# Patient Record
Sex: FEMALE | Race: WHITE | NOT HISPANIC OR LATINO | Employment: FULL TIME | URBAN - METROPOLITAN AREA
[De-identification: names, ages, dates, MRNs, and addresses within clinical notes are randomized per-mention and may not be internally consistent; named-entity substitution may affect disease eponyms.]

---

## 2017-11-01 ENCOUNTER — TRANSFERRED RECORDS (OUTPATIENT)
Dept: MULTI SPECIALTY CLINIC | Facility: CLINIC | Age: 32
End: 2017-11-01

## 2017-11-01 LAB — PAP SMEAR - HIM PATIENT REPORTED: NEGATIVE

## 2018-07-11 ENCOUNTER — TELEPHONE (OUTPATIENT)
Dept: OTHER | Facility: CLINIC | Age: 33
End: 2018-07-11

## 2018-07-11 NOTE — TELEPHONE ENCOUNTER
07/11/18      Call Regarding Onboarding [facility]    Attempt 1    Message no voice mail    Comments:       Outreach   Anne Marie James

## 2018-10-18 ENCOUNTER — ALLIED HEALTH/NURSE VISIT (OUTPATIENT)
Dept: NURSING | Facility: CLINIC | Age: 33
End: 2018-10-18
Payer: COMMERCIAL

## 2018-10-18 DIAGNOSIS — Z23 NEED FOR PROPHYLACTIC VACCINATION AND INOCULATION AGAINST INFLUENZA: Primary | ICD-10-CM

## 2018-10-18 PROCEDURE — 99207 ZZC NO CHARGE NURSE ONLY: CPT

## 2018-10-18 PROCEDURE — 90471 IMMUNIZATION ADMIN: CPT

## 2018-10-18 PROCEDURE — 90686 IIV4 VACC NO PRSV 0.5 ML IM: CPT

## 2018-10-18 NOTE — MR AVS SNAPSHOT
"              After Visit Summary   10/18/2018    Arely Carranza    MRN: [id number]           Patient Information     Date Of Birth          1985        Visit Information        Provider Department      10/18/2018 8:20 AM CARE COORDINATOR [facility]        Today's Diagnoses     Need for prophylactic vaccination and inoculation against influenza    -  1       Follow-ups after your visit        Who to contact     If you have questions or need follow up information about today's clinic visit or your schedule please contact [facility] directly at [phone].  Normal or non-critical lab and imaging results will be communicated to you by [email or web address], letter or phone within 4 business days after the clinic has received the results. If you do not hear from us within 7 days, please contact the clinic through [email or web address] or phone. If you have a critical or abnormal lab result, we will notify you by phone as soon as possible.  Submit refill requests through [employer] or call your pharmacy and they will forward the refill request to us. Please allow 3 business days for your refill to be completed.          Additional Information About Your Visit        MyChart Information     [employer] lets you send messages to your doctor, view your test results, renew your prescriptions, schedule appointments and more. To sign up, go to [email or web address] . Click on \"Log in\" on the left side of the screen, which will take you to the Welcome page. Then click on \"Sign up Now\" on the right side of the page.     You will be asked to enter the access code listed below, as well as some personal information. Please follow the directions to create your username and password.     Your access code is: [email or web address]  Expires: 2019  8:49 AM     Your access code will  in 90 days. If you need help or a new code, please call your [facility] or [phone].        Care " EveryWhere ID     This is your Care EveryWhere ID. This could be used by other organizations to access your [facility] medical records  [id number]         Blood Pressure from Last 3 Encounters:   No data found for BP    Weight from Last 3 Encounters:   No data found for Wt              We Performed the Following     FLU VACCINE, SPLIT VIRUS, IM (QUADRIVALENT) [99664]- >3 YRS     Vaccine Administration, Initial [58074]        Primary Care Provider    None Specified       No primary provider on file.        Equal Access to Services     [facility] : Hadii aad ku hadasho Soomaali, waaxda luqadaha, qaybta kaalmada adeegyada, ly leon . So [facility] [phone].    ATENCIÓN: Si habla español, tiene a hauser disposición servicios gratuitos de asistencia lingüística. Llame al [phone].    We comply with applicable federal civil rights laws and [place] laws. We do not discriminate on the basis of race, color, national origin, age, disability, sex, sexual orientation, or gender identity.            Thank you!     Thank you for choosing [facility]  for your care. Our goal is always to provide you with excellent care. Hearing back from our patients is one way we can continue to improve our services. Please take a few minutes to complete the written survey that you may receive in the mail after your visit with us. Thank you!             Your Updated Medication List - Protect others around you: Learn how to safely use, store and throw away your medicines at www.disposemymeds.org.      Notice  As of 10/18/2018  8:49 AM    You have not been prescribed any medications.

## 2018-10-18 NOTE — PROGRESS NOTES

## 2018-10-18 NOTE — LETTER
October 18, 2018        RE: Arely Carranza        Immunization History   Administered Date(s) Administered     Influenza Vaccine IM 3yrs+ 4 Valent IIV4 10/18/2018

## 2019-09-23 NOTE — PROGRESS NOTES
Albuterol     SUBJECTIVE:   CC: Arely Carranza is an 34 year old woman who presents for preventive health visit.     Healthy Habits:    Do you get at least three servings of calcium containing foods daily (dairy, green leafy vegetables, etc.)? yes    Amount of exercise or daily activities, outside of work: 2 day(s) per week    Problems taking medications regularly No    Medication side effects: No    Have you had an eye exam in the past two years? no    Do you see a dentist twice per year? no    Do you have sleep apnea, excessive snoring or daytime drowsiness?no    Please abstract the following data from this visit with this patient into the appropriate field in Epic:    Tests that can be patient reported without a hard copy:    Pap smear done on this date: 11/2017 (approximately), by this group: Had it done in [facility], results were normal. .     Asthma Follow-Up    Was ACT completed today?  Yes  No flowsheet data found. stable and well-controlled    How many days per week do you miss taking your asthma controller medication?  I do not have an asthma controller medication    Please describe any recent triggers for your asthma: upper respiratory infections and cold air    Have you had any Emergency Room Visits, Urgent Care Visits, or Hospital Admissions since your last office visit?  No          Other Tests found in the patient's chart through Chart Review/Care Everywhere:    Note to Abstraction: If this section is blank, no results were found via Chart Review/Care Everywhere.     -------------------------------------    Today's PHQ-2 Score:   PHQ-2 ( 1999 Pfizer) 9/24/2019   Q1: Little interest or pleasure in doing things 0   Q2: Feeling down, depressed or hopeless 0   PHQ-2 Score 0       Abuse: Current or Past(Physical, Sexual or Emotional)- No  Do you feel safe in your environment? Yes    Social History     Tobacco Use     Smoking status: Never Smoker     Smokeless tobacco: Never Used  "  Substance Use Topics     Alcohol use: Yes     If you drink alcohol do you typically have >3 drinks per day or >7 drinks per week? No                     Reviewed orders with patient.  Reviewed health maintenance and updated orders accordingly - Yes  Lab work is in process    Mammogram not appropriate for this patient based on age.    Pertinent mammograms are reviewed under the imaging tab.  History of abnormal Pap smear: NO - age 30-65 PAP every 5 years with negative HPV co-testing recommended     Reviewed and updated as needed this visit by clinical staff  Tobacco  Allergies  Meds  Med Hx  Surg Hx  Fam Hx  Soc Hx        Reviewed and updated as needed this visit by Provider            ROS:  CONSTITUTIONAL:POSITIVE  for weight gain  INTEGUMENTARY/SKIN: adult acne  EYES: NEGATIVE for vision changes or irritation  ENT: NEGATIVE for ear, mouth and throat problems  RESP: NEGATIVE for significant cough or SOB  BREAST: NEGATIVE for masses, tenderness or discharge  CV: NEGATIVE for chest pain, palpitations or peripheral edema  GI: NEGATIVE for nausea, abdominal pain, heartburn, or change in bowel habits   female: slightly irregular periods, breakthrough spotting- no birth control currently, not sexually active  MUSCULOSKELETAL: NEGATIVE for significant arthralgias or myalgia  NEURO: NEGATIVE for weakness, dizziness or paresthesias  PSYCHIATRIC: NEGATIVE for changes in mood or affect    OBJECTIVE:   /68   Pulse 66   Temp 98.1  F (36.7  C) (Oral)   Ht 1.685 m (5' 6.34\")   Wt 56.7 kg (125 lb 1.6 oz)   SpO2 99%   BMI 19.99 kg/m    EXAM:  GENERAL: healthy, alert and no distress  EYES: Eyes grossly normal to inspection, PERRL and conjunctivae and sclerae normal  HENT: ear canals and TM's normal, nose and mouth without ulcers or lesions  NECK: no adenopathy, no asymmetry, masses, or scars and thyroid normal to palpation  RESP: lungs clear to auscultation - no rales, rhonchi or wheezes  BREAST: normal without " "masses, tenderness or nipple discharge and no palpable axillary masses or adenopathy  CV: regular rate and rhythm, normal S1 S2, no S3 or S4, no murmur, click or rub, no peripheral edema and peripheral pulses strong  ABDOMEN: soft, nontender, no hepatosplenomegaly, no masses and bowel sounds normal   (female): normal female external genitalia, normal urethral meatus, vaginal mucosa, normal cervix/adnexa/uterus without masses or discharge  MS: no gross musculoskeletal defects noted, no edema  SKIN: no suspicious lesions or rashes; mild acne  NEURO: Normal strength and tone, mentation intact and speech normal  PSYCH: mentation appears normal, affect normal/bright    Diagnostic Test Results:  Labs reviewed in Epic  No results found for this or any previous visit (from the past 24 hour(s)).    ASSESSMENT/PLAN:       ICD-10-CM    1. Weight gain R63.5 TSH with free T4 reflex   2. Routine general medical examination at a health care facility Z00.00    3. Screening for cervical cancer Z12.4 Pap imaged thin layer screen with HPV - recommended age 30 - 65 years (select HPV order below)   4. Irregular periods N92.6 TSH with free T4 reflex     CBC with platelets   5. Mild intermittent asthma without complication J45.20 albuterol (PROAIR HFA/PROVENTIL HFA/VENTOLIN HFA) 108 (90 Base) MCG/ACT inhaler     DISCONTINUED: albuterol (PROAIR HFA/PROVENTIL HFA/VENTOLIN HFA) 108 (90 Base) MCG/ACT inhaler       COUNSELING:   Reviewed preventive health counseling, as reflected in patient instructions       Regular exercise       Healthy diet/nutrition    Estimated body mass index is 19.99 kg/m  as calculated from the following:    Height as of this encounter: 1.685 m (5' 6.34\").    Weight as of this encounter: 56.7 kg (125 lb 1.6 oz).         reports that she has never smoked. She has never used smokeless tobacco.      Counseling Resources:  ATP IV Guidelines  Pooled Cohorts Equation Calculator  Breast Cancer Risk Calculator  FRAX Risk " Assessment  ICSI Preventive Guidelines  Dietary Guidelines for Americans, 2010  USDA's MyPlate  ASA Prophylaxis  Lung CA Screening    HENRY Becerril CNP  [facility]

## 2019-09-24 ENCOUNTER — OFFICE VISIT (OUTPATIENT)
Dept: FAMILY MEDICINE | Facility: CLINIC | Age: 34
End: 2019-09-24
Payer: COMMERCIAL

## 2019-09-24 VITALS
SYSTOLIC BLOOD PRESSURE: 116 MMHG | DIASTOLIC BLOOD PRESSURE: 68 MMHG | BODY MASS INDEX: 20.1 KG/M2 | WEIGHT: 125.1 LBS | HEART RATE: 66 BPM | HEIGHT: 66 IN | TEMPERATURE: 98.1 F | OXYGEN SATURATION: 99 %

## 2019-09-24 DIAGNOSIS — Z12.4 SCREENING FOR CERVICAL CANCER: ICD-10-CM

## 2019-09-24 DIAGNOSIS — Z00.00 ROUTINE GENERAL MEDICAL EXAMINATION AT A HEALTH CARE FACILITY: ICD-10-CM

## 2019-09-24 DIAGNOSIS — N92.6 IRREGULAR PERIODS: ICD-10-CM

## 2019-09-24 DIAGNOSIS — J45.20 MILD INTERMITTENT ASTHMA WITHOUT COMPLICATION: ICD-10-CM

## 2019-09-24 DIAGNOSIS — J45.20 MILD INTERMITTENT ASTHMA, UNSPECIFIED WHETHER COMPLICATED: ICD-10-CM

## 2019-09-24 DIAGNOSIS — R63.5 WEIGHT GAIN: Primary | ICD-10-CM

## 2019-09-24 LAB
ERYTHROCYTE [DISTWIDTH] IN BLOOD BY AUTOMATED COUNT: 13.1 % (ref 10–15)
HCT VFR BLD AUTO: 40.7 % (ref 35–47)
HGB BLD-MCNC: 13.6 G/DL (ref 11.7–15.7)
MCH RBC QN AUTO: 29.8 PG (ref 26.5–33)
MCHC RBC AUTO-ENTMCNC: 33.4 G/DL (ref 31.5–36.5)
MCV RBC AUTO: 89 FL (ref 78–100)
PLATELET # BLD AUTO: 163 10E9/L (ref 150–450)
RBC # BLD AUTO: 4.56 10E12/L (ref 3.8–5.2)
TSH SERPL DL<=0.005 MIU/L-ACNC: 2.21 MU/L (ref 0.4–4)
WBC # BLD AUTO: 5.8 10E9/L (ref 4–11)

## 2019-09-24 PROCEDURE — G0145 SCR C/V CYTO,THINLAYER,RESCR: HCPCS | Performed by: NURSE PRACTITIONER

## 2019-09-24 PROCEDURE — 87624 HPV HI-RISK TYP POOLED RSLT: CPT | Performed by: NURSE PRACTITIONER

## 2019-09-24 PROCEDURE — 90686 IIV4 VACC NO PRSV 0.5 ML IM: CPT | Performed by: NURSE PRACTITIONER

## 2019-09-24 PROCEDURE — 84443 ASSAY THYROID STIM HORMONE: CPT | Performed by: NURSE PRACTITIONER

## 2019-09-24 PROCEDURE — 36415 COLL VENOUS BLD VENIPUNCTURE: CPT | Performed by: NURSE PRACTITIONER

## 2019-09-24 PROCEDURE — 90471 IMMUNIZATION ADMIN: CPT | Performed by: NURSE PRACTITIONER

## 2019-09-24 PROCEDURE — 99385 PREV VISIT NEW AGE 18-39: CPT | Mod: 25 | Performed by: NURSE PRACTITIONER

## 2019-09-24 PROCEDURE — 85027 COMPLETE CBC AUTOMATED: CPT | Performed by: NURSE PRACTITIONER

## 2019-09-24 RX ORDER — ALBUTEROL SULFATE 90 UG/1
2 AEROSOL, METERED RESPIRATORY (INHALATION) EVERY 6 HOURS
Qty: 1 INHALER | Refills: 3 | Status: SHIPPED | OUTPATIENT
Start: 2019-09-24 | End: 2019-09-24

## 2019-09-24 RX ORDER — ALBUTEROL SULFATE 90 UG/1
AEROSOL, METERED RESPIRATORY (INHALATION)
Qty: 1 INHALER | Refills: 3 | Status: SHIPPED | OUTPATIENT
Start: 2019-09-24 | End: 2019-10-31

## 2019-09-24 ASSESSMENT — MIFFLIN-ST. JEOR: SCORE: 1294.58

## 2019-09-24 NOTE — LETTER
September 26, 2019      Arely Carranza  [address]  [place] [address]        Dear  Dillon,    We are writing to inform you of your test results.    Your test results fall within the expected range(s) or remain unchanged from previous results.  Please continue with current treatment plan.    Resulted Orders   TSH with free T4 reflex   Result Value Ref Range    TSH 2.21 0.40 - 4.00 mU/L   CBC with platelets   Result Value Ref Range    WBC 5.8 4.0 - 11.0 10e9/L    RBC Count 4.56 3.8 - 5.2 10e12/L    Hemoglobin 13.6 11.7 - 15.7 g/dL    Hematocrit 40.7 35.0 - 47.0 %    MCV 89 78 - 100 fl    MCH 29.8 26.5 - 33.0 pg    MCHC 33.4 31.5 - 36.5 g/dL    RDW 13.1 10.0 - 15.0 %    Platelet Count 163 150 - 450 10e9/L       If you have any questions or concerns, please call the clinic at the number listed above.       Sincerely,        HENRY Becerril CNP

## 2019-09-24 NOTE — LETTER
October 3, 2019    Arely Carranza  [address]  [place] [address]    Dear  Dillon,  This letter is regarding your recent Pap smear (cervical cancer screening) and Human Papillomavirus (HPV) test.  We are happy to inform you that your Pap smear result is normal. Cervical cancer is closely linked with certain types of HPV. Your results showed no evidence of high-risk HPV.  We recommend you have your next PAP smear and HPV test in 5 years.  You will still need to return to the clinic every year for an annual exam and other preventive tests.  If you have additional questions regarding this result, please call our registered nurse, Angella at [phone].  Sincerely,    HENRY Becerril CNP //alen

## 2019-09-26 LAB
COPATH REPORT: NORMAL
PAP: NORMAL

## 2019-09-28 ENCOUNTER — OFFICE VISIT (OUTPATIENT)
Dept: URGENT CARE | Facility: URGENT CARE | Age: 34
End: 2019-09-28
Payer: COMMERCIAL

## 2019-09-28 VITALS
HEART RATE: 95 BPM | WEIGHT: 123.4 LBS | TEMPERATURE: 100.5 F | BODY MASS INDEX: 19.71 KG/M2 | DIASTOLIC BLOOD PRESSURE: 74 MMHG | SYSTOLIC BLOOD PRESSURE: 119 MMHG | OXYGEN SATURATION: 100 %

## 2019-09-28 DIAGNOSIS — J02.0 ACUTE STREPTOCOCCAL PHARYNGITIS: ICD-10-CM

## 2019-09-28 DIAGNOSIS — R07.0 THROAT PAIN: Primary | ICD-10-CM

## 2019-09-28 LAB
DEPRECATED S PYO AG THROAT QL EIA: ABNORMAL
SPECIMEN SOURCE: ABNORMAL

## 2019-09-28 PROCEDURE — 99213 OFFICE O/P EST LOW 20 MIN: CPT | Performed by: NURSE PRACTITIONER

## 2019-09-28 PROCEDURE — 87880 STREP A ASSAY W/OPTIC: CPT | Performed by: NURSE PRACTITIONER

## 2019-09-28 RX ORDER — LORATADINE 10 MG/1
10 TABLET ORAL DAILY
COMMUNITY
End: 2019-10-31

## 2019-09-28 RX ORDER — AZITHROMYCIN 250 MG/1
TABLET, FILM COATED ORAL
Qty: 6 TABLET | Refills: 0 | Status: SHIPPED | OUTPATIENT
Start: 2019-09-28 | End: 2019-10-31

## 2019-09-28 RX ORDER — OMEGA-3 FATTY ACIDS/FISH OIL 300-1000MG
200 CAPSULE ORAL EVERY 4 HOURS PRN
COMMUNITY
End: 2019-10-31

## 2019-09-28 ASSESSMENT — ENCOUNTER SYMPTOMS
CHILLS: 1
HEADACHES: 1
SORE THROAT: 1
COUGH: 1
SHORTNESS OF BREATH: 0
RHINORRHEA: 0
VOMITING: 1
NAUSEA: 0
FEVER: 1
DIARRHEA: 0

## 2019-09-28 NOTE — PATIENT INSTRUCTIONS
Patient Education     Pharyngitis: Strep (Confirmed)    You have had a positive test for strep throat. Strep throat is a contagious illness. It is spread by coughing, kissing or by touching others after touching your mouth or nose. Symptoms include throat pain that is worse with swallowing, aching all over, headache, and fever. It is treated with antibiotic medicine. This should help you start to feel better in 1 to 2 days.  Home care    Rest at home. Drink plenty of fluids to you won't get dehydrated.    No work or school for the first 2 days of taking the antibiotics. After this time, you will not be contagious. You can then return to school or work if you are feeling better.     Take antibiotic medicine for the full 10 days, even if you feel better. This is very important to ensure the infection is treated. It is also important to prevent medicine-resistant germs from developing. If you were given an antibiotic shot, you don't need any more antibiotics.    You may use acetaminophen or ibuprofen to control pain or fever, unless another medicine was prescribed for this. Talk with your healthcare provider before taking these medicines if you have chronic liver or kidney disease. Also talk with your healthcare provider if you have had a stomach ulcer or GI bleeding.    Throat lozenges or sprays help reduce pain. Gargling with warm saltwater will also reduce throat pain. Dissolve 1/2 teaspoon of salt in 1 glass of warm water. This may be useful just before meals.     Soft foods are OK. Don't eat salty or spicy foods.  Follow-up care  Follow up with your healthcare provider or our staff if you don't get better over the next week.  When to seek medical advice  Call your healthcare provider right away if any of these occur:    Fever of 100.4 F (38 C) or higher, or as directed by your healthcare provider    New or worsening ear pain, sinus pain, or headache    Painful lumps in the back of neck    Stiff neck    Lymph  nodes getting larger or becoming soft in the middle    You can't swallow liquids or you can't open your mouth wide because of throat pain    Signs of dehydration. These include very dark urine or no urine, sunken eyes, and dizziness.    Trouble breathing or noisy breathing    Muffled voice    Rash  Prevention  Here are steps you can take to help prevent an infection:    Keep good hand washing habits.    Don t have close contact with people who have sore throats, colds, or other upper respiratory infections.    Don t smoke, and stay away from secondhand smoke.  Date Last Reviewed: 11/1/2017 2000-2018 [employer]. [address] [place] [address], [place] [address]. All rights reserved. This information is not intended as a substitute for professional medical care. Always follow your healthcare professional's instructions.

## 2019-09-28 NOTE — PROGRESS NOTES
SUBJECTIVE:   Arely Carranza is a 34 year old female presenting with a chief complaint of   Chief Complaint   Patient presents with     Pharyngitis     Started w/ the vomiting- worse today     Fever     Started yesterday     Vomiting     Threw up Thursday night      Chills     Started yesterday     Headache     Imm/Inj     Had flu shot on Tuesday       She is an established patient of [facility].    URI Adult    Onset of symptoms was 2 day(s) ago.  Course of illness is worsening.    Severity moderate  Current and Associated symptoms: fever, chills, sore throat, headache and vomiting  Treatment measures tried include Tylenol/Ibuprofen.  Predisposing factors include None.      Review of Systems   Constitutional: Positive for chills and fever.   HENT: Positive for sore throat. Negative for congestion, ear pain and rhinorrhea.    Respiratory: Positive for cough. Negative for shortness of breath.    Gastrointestinal: Positive for vomiting. Negative for diarrhea and nausea.   Neurological: Positive for headaches.   All other systems reviewed and are negative.      History reviewed. No pertinent past medical history.  History reviewed. No pertinent family history.  Current Outpatient Medications   Medication Sig Dispense Refill     albuterol (PROAIR HFA/PROVENTIL HFA/VENTOLIN HFA) 108 (90 Base) MCG/ACT inhaler 1-2 puff every 6 hours as needed 1 Inhaler 3     azithromycin (ZITHROMAX) 250 MG tablet Two tablets first day, then one tablet daily for four days. 6 tablet 0     ibuprofen (ADVIL/MOTRIN) 200 MG capsule Take 200 mg by mouth every 4 hours as needed for fever       loratadine (CLARITIN) 10 MG tablet Take 10 mg by mouth daily       Social History     Tobacco Use     Smoking status: Never Smoker     Smokeless tobacco: Never Used   Substance Use Topics     Alcohol use: Yes       OBJECTIVE  /74   Pulse 95   Temp 100.5  F (38.1  C) (Oral)   Wt 56 kg (123 lb 6.4 oz)   SpO2 100%   BMI 19.71 kg/m      Physical  Exam  Vitals signs and nursing note reviewed.   Constitutional:       General: She is not in acute distress.     Appearance: She is not diaphoretic.   HENT:      Head: Normocephalic and atraumatic.      Right Ear: Tympanic membrane and external ear normal.      Left Ear: Tympanic membrane and external ear normal.      Mouth/Throat:      Pharynx: Posterior oropharyngeal erythema present.      Tonsils: Swellin+ on the right. 1+ on the left.   Eyes:      Pupils: Pupils are equal, round, and reactive to light.   Neck:      Musculoskeletal: Normal range of motion and neck supple.   Pulmonary:      Effort: Pulmonary effort is normal. No respiratory distress.      Breath sounds: Normal breath sounds.   Lymphadenopathy:      Cervical: Cervical adenopathy (anterior) present.   Skin:     General: Skin is warm and dry.   Neurological:      Mental Status: She is alert.      Cranial Nerves: No cranial nerve deficit.         Labs:  Results for orders placed or performed in visit on 19 (from the past 24 hour(s))   Strep, Rapid Screen   Result Value Ref Range    Specimen Description Throat     Rapid Strep A Screen (A)      POSITIVE: Group A Streptococcal antigen detected by immunoassay.       ASSESSMENT:      ICD-10-CM    1. Throat pain R07.0 Strep, Rapid Screen   2. Acute streptococcal pharyngitis J02.0 azithromycin (ZITHROMAX) 250 MG tablet        PLAN:  I have discussed lab results during the visit. Salty water gargles advised twice a day.  Antibiotics as ordered. Side effects of antibiotics discussed.  Change toothbrush in 24 hours of being on antibiotics to avoid reinfection.   Patient educational/instructional material provided including reasons for follow-up    The patient indicates understanding of these issues and agrees with the plan.          Patient Instructions     Patient Education     Pharyngitis: Strep (Confirmed)    You have had a positive test for strep throat. Strep throat is a contagious illness. It is  spread by coughing, kissing or by touching others after touching your mouth or nose. Symptoms include throat pain that is worse with swallowing, aching all over, headache, and fever. It is treated with antibiotic medicine. This should help you start to feel better in 1 to 2 days.  Home care    Rest at home. Drink plenty of fluids to you won't get dehydrated.    No work or school for the first 2 days of taking the antibiotics. After this time, you will not be contagious. You can then return to school or work if you are feeling better.     Take antibiotic medicine for the full 10 days, even if you feel better. This is very important to ensure the infection is treated. It is also important to prevent medicine-resistant germs from developing. If you were given an antibiotic shot, you don't need any more antibiotics.    You may use acetaminophen or ibuprofen to control pain or fever, unless another medicine was prescribed for this. Talk with your healthcare provider before taking these medicines if you have chronic liver or kidney disease. Also talk with your healthcare provider if you have had a stomach ulcer or GI bleeding.    Throat lozenges or sprays help reduce pain. Gargling with warm saltwater will also reduce throat pain. Dissolve 1/2 teaspoon of salt in 1 glass of warm water. This may be useful just before meals.     Soft foods are OK. Don't eat salty or spicy foods.  Follow-up care  Follow up with your healthcare provider or our staff if you don't get better over the next week.  When to seek medical advice  Call your healthcare provider right away if any of these occur:    Fever of 100.4 F (38 C) or higher, or as directed by your healthcare provider    New or worsening ear pain, sinus pain, or headache    Painful lumps in the back of neck    Stiff neck    Lymph nodes getting larger or becoming soft in the middle    You can't swallow liquids or you can't open your mouth wide because of throat pain    Signs of  dehydration. These include very dark urine or no urine, sunken eyes, and dizziness.    Trouble breathing or noisy breathing    Muffled voice    Rash  Prevention  Here are steps you can take to help prevent an infection:    Keep good hand washing habits.    Don t have close contact with people who have sore throats, colds, or other upper respiratory infections.    Don t smoke, and stay away from secondhand smoke.  Date Last Reviewed: 11/1/2017 2000-2018 [employer]. [address] [place] [address]. All rights reserved. This information is not intended as a substitute for professional medical care. Always follow your healthcare professional's instructions.

## 2019-09-30 LAB
FINAL DIAGNOSIS: NORMAL
HPV HR 12 DNA CVX QL NAA+PROBE: NEGATIVE
HPV16 DNA SPEC QL NAA+PROBE: NEGATIVE
HPV18 DNA SPEC QL NAA+PROBE: NEGATIVE
SPECIMEN DESCRIPTION: NORMAL
SPECIMEN SOURCE CVX/VAG CYTO: NORMAL

## 2019-10-31 ENCOUNTER — TRANSCRIBE ORDERS (OUTPATIENT)
Dept: MATERNAL FETAL MEDICINE | Facility: CLINIC | Age: 34
End: 2019-10-31

## 2019-10-31 ENCOUNTER — PRENATAL OFFICE VISIT (OUTPATIENT)
Dept: NURSING | Facility: CLINIC | Age: 34
End: 2019-10-31
Payer: COMMERCIAL

## 2019-10-31 VITALS
WEIGHT: 120.7 LBS | SYSTOLIC BLOOD PRESSURE: 123 MMHG | HEIGHT: 67 IN | HEART RATE: 105 BPM | BODY MASS INDEX: 18.94 KG/M2 | DIASTOLIC BLOOD PRESSURE: 77 MMHG | TEMPERATURE: 98 F

## 2019-10-31 DIAGNOSIS — Z34.81 ENCOUNTER FOR SUPERVISION OF OTHER NORMAL PREGNANCY IN FIRST TRIMESTER: Primary | ICD-10-CM

## 2019-10-31 DIAGNOSIS — O26.90 PREGNANCY RELATED CONDITION, ANTEPARTUM: Primary | ICD-10-CM

## 2019-10-31 DIAGNOSIS — Z23 NEED FOR TDAP VACCINATION: ICD-10-CM

## 2019-10-31 LAB
ABO + RH BLD: NORMAL
ABO + RH BLD: NORMAL
ALBUMIN UR-MCNC: NEGATIVE MG/DL
APPEARANCE UR: CLEAR
BILIRUB UR QL STRIP: NEGATIVE
BLD GP AB SCN SERPL QL: NORMAL
BLOOD BANK CMNT PATIENT-IMP: NORMAL
COLOR UR AUTO: YELLOW
GLUCOSE UR STRIP-MCNC: NEGATIVE MG/DL
HCG UR QL: POSITIVE
HGB UR QL STRIP: NEGATIVE
KETONES UR STRIP-MCNC: NEGATIVE MG/DL
LEUKOCYTE ESTERASE UR QL STRIP: NEGATIVE
NITRATE UR QL: NEGATIVE
PH UR STRIP: 6.5 PH (ref 5–7)
SOURCE: NORMAL
SP GR UR STRIP: <=1.005 (ref 1–1.03)
SPECIMEN EXP DATE BLD: NORMAL
UROBILINOGEN UR STRIP-ACNC: 0.2 EU/DL (ref 0.2–1)

## 2019-10-31 PROCEDURE — 86780 TREPONEMA PALLIDUM: CPT | Performed by: OBSTETRICS & GYNECOLOGY

## 2019-10-31 PROCEDURE — 87389 HIV-1 AG W/HIV-1&-2 AB AG IA: CPT | Performed by: OBSTETRICS & GYNECOLOGY

## 2019-10-31 PROCEDURE — 87340 HEPATITIS B SURFACE AG IA: CPT | Performed by: OBSTETRICS & GYNECOLOGY

## 2019-10-31 PROCEDURE — 36415 COLL VENOUS BLD VENIPUNCTURE: CPT | Performed by: OBSTETRICS & GYNECOLOGY

## 2019-10-31 PROCEDURE — 99207 ZZC NO CHARGE NURSE ONLY: CPT

## 2019-10-31 PROCEDURE — 87086 URINE CULTURE/COLONY COUNT: CPT | Performed by: OBSTETRICS & GYNECOLOGY

## 2019-10-31 PROCEDURE — 86850 RBC ANTIBODY SCREEN: CPT | Performed by: OBSTETRICS & GYNECOLOGY

## 2019-10-31 PROCEDURE — 86762 RUBELLA ANTIBODY: CPT | Performed by: OBSTETRICS & GYNECOLOGY

## 2019-10-31 PROCEDURE — 86900 BLOOD TYPING SEROLOGIC ABO: CPT | Performed by: OBSTETRICS & GYNECOLOGY

## 2019-10-31 PROCEDURE — 81003 URINALYSIS AUTO W/O SCOPE: CPT | Performed by: OBSTETRICS & GYNECOLOGY

## 2019-10-31 PROCEDURE — 86901 BLOOD TYPING SEROLOGIC RH(D): CPT | Performed by: OBSTETRICS & GYNECOLOGY

## 2019-10-31 PROCEDURE — 81025 URINE PREGNANCY TEST: CPT | Performed by: ADVANCED PRACTICE MIDWIFE

## 2019-10-31 SDOH — SOCIAL STABILITY: SOCIAL INSECURITY
WITHIN THE LAST YEAR, HAVE YOU BEEN KICKED, HIT, SLAPPED, OR OTHERWISE PHYSICALLY HURT BY YOUR PARTNER OR EX-PARTNER?: NO

## 2019-10-31 SDOH — HEALTH STABILITY: PHYSICAL HEALTH: ON AVERAGE, HOW MANY DAYS PER WEEK DO YOU ENGAGE IN MODERATE TO STRENUOUS EXERCISE (LIKE A BRISK WALK)?: 4 DAYS

## 2019-10-31 SDOH — HEALTH STABILITY: MENTAL HEALTH
DO YOU FEEL STRESS - TENSE, RESTLESS, NERVOUS, OR ANXIOUS, OR UNABLE TO SLEEP AT NIGHT BECAUSE YOUR MIND IS TROUBLED ALL THE TIME - THESE DAYS?: NOT AT ALL

## 2019-10-31 SDOH — SOCIAL STABILITY: SOCIAL INSECURITY: WITHIN THE LAST YEAR, HAVE YOU BEEN AFRAID OF YOUR PARTNER OR EX-PARTNER?: NO

## 2019-10-31 SDOH — HEALTH STABILITY: PHYSICAL HEALTH: ON AVERAGE, HOW MANY MINUTES DO YOU ENGAGE IN EXERCISE AT THIS LEVEL?: 60 MIN

## 2019-10-31 SDOH — SOCIAL STABILITY: SOCIAL INSECURITY: WITHIN THE LAST YEAR, HAVE YOU BEEN HUMILIATED OR EMOTIONALLY ABUSED IN OTHER WAYS BY YOUR PARTNER OR EX-PARTNER?: NO

## 2019-10-31 SDOH — SOCIAL STABILITY: SOCIAL INSECURITY
WITHIN THE LAST YEAR, HAVE YOU BEEN RAPED OR FORCED TO HAVE ANY KIND OF SEXUAL ACTIVITY BY YOUR PARTNER OR EX-PARTNER?: NO

## 2019-10-31 SDOH — SOCIAL STABILITY: SOCIAL NETWORK: ARE YOU MARRIED, WIDOWED, DIVORCED, SEPARATED, NEVER MARRIED, OR LIVING WITH A PARTNER?: MARRIED

## 2019-10-31 SDOH — SOCIAL STABILITY: SOCIAL INSECURITY: ARE YOU MARRIED, WIDOWED, DIVORCED, SEPARATED, NEVER MARRIED, OR LIVING WITH A PARTNER?: MARRIED

## 2019-10-31 SDOH — SOCIAL STABILITY: SOCIAL INSECURITY
WITHIN THE LAST YEAR, HAVE TO BEEN RAPED OR FORCED TO HAVE ANY KIND OF SEXUAL ACTIVITY BY YOUR PARTNER OR EX-PARTNER?: NO

## 2019-10-31 SDOH — HEALTH STABILITY: MENTAL HEALTH
STRESS IS WHEN SOMEONE FEELS TENSE, NERVOUS, ANXIOUS, OR CAN'T SLEEP AT NIGHT BECAUSE THEIR MIND IS TROUBLED. HOW STRESSED ARE YOU?: NOT AT ALL

## 2019-10-31 ASSESSMENT — MIFFLIN-ST. JEOR: SCORE: 1277.18

## 2019-10-31 NOTE — PROGRESS NOTES
Important Information for Provider:     Patient presents for new ob teaching and labs, second pregnancy. Ordered first trimester screening/nipt. Handouts reviewed and given. Ultrasound scheduled for 11/14/19 with Dr Shaffer to review after. Patient has short menstrual cycles. Has NOB with Dr Keys 11/25/19. Patient is a Anaesthesiologist.           Prenatal OB Questionnaire  Patient supplied answers from flow sheet for:  Prenatal OB Questionnaire.  Past Medical History  Diabetes?: No  Hypertension : No  Heart disease, mitral valve prolapse or rheumatic fever?: No  An autoimmune disease such as lupus or rheumatoid arthritis?: No  Kidney disease or urinary tract infection?: UTI 2006  Epilepsy, seizures or spells?: No  Migraine headaches?: No  A stroke or loss of function or sensation?: No  Any other neurological problems?: No  Have you ever been treated for depression?: No  Are you having problems with crying spells or loss of self-esteem?: No  Have you ever required psychiatric care?: No  Have you ever had hepatitis, liver disease or jaundice?: No  Have you been treated for blood clots in your veins, deep vein thrombosis, inflammation in the veins, thrombosis, phlebitis, pulmonary embolism or varicosities?: varicose veins, wears compression stocking  Have you had excessive bleeding after surgery or dental work?: No  Do you bleed more than other women after a cut or scratch?: No  Do you have a history of anemia?: No  Have you ever had thyroid problems or taken thyroid medication?: No   Do you have any endocrine problems?: No  Have you ever been in a major accident or suffered serious trauma?: No  Within the last year, has anyone hit, slapped, kicked or otherwise hurt you?: No  In the last year, has anyone forced you to have sex when you didn't want to?: No    Past Medical History 2   Have you ever received a blood transfusion?: No  Would you refuse a blood transfusion if a doctor judged it to be medically  necessary?: No   If you answered Yes, would you rather die than receive a blood transfusion?: No  If you answered Yes, is this for Methodist reasons?: No  Does anyone in your home smoke?: No  Do you use tobacco products?: No  Do you drink beer, wine or hard liquor?: No  Do you use any of the following: marijuana, speed, cocaine, heroin, hallucinogens or other drugs?: No   Is your blood type Rh negative?: Yes  Have you ever had abnormal antibodies in your blood?: No  Have you ever had asthma?: Yes  Have you ever had tuberculosis?: No  Do you have any allergies to drugs or over-the-counter medications?: (!) Yes(PCN, Sulfa)  Allergies: Dust Mites, Aspartame, Ethanol, Venlafaxine, Hydrochloride, Sertraline: Dust  Have you had any breast problems?: No  Have you ever ?: No  Have you had any gynecological surgical procedures such as cervical conization, a LEEP procedure, laser treatment, cryosurgery of the cervix or a dilation and curettage, etc?: No  Have you ever had any other surgical procedures?: 2004 foot surgery  Have you been hospitalized for a nonsurgical reason excluding normal delivery?: No  Have you ever had any anesthetic complications?: No  Have you ever had an abnormal pap smear?: No    Past Medical History (Continued)  Do you have a history of abnormalities of the uterus?: No  Did your mother take KAYLEY or any other hormones when she was pregnant with you?: No  Did it take you more than a year to become pregnant?: No  Have you ever been evaluated or treated for infertility?: No  Is there a history of medical problems in your family, which you feel may be important to this pregnancy?: No  Do you have any other problems we have not asked about which you feel may be important to this pregnancy?: No    Symptoms since last menstrual period  Do you have any of the following symptoms: abdominal pain, blood in stools or urine, chest pain, shortness of breath, coughing or vomiting up blood, your heart racing  or skipping beats, nausea and vomiting, pain on urination or vaginal discharge or bleed: (!) Yes  Will the patient be 35 years old or older at the time of delivery?: No    Has the patient, baby's father or anyone in either family had:  Thalassemia (Italian, Greek, Mediterranean or  background only) and an MCV result less than 80?: No  Neural tube defect such as meningomyelocele, spina bifida or anencephaly?: No  Congenital heart defect?: FOB's paternal aunt VSD  Down's Syndrome?: No  Mariano-Sachs disease (Jain, Cajun, Kyrgyz-Egyptian)?: No  Sickle cell disease or trait ()?: No  Hemophilia or other inherited problems of blood?: No  Muscular dystrophy?: No  Cystic fibrosis?: No  [place]'s chorea?: No  Mental retardation/autism?: No  If yes, was the person tested for fragile X?: No  Any other inherited genetic or chromosomal disorder?: No  Maternal metabolic disorder (e.g Insulin-dependent diabetes, PKU)?: No  A child with birth defects not listed above?: No  Recurrent pregnancy loss or stillbirth?: No   Has the patient had any medications/street drugs/alcohol since her last menstrual period?: No  Does the patient or baby's father have any other genetic risks?: No    Infection History   Do you object to being tested for Hepatitis B?: No  Do you object to being tested for HIV?: No   Do you feel that you are at high risk for coming in contact with the AIDS virus?: No  Have you ever been treated for tuberculosis?: No  Have you ever had a positive skin test for tuberculosis?: No  Do you live with someone who has tuberculosis?: No  Have you ever been exposed to tuberculosis?: No  Do you have genital herpes?: No  Does your partner have genital herpes?: No  Have you had a viral illness since your last period?: No  Have you ever had gonorrhea, chlamydia, syphilis, venereal warts, trichomoniasis, pelvic inflammatory disease or any other sexually transmitted disease?: No  Do you know if you are a genital group B  streptococcus carrier?: No  Have you had chicken pox/varicella?: (!) Yes   Have you been vaccinated against chicken Pox?: No  Have you had any other infectious diseases?: No      Allergies as of 10/31/2019:    Allergies as of 10/31/2019 - Reviewed 10/31/2019   Allergen Reaction Noted     Penicillins  09/24/2019     Sulfa drugs  09/24/2019       Current medications are:  No current outpatient medications on file.         Early ultrasound screening tool:    Does patient have irregular periods?  No  Did patient use hormonal birth control in the three months prior to positive urine pregnancy test? No  Is the patient breastfeeding?  No  Is the patient 10 weeks or greater at time of education visit?  No

## 2019-11-01 LAB
BACTERIA SPEC CULT: NORMAL
HBV SURFACE AG SERPL QL IA: NONREACTIVE
HIV 1+2 AB+HIV1 P24 AG SERPL QL IA: NONREACTIVE
RUBV IGG SERPL IA-ACNC: 25 IU/ML
SPECIMEN SOURCE: NORMAL
T PALLIDUM AB SER QL: NONREACTIVE

## 2019-11-14 ENCOUNTER — ANCILLARY PROCEDURE (OUTPATIENT)
Dept: ULTRASOUND IMAGING | Facility: CLINIC | Age: 34
End: 2019-11-14
Attending: OBSTETRICS & GYNECOLOGY
Payer: COMMERCIAL

## 2019-11-14 ENCOUNTER — OFFICE VISIT (OUTPATIENT)
Dept: OBGYN | Facility: CLINIC | Age: 34
End: 2019-11-14
Payer: COMMERCIAL

## 2019-11-14 VITALS
WEIGHT: 123 LBS | DIASTOLIC BLOOD PRESSURE: 66 MMHG | SYSTOLIC BLOOD PRESSURE: 105 MMHG | BODY MASS INDEX: 19.56 KG/M2 | TEMPERATURE: 98.3 F | HEART RATE: 57 BPM

## 2019-11-14 DIAGNOSIS — Z34.90 SUPERVISION OF NORMAL PREGNANCY: ICD-10-CM

## 2019-11-14 DIAGNOSIS — Z34.90 EARLY STAGE OF PREGNANCY: Primary | ICD-10-CM

## 2019-11-14 PROCEDURE — 76817 TRANSVAGINAL US OBSTETRIC: CPT | Performed by: OBSTETRICS & GYNECOLOGY

## 2019-11-14 PROCEDURE — 99202 OFFICE O/P NEW SF 15 MIN: CPT | Performed by: OBSTETRICS & GYNECOLOGY

## 2019-11-14 NOTE — PROGRESS NOTES
GYN clinic visit  2019    CC: ultrasound follow up    HPI:   Arely Carranza is a 34 year old  who presents to clinic today to discuss ultrasound results.     Ultrasound today for viability.  Has regular, but short cycles.  Some nausea, made worse with walking and drinking water.  Coping ok at this point and declines prescription meds    Reviewed GYN hx, OB hx, past medical hx, surgical hx and family hx.   Reviewed medications and allergies. Changes made in EPIC.     OBJECTIVE:  Vitals:    19 0920   BP: 105/66   BP Location: Left arm   Patient Position: Sitting   Cuff Size: Adult Regular   Pulse: 57   Temp: 98.3  F (36.8  C)   TempSrc: Oral   Weight: 55.8 kg (123 lb)     Body mass index is 19.56 kg/m .    Gen: alert, oriented, no distress, cooperative and pleasant  Psych:  Appropriate mood and affect  Neuro:  Gait WNL.  Sensation and strength grossly intact      ASSESSMENT:   Arely Carranza is a 34 year old  here to discuss ultrasound results.     PLAN:  Single live IUP.  Dating not consistent with LMP dating, so will change GHASSAN to 2020.  Note made in sticky note for first OB visit, will need to update episode at that time.    Has FTS and NOB appointment scheduled.    Discussed B6 and unisom as needed for nausea.    Venita Shaffer MD

## 2019-11-14 NOTE — NURSING NOTE
"Chief Complaint   Patient presents with     Follow Up     Ultrasound       Initial /66 (BP Location: Left arm, Patient Position: Sitting, Cuff Size: Adult Regular)   Pulse 57   Temp 98.3  F (36.8  C) (Oral)   Wt 55.8 kg (123 lb)   LMP 2019   BMI 19.56 kg/m   Estimated body mass index is 19.56 kg/m  as calculated from the following:    Height as of 10/31/19: 1.689 m (5' 6.5\").    Weight as of this encounter: 55.8 kg (123 lb).  BP completed using cuff size: regular    Questioned patient about current smoking habits.  Pt. has never smoked.          The following HM Due: NONE      The following patient reported/Care Every where data was sent to:  P ABSTRACT QUALITY INITIATIVES [[id number]]  DONALD Castaneda MA           "

## 2019-11-25 ENCOUNTER — OFFICE VISIT (OUTPATIENT)
Dept: OPTOMETRY | Facility: CLINIC | Age: 34
End: 2019-11-25
Payer: COMMERCIAL

## 2019-11-25 ENCOUNTER — PRENATAL OFFICE VISIT (OUTPATIENT)
Dept: OBGYN | Facility: CLINIC | Age: 34
End: 2019-11-25
Payer: COMMERCIAL

## 2019-11-25 VITALS
BODY MASS INDEX: 19.4 KG/M2 | SYSTOLIC BLOOD PRESSURE: 105 MMHG | HEART RATE: 70 BPM | WEIGHT: 122 LBS | DIASTOLIC BLOOD PRESSURE: 66 MMHG

## 2019-11-25 DIAGNOSIS — H52.223 REGULAR ASTIGMATISM OF BOTH EYES: ICD-10-CM

## 2019-11-25 DIAGNOSIS — J45.909 UNCOMPLICATED ASTHMA, UNSPECIFIED ASTHMA SEVERITY, UNSPECIFIED WHETHER PERSISTENT: ICD-10-CM

## 2019-11-25 DIAGNOSIS — Z01.00 ENCOUNTER FOR EXAMINATION OF EYES AND VISION WITHOUT ABNORMAL FINDINGS: Primary | ICD-10-CM

## 2019-11-25 DIAGNOSIS — Z87.59 HISTORY OF SHOULDER DYSTOCIA IN PRIOR PREGNANCY: ICD-10-CM

## 2019-11-25 DIAGNOSIS — Z11.3 SCREEN FOR STD (SEXUALLY TRANSMITTED DISEASE): ICD-10-CM

## 2019-11-25 DIAGNOSIS — Z87.59 HISTORY OF VACUUM EXTRACTION ASSISTED DELIVERY: ICD-10-CM

## 2019-11-25 DIAGNOSIS — Z34.91 PRENATAL CARE IN FIRST TRIMESTER: Primary | ICD-10-CM

## 2019-11-25 DIAGNOSIS — O26.899 RH NEGATIVE STATE IN ANTEPARTUM PERIOD: ICD-10-CM

## 2019-11-25 DIAGNOSIS — H52.13 MYOPIA OF BOTH EYES: ICD-10-CM

## 2019-11-25 DIAGNOSIS — Z67.91 RH NEGATIVE STATE IN ANTEPARTUM PERIOD: ICD-10-CM

## 2019-11-25 PROCEDURE — 99207 ZZC FIRST OB VISIT: CPT | Performed by: OBSTETRICS & GYNECOLOGY

## 2019-11-25 PROCEDURE — 87491 CHLMYD TRACH DNA AMP PROBE: CPT | Performed by: OBSTETRICS & GYNECOLOGY

## 2019-11-25 PROCEDURE — 87591 N.GONORRHOEAE DNA AMP PROB: CPT | Performed by: OBSTETRICS & GYNECOLOGY

## 2019-11-25 PROCEDURE — 92004 COMPRE OPH EXAM NEW PT 1/>: CPT | Performed by: OPTOMETRIST

## 2019-11-25 PROCEDURE — 92015 DETERMINE REFRACTIVE STATE: CPT | Performed by: OPTOMETRIST

## 2019-11-25 RX ORDER — LORATADINE 10 MG/1
10 TABLET ORAL DAILY
COMMUNITY
End: 2022-10-28

## 2019-11-25 RX ORDER — ALBUTEROL SULFATE 90 UG/1
2 AEROSOL, METERED RESPIRATORY (INHALATION) EVERY 6 HOURS
COMMUNITY
End: 2020-03-14

## 2019-11-25 ASSESSMENT — REFRACTION_WEARINGRX
OD_AXIS: 185
OS_CYLINDER: +0.75
OS_SPHERE: -1.25
OS_AXIS: 185
OD_CYLINDER: +1.75
OD_SPHERE: -1.75

## 2019-11-25 ASSESSMENT — CUP TO DISC RATIO
OS_RATIO: 0.25
OD_RATIO: 0.4

## 2019-11-25 ASSESSMENT — REFRACTION_MANIFEST
OD_CYLINDER: +1.50
OS_CYLINDER: +0.50
OD_AXIS: 007
OS_SPHERE: -1.25
OS_AXIS: 171
OD_SPHERE: -2.50

## 2019-11-25 ASSESSMENT — VISUAL ACUITY
OS_SC+: -1
CORRECTION_TYPE: GLASSES
OD_SC: 20/80
METHOD: SNELLEN - LINEAR
OD_CC: 20/30-2
OS_CC: 20/20
OS_SC: 20/25
OD_CC: 20/20
OS_CC: 20/20

## 2019-11-25 ASSESSMENT — SLIT LAMP EXAM - LIDS
COMMENTS: NORMAL
COMMENTS: NORMAL

## 2019-11-25 ASSESSMENT — EXTERNAL EXAM - LEFT EYE: OS_EXAM: NORMAL

## 2019-11-25 ASSESSMENT — CONF VISUAL FIELD
OS_NORMAL: 1
OD_NORMAL: 1
METHOD: COUNTING FINGERS

## 2019-11-25 ASSESSMENT — EXTERNAL EXAM - RIGHT EYE: OD_EXAM: NORMAL

## 2019-11-25 ASSESSMENT — TONOMETRY
OS_IOP_MMHG: 18
IOP_METHOD: APPLANATION
OD_IOP_MMHG: 18

## 2019-11-25 NOTE — LETTER
11/25/2019         RE: Arely Carranza  [address]  [place] [address]        Dear Colleague,    Thank you for referring your patient, Arely Carranza, to the [facility]. Please see a copy of my visit note below.    Chief Complaint   Patient presents with     Annual Eye Exam         Pt is 8 weeks pregnant. Pt would like an updated prescription.      Last Eye Exam: 2-3 years  Dilated Previously: Declined dilation today    What are you currently using to see?  Glasses - Pt presents today with sunglasses       Distance Vision Acuity: Satisfied with vision    Near Vision Acuity: Satisfied with vision while reading  with glasses or without    Eye Comfort: good  Do you use eye drops? 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       OBJECTIVE: See Ophthalmology exam    ASSESSMENT:    ICD-10-CM    1. Encounter for examination of eyes and vision without abnormal findings Z01.00    2. Myopia of both eyes H52.13    3. Regular astigmatism of both eyes H52.223       PLAN:     Patient Instructions   Arely was advised of today's exam findings.  Fill glasses prescription  Allow 2 weeks to adapt to change in glasses  Copy of glasses Rx provided today.    Dilation deferred today. Undilated ocular health examination within normal limits.     Return in 1 year for eye exam, or sooner if needed.    Louie Sparks O.D.  [address] [place]  [address]    [phone]           Again, thank you for allowing me to participate in the care of your patient.        Sincerely,        Louie Sparks, OD

## 2019-11-25 NOTE — PROGRESS NOTES
"GHASSAN: 2020, 7w1d by Ultrasound   SUBJECTIVE:     HPI:  Arely Carranza is a 34 year old  at 8w5d by 7w1d ultrasound who presents today for new ob visit.  Patient reports that overall she has been feeling well this pregnancy.  She states that she has not had much low abdominal cramping.  She states that she has had a little bit of brownish discolored discharge twice on her underwear this pregnancy with the last time being two weeks ago.  She states that she changed her prenatal vitamin to taking it at night, which signficantly helped her daytime nausea.  She has not had any vomiting.  She denies any vaginal discharge, fever, chills, headache, changes in vision, chest pain, chest pressure, shortness of breath, diarrhea, or edema.  She does report right leg varicose veins which are non-painful and she wear support stocking for.  She also notes some constipation.      OBGYN Hx:    Last delivery at [place], records obtained. Elective IOL with favorable pickens score.  Start with pitocin.  Patient reports 4 hours of pushing and VAVD with \"mild\" shoulder dystocia that required repositioning for resolution.  Upon review of [place] records from 2016 \"VAVD performed with the mighty vac = 2 contractions, no pop-offs  Head delivered in OA position. Left anterior shoulder did not spontaneously deliver. Lily position followed by counterclockwise fetal rotation of the shoulders and delivery of the right posterior shoulder then left anterior shoulder.   Baby to maternal abdomen. Peds present for evaluation. Delayed cord clamping performed per peds recommendation. Baby vigorous at delivery and freely moving both arms\"  Denies any history of gestational diabetes.  Delivery at 40w6d      LMP- 19  Menses- regular q21-28 days  STD history- none  Pap history- 19- NIL, HPV negative.  Next pap due     PMH- asthma, currently well controlled.  Triggers include URI.  Patient last year had " pneumonia, which required treatment with a steroid taper.  Last used albuterol a couple of months ago    PSH- foot surgery for a bone cyst, wisdom tooth removal    Meds- PNV, claritin    Allergies- PCN, sulfa    SH- patient denies any tobacco, alcohol, or drug use    FH- patient's mother with ovarian cancer at age 57, mucinous type  Maternal grandfather with prostate cancer and melanoma  Paternal grandfather with multiple myeloma      OBJECTIVE:     EXAM:  /66   Pulse 70   Wt 55.3 kg (122 lb)   LMP 2019   BMI 19.40 kg/m   Body mass index is 19.4 kg/m .    GENERAL: healthy, alert and no distress  EYES: Eyes grossly normal to inspection, conjunctivae and sclerae normal  NECK: no adenopathy, no asymmetry, masses, or scars and thyroid normal to palpation  RESP: lungs clear to auscultation - no rales, rhonchi or wheezes  BREAST: normal without masses, tenderness or nipple discharge and no palpable axillary masses or adenopathy  CV: regular rate and rhythm  ABDOMEN: soft, nontender, no masses    (female): normal female external genitalia, normal urethral meatus, vaginal mucosa pink, moist, well rugated, cervix appears multiparous and had a small amount of cervical ectropion, not bleeding   MS: no gross musculoskeletal defects noted, right lower extremity with non-tender varicose veins   SKIN: no suspicious lesions or rashes  NEURO: Normal strength and tone, mentation intact and speech normal  PSYCH: mentation appears normal, affect normal/bright    BSUS- IUP with + cardiac activity    ASSESSMENT/PLAN:     A&P: Arely Elena Dillon is a 34 year old  at 8w5d by 7w1d ultrasound who presents today for new ob visit.    (Z34.91) Prenatal care in first trimester  (primary encounter diagnosis)  Comment: Discussed schedule of visit, team structure, and delivery location.  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  She is ok with resident participation, but prefers that if for  "some reason she needs a , that they are in the assist role.  We discussed genetic testing in pregnancy and she does opt for genetic screening.  Reviewed patient's Rh negative status and that if she has bleeding in pregnancy she will need rhogam.  Also reviewed rhogam routine administration at 28 weeks and rhogam eval at time of delivery. Reviewed recommended weight gain in pregnancy.  Patient does have a history of VAVD and shoulder dystocia.  Patient describes it as \"mild\", but after visit was able to access outside records and appears needed wood screw and brett [place] to deliver baby.  Will discuss with patient at future visit delivery options and review option of  for history of shoulder dystocia.   Plan: M appointment for genetic screening scheduled for 19.    Plan for next prenatal visit in 4 weeks    (Z11.3) Screen for STD (sexually transmitted disease)  Comment: routine GC/CT testing collected today  Plan: NEISSERIA GONORRHOEA PCR, CHLAMYDIA TRACHOMATIS        PCR    (J45.909) Uncomplicated asthma, unspecified asthma severity, unspecified whether persistent  Comment: Patient's asthma under good control at this time   Plan: continue albuterol PRN    Shruthi Keys MD  "

## 2019-11-25 NOTE — PROGRESS NOTES
Chief Complaint   Patient presents with     Annual Eye Exam         Pt is 8 weeks pregnant. Pt would like an updated prescription.      Last Eye Exam: 2-3 years  Dilated Previously: Declined dilation today    What are you currently using to see?  Glasses - Pt presents today with sunglasses       Distance Vision Acuity: Satisfied with vision    Near Vision Acuity: Satisfied with vision while reading  with glasses or without    Eye Comfort: good  Do you use eye drops? 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       OBJECTIVE: See Ophthalmology exam    ASSESSMENT:    ICD-10-CM    1. Encounter for examination of eyes and vision without abnormal findings Z01.00    2. Myopia of both eyes H52.13    3. Regular astigmatism of both eyes H52.223       PLAN:     Patient Instructions   Arely was advised of today's exam findings.  Fill glasses prescription  Allow 2 weeks to adapt to change in glasses  Copy of glasses Rx provided today.    Dilation deferred today. Undilated ocular health examination within normal limits.     Return in 1 year for eye exam, or sooner if needed.    Louie Sparks O.D.  [address] [facility] [place]  [address]    [phone]

## 2019-11-25 NOTE — PATIENT INSTRUCTIONS
Patient Education     Adapting to Pregnancy: First Trimester    As your body adjusts, you may have to change or limit your daily activities. You ll need more rest. You may also need to use the energy you have more wisely.  Your changing body  Almost every part of your body is affected as you adapt to pregnancy. The uterus and cervix will begin to soften right away. You may not look very pregnant during the first 3 months. But you are likely to have some common signs of early pregnancy:    Nausea    Fatigue    Frequent urination    Mood swings    Bloating of the belly    Constipation    Heartburn    Missed or light periods (first trimester bleeding)    Nipple or breast tenderness and breast swelling  It s not too late to start good habits  What matters most is protecting your baby from this moment on. If you smoke, drink alcohol, or use drugs, now is the time to stop. If you need help, talk with your healthcare provider:    Smoking increases the risk of stillbirth or having a low-birth-weight baby. If you smoke, quit now.    Alcohol and drugs have been linked with miscarriage, birth defects, intellectual disability, and low birth weight. Do not drink alcohol or take drugs.  Tips to relieve nausea  Although nausea can happen at any time of the day, it may be worse in the morning. To help prevent nausea:    Eat small, light meals at frequent intervals.    Drink fluids often.    Get up slowly. Eat a few unsalted crackers before you get out of bed.    Avoid smells that bother you.    Avoid spicy and fatty foods.    Eat an ice pop in your favorite flavor.    Get plenty of rest.    Ask your healthcare provider about taking naun or vitamin B6 for nausea and vomiting.    Talk with your healthcare provider if you take vitamins that upset your stomach.  Work concerns  The end of the first trimester is a good time to discuss working during pregnancy with your employer. Follow your healthcare provider s advice if your job  "needs you to stand for a long time, work with hazardous tools, or even sit at a desk all day. Your workspace, workload, or scheduled hours may need to be adjusted. Perhaps you can change body postures more often or take an extra break.  Advice for travel  Talk to your healthcare provider first, but the second trimester may be the best time for any travel. You may be advised to avoid certain trips while you re pregnant. Food and water can be concerns in developing countries. Travel by car is a good choice, as you can stop, get out, and stretch. Bring snacks and water along. Fasten the lap belt below your belly, low over your hips. Also be sure to wear the shoulder harness.  Intimacy  Unless your healthcare provider tells you to, there is no reason to stop having sex while you re pregnant. You or your partner may notice changes in desire. Desire may be less in the first trimester, due to nausea and fatigue. In the second trimester, sex may be very enjoyable. The third trimester can be a challenge comfort-wise. Try different positions and see what s best for you both.  Date Last Reviewed: 10/1/2017    6205-6751 [employer]. [address] [place] [address]. All rights reserved. This information is not intended as a substitute for professional medical care. Always follow your healthcare professional's instructions.           Patient Education     Pregnancy: Your First Trimester Changes  The first trimester is a time of rapid development for your baby. Because your baby is growing so quickly, it is important that you start a healthy lifestyle right away. By the end of the first trimester, your baby has formed all of its major body organs and weighs just over an ounce.     Actual size of baby is 1/4\"    Month 1 (weeks 1 to 4)  The placenta (the organ that nourishes your baby) begins to form. The brain, spinal cord, heart, gastrointestinal tract, and lungs begin to develop. Your baby is about " "1/4-inch long by the end of the first month.     Actual size of baby is 1\"      Month 2 (weeks 5 to 8)  All of your baby s major body organs form. The face, fingers, toes, ears, and eyes appear. By the end of the month, your baby is about 1-inch long.     Actual size of baby is 3\"      Month 3 (weeks 9 to 12)  Your baby can open and close its fists and mouth. The sexual organs begin to form. As the first trimester ends, your baby is about 3-inches long.  Date Last Reviewed: 10/1/2017    3448-9177 [employer]. [address] [place] [address], [place] [address]. All rights reserved. This information is not intended as a substitute for professional medical care. Always follow your healthcare professional's instructions.           "

## 2019-11-25 NOTE — NURSING NOTE
"Chief Complaint   Patient presents with     Prenatal Care       Initial /66   Pulse 70   Wt 55.3 kg (122 lb)   LMP 2019   BMI 19.40 kg/m   Estimated body mass index is 19.4 kg/m  as calculated from the following:    Height as of 10/31/19: 1.689 m (5' 6.5\").    Weight as of this encounter: 55.3 kg (122 lb).  BP completed using cuff size: regular    Questioned patient about current smoking habits.  Pt. has never smoked.          The following HM Due: NONE      The following patient reported/Care Every where data was sent to:  P ABSTRACT QUALITY INITIATIVES [[id number]]        n/a            Katrhyn Durán MA        "

## 2019-11-25 NOTE — PATIENT INSTRUCTIONS
Arely was advised of today's exam findings.  Fill glasses prescription  Allow 2 weeks to adapt to change in glasses  Copy of glasses Rx provided today.    Dilation deferred today. Undilated ocular health examination within normal limits.     Return in 1 year for eye exam, or sooner if needed.    Louie Sparks O.D.  [address] [place]  [address]    [phone]

## 2019-11-26 PROBLEM — Z87.59 HISTORY OF VACUUM EXTRACTION ASSISTED DELIVERY: Status: ACTIVE | Noted: 2019-11-26

## 2019-11-26 PROBLEM — O26.899 RH NEGATIVE STATE IN ANTEPARTUM PERIOD: Status: ACTIVE | Noted: 2019-11-26

## 2019-11-26 PROBLEM — Z87.59 HISTORY OF SHOULDER DYSTOCIA IN PRIOR PREGNANCY: Status: ACTIVE | Noted: 2019-11-26

## 2019-11-26 PROBLEM — Z67.91 RH NEGATIVE STATE IN ANTEPARTUM PERIOD: Status: ACTIVE | Noted: 2019-11-26

## 2019-11-26 LAB
C TRACH DNA SPEC QL NAA+PROBE: NEGATIVE
N GONORRHOEA DNA SPEC QL NAA+PROBE: NEGATIVE
SPECIMEN SOURCE: NORMAL
SPECIMEN SOURCE: NORMAL

## 2019-12-13 ENCOUNTER — PRE VISIT (OUTPATIENT)
Dept: MATERNAL FETAL MEDICINE | Facility: CLINIC | Age: 34
End: 2019-12-13

## 2019-12-17 ENCOUNTER — HOSPITAL ENCOUNTER (OUTPATIENT)
Dept: ULTRASOUND IMAGING | Facility: CLINIC | Age: 34
Discharge: HOME | End: 2019-12-17
Attending: OBSTETRICS & GYNECOLOGY | Admitting: OBSTETRICS & GYNECOLOGY
Payer: COMMERCIAL

## 2019-12-17 ENCOUNTER — OFFICE VISIT (OUTPATIENT)
Dept: MATERNAL FETAL MEDICINE | Facility: CLINIC | Age: 34
End: 2019-12-17
Attending: OBSTETRICS & GYNECOLOGY
Payer: COMMERCIAL

## 2019-12-17 DIAGNOSIS — O26.90 PREGNANCY RELATED CONDITION, ANTEPARTUM: ICD-10-CM

## 2019-12-17 DIAGNOSIS — Z36.9 FIRST TRIMESTER SCREENING: Primary | ICD-10-CM

## 2019-12-17 DIAGNOSIS — Z13.79 ENCOUNTER FOR GENETIC SCREENING FOR DOWN SYNDROME: ICD-10-CM

## 2019-12-17 DIAGNOSIS — Z13.79 ENCOUNTER FOR GENETIC SCREENING FOR DOWN SYNDROME: Primary | ICD-10-CM

## 2019-12-17 PROCEDURE — 96040 ZZH GENETIC COUNSELING, EACH 30 MINUTES: CPT | Mod: ZF | Performed by: GENETIC COUNSELOR, MS

## 2019-12-17 PROCEDURE — 40000791 ZZHCL STATISTIC VERIFI PRENATAL TRISOMY 21,18,13: Performed by: OBSTETRICS & GYNECOLOGY

## 2019-12-17 PROCEDURE — 76813 OB US NUCHAL MEAS 1 GEST: CPT

## 2019-12-17 PROCEDURE — 36415 COLL VENOUS BLD VENIPUNCTURE: CPT | Performed by: OBSTETRICS & GYNECOLOGY

## 2019-12-17 NOTE — PROGRESS NOTES
"Please see \"Imaging\" tab under \"Chart Review\" for details of today's US.    Ida Moraes, DO    "

## 2019-12-17 NOTE — PROGRESS NOTES
"[facility] [place]  Genetic Counseling Consult    Patient: Arely Carranza YOB: 1985   Date of Service: 19      Arely Carranza was seen at [facility] [place] for genetic consultation to discuss the options for routine screening for fetal chromosome abnormalities. She was accompanied to today's visit by her partner, Bal.      Impression/Plan:   1.  Arely had an ultrasound and blood draw for NIPT (Innatal test through [facility]).  Results are expected within 7-10 days, and will be available in [employer].  We will contact her to discuss the results, and a copy will be forwarded to the office of the referring OB provider. She does not desire results to be left in a voice message. Arely requests to be informed of completed results ([phone]) and she will call back to review them in detail.    2. Maternal serum AFP (single marker screen) is recommended after 15 weeks to screen for open neural tube defects. A quad screen should not be performed.    Pregnancy History:   /Parity:    Age at Delivery: 35 year old  GHASSAN: 2020, by Ultrasound  Gestational Age: 11w6d    No significant complications or exposures were reported in the current pregnancy.    Arely ashraf pregnancy history is significant for:  o 2016: term, vaginal delivery, male (different partner)    Medical History:   Arely ashraf reported medical history is not expected to impact pregnancy management or risks to fetal development.       Family History:   The following significant findings were reported by Arely:    Bal's paternal aunt had a \"hole in her heart\" that required surgical intervention at a young age. Congenital heart defects are common, occurring in 5 to 10 per 1000 live births. One type of congenital heart defect commonly referred to as a   hole in the heart  is a patent foramen ovale, however there are many different abnormalities that can be " "referred to as a \"hole in the heart\". The specific recurrence risk differs according to the type of congenital heart defect, degree of relationship, and if there is an associated genetic syndrome present.     Otherwise, the reported family history is negative for multiple miscarriages, stillbirths, birth defects, mental retardation, known genetic conditions, and consanguinity.       Carrier Screening:   The patient reports that she and the father of the pregnancy have  ancestry:      Cystic fibrosis is an autosomal recessive genetic condition that occurs with increased frequency in individuals of  ancestry and carrier screening for this condition is available.  In addition,  screening in the [facility] includes cystic fibrosis.      Expanded carrier screening for mutations in a large panel of genes associated with autosomal recessive conditions including cystic fibrosis, spinal muscular atrophy, and others, is now available.      The patient reports completing carrier screening in the past. 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 A copy of the report was not available for our review today. Arely does not desire any further screening for herself or her current partner.       Risk Assessment for Chromosome Conditions:   We explained that the risk for fetal chromosome abnormalities increases with maternal age. We discussed specific features of common chromosome abnormalities, including Down syndrome, trisomy 13, trisomy 18, and sex chromosome trisomies.      - At age 35 at midtrimester, the risk to have a baby with Down syndrome is 1 in 342.     - At age 35 at midtrimester, the risk to have a baby with any chromosome abnormality is 1 in  172.     Testing Options:   We discussed the following options:   First trimester screening    First trimester ultrasound with nuchal translucency and nasal bone assessments, maternal plasma hCG, GEOFF-A, and AFP " measurement    Screens for fetal trisomy 21, trisomy 13, and trisomy 18    Cannot screen for open neural tube defects; maternal serum AFP after 15 weeks is recommended     Non-invasive Prenatal Testing (NIPT)    Maternal plasma cell-free DNA testing; first trimester ultrasound with nuchal translucency and nasal bone assessment is recommended, when appropriate    Screens for fetal trisomy 21, trisomy 13, trisomy 18, and sex chromosome aneuploidy    Cannot screen for open neural tube defects; maternal serum AFP after 15 weeks is recommended      We reviewed the benefits and limitations of this testing.  Screening tests provide a risk assessment specific to the pregnancy for certain fetal chromosome abnormalities, but cannot definitively diagnose or exclude a fetal chromosome abnormality.  Follow-up genetic counseling and consideration of diagnostic testing is recommended with any abnormal screening result.        It was a pleasure to be involved with Arely [facility]. Face-to-face time of the meeting was 20 minutes.    Myriam Collins MS, [facility]  Maternal Fetal Medicine  [facility]  Ph: [phone]  [email or web address]

## 2019-12-23 ENCOUNTER — TELEPHONE (OUTPATIENT)
Dept: MATERNAL FETAL MEDICINE | Facility: CLINIC | Age: 34
End: 2019-12-23

## 2019-12-23 NOTE — TELEPHONE ENCOUNTER
"12/23/2019    I called Arely to discuss her normal \"Innatal\" cell-free fetal DNA screening results.  Results came back negative for chromosome abnormalities in chromosomes 21, 18, & 13, as well as the sex chromosomes.  These normal results suggest the likelihood of fetal Down syndrome, trisomy 13, or trisomy 18 is very low. Results consistent with two sex chromosomes (See scanned report under lab tab in epic). Sex chromosome information was left in an envelope at [facility] for patient .    Discussed high detection rates for fetal Down syndrome, trisomies 13 and 18, and fetal sex chromosome abnormalities; however, not 100%. While this test is a highly accurate screen, it does not replace the diagnostic capabilities of standard prenatal diagnostic tests such as amniocentesis and CVS. Arely indicated her understanding and currently declines prenatal diagnosis.       Myriam Collins MS, [facility]  Licensed Genetic Counselor  Phone: [phone]  Pager: [phone]    "

## 2019-12-24 LAB — LAB SCANNED RESULT: NORMAL

## 2019-12-30 ENCOUNTER — PRENATAL OFFICE VISIT (OUTPATIENT)
Dept: OBGYN | Facility: CLINIC | Age: 34
End: 2019-12-30
Payer: COMMERCIAL

## 2019-12-30 VITALS
WEIGHT: 128 LBS | DIASTOLIC BLOOD PRESSURE: 64 MMHG | BODY MASS INDEX: 20.35 KG/M2 | OXYGEN SATURATION: 100 % | HEART RATE: 72 BPM | SYSTOLIC BLOOD PRESSURE: 106 MMHG

## 2019-12-30 DIAGNOSIS — Z34.80 SUPERVISION OF OTHER NORMAL PREGNANCY, ANTEPARTUM: Primary | ICD-10-CM

## 2019-12-30 PROCEDURE — 99207 ZZC PRENATAL VISIT: CPT | Performed by: OBSTETRICS & GYNECOLOGY

## 2019-12-30 NOTE — PROGRESS NOTES
Will plan [facility] survey ultrasound.  Discussed dating, 7/1/20 is most reliable GHASSAN.  Discussed Dr. Keys's last note, prior delivery complications.  She hopes to avoid C/S, wonders about IOL at 39 weeks, will discuss options further as pregnancy progresses.  RTC 4 weeks.  AM

## 2020-01-31 ENCOUNTER — PRENATAL OFFICE VISIT (OUTPATIENT)
Dept: OBGYN | Facility: CLINIC | Age: 35
End: 2020-01-31
Payer: COMMERCIAL

## 2020-01-31 ENCOUNTER — OFFICE VISIT (OUTPATIENT)
Dept: MATERNAL FETAL MEDICINE | Facility: CLINIC | Age: 35
End: 2020-01-31
Attending: OBSTETRICS & GYNECOLOGY
Payer: COMMERCIAL

## 2020-01-31 ENCOUNTER — HOSPITAL ENCOUNTER (OUTPATIENT)
Dept: ULTRASOUND IMAGING | Facility: CLINIC | Age: 35
Discharge: HOME | End: 2020-01-31
Attending: OBSTETRICS & GYNECOLOGY | Admitting: OBSTETRICS & GYNECOLOGY
Payer: COMMERCIAL

## 2020-01-31 VITALS
BODY MASS INDEX: 20.35 KG/M2 | WEIGHT: 128 LBS | TEMPERATURE: 97.4 F | SYSTOLIC BLOOD PRESSURE: 110 MMHG | HEART RATE: 60 BPM | DIASTOLIC BLOOD PRESSURE: 64 MMHG

## 2020-01-31 DIAGNOSIS — Z36.89 ENCOUNTER FOR FETAL ANATOMIC SURVEY: Primary | ICD-10-CM

## 2020-01-31 DIAGNOSIS — O26.90 PREGNANCY RELATED CONDITION, ANTEPARTUM: ICD-10-CM

## 2020-01-31 DIAGNOSIS — Z34.80 SUPERVISION OF OTHER NORMAL PREGNANCY, ANTEPARTUM: Primary | ICD-10-CM

## 2020-01-31 PROCEDURE — 36415 COLL VENOUS BLD VENIPUNCTURE: CPT | Performed by: OBSTETRICS & GYNECOLOGY

## 2020-01-31 PROCEDURE — 76811 OB US DETAILED SNGL FETUS: CPT

## 2020-01-31 PROCEDURE — 82105 ALPHA-FETOPROTEIN SERUM: CPT | Mod: 90 | Performed by: OBSTETRICS & GYNECOLOGY

## 2020-01-31 PROCEDURE — 99207 ZZC PRENATAL VISIT: CPT | Performed by: OBSTETRICS & GYNECOLOGY

## 2020-01-31 PROCEDURE — 99000 SPECIMEN HANDLING OFFICE-LAB: CPT | Performed by: OBSTETRICS & GYNECOLOGY

## 2020-01-31 NOTE — PROGRESS NOTES
Please see full imaging report from [employer] program under imaging tab.    Nita Mckeon MD  Maternal Fetal Medicine

## 2020-01-31 NOTE — PROGRESS NOTES
18w2d  Doing well. No concerns.   Fetal survey today wnl. Anterior placenta, no previa, 3vc, normal fluid.  msAFP today.   RTC 4 weeks, sooner PRN.

## 2020-02-04 LAB
# FETUSES US: NORMAL
# FETUSES: NORMAL
AFP ADJ MOM AMN: 1.56
AFP SERPL-MCNC: 77 NG/ML
AGE - REPORTED: 34.2 YR
CURRENT SMOKER: NO
CURRENT SMOKER: NO
DIABETES STATUS PATIENT: NO
FAMILY MEMBER DISEASES HX: NO
FAMILY MEMBER DISEASES HX: NO
GA METHOD: NORMAL
GA METHOD: NORMAL
GA: NORMAL WK
IDDM PATIENT QL: NO
INTEGRATED SCN PATIENT-IMP: NORMAL
LMP START DATE: NORMAL
MONOCHORIONIC TWINS: NO
SERVICE CMNT-IMP: NO
SPECIMEN DRAWN SERPL: NORMAL
VALPROIC/CARBAMAZEPINE STATUS: NO
WEIGHT UNITS: NORMAL

## 2020-03-05 ENCOUNTER — PRENATAL OFFICE VISIT (OUTPATIENT)
Dept: OBGYN | Facility: CLINIC | Age: 35
End: 2020-03-05
Payer: COMMERCIAL

## 2020-03-05 ENCOUNTER — TRANSCRIBE ORDERS (OUTPATIENT)
Dept: MATERNAL FETAL MEDICINE | Facility: CLINIC | Age: 35
End: 2020-03-05

## 2020-03-05 VITALS
DIASTOLIC BLOOD PRESSURE: 70 MMHG | BODY MASS INDEX: 21.94 KG/M2 | SYSTOLIC BLOOD PRESSURE: 114 MMHG | WEIGHT: 138 LBS | HEART RATE: 63 BPM | OXYGEN SATURATION: 96 %

## 2020-03-05 DIAGNOSIS — O26.90 PREGNANCY RELATED CONDITION, ANTEPARTUM: Primary | ICD-10-CM

## 2020-03-05 DIAGNOSIS — O09.299 H/O SHOULDER DYSTOCIA IN PRIOR PREGNANCY, CURRENTLY PREGNANT: ICD-10-CM

## 2020-03-05 DIAGNOSIS — Z34.80 SUPERVISION OF OTHER NORMAL PREGNANCY, ANTEPARTUM: Primary | ICD-10-CM

## 2020-03-05 PROCEDURE — 99207 ZZC PRENATAL VISIT: CPT | Performed by: OBSTETRICS & GYNECOLOGY

## 2020-03-05 NOTE — PROGRESS NOTES
Feels well.  MFM advises growth ultrasound in 3rd trimester, will schedule.  Plans one hour glucose.  RTC 4 weeks.  AM

## 2020-03-14 DIAGNOSIS — O21.9 NAUSEA/VOMITING IN PREGNANCY: ICD-10-CM

## 2020-03-14 DIAGNOSIS — J45.20 MILD INTERMITTENT ASTHMA WITHOUT COMPLICATION: Primary | ICD-10-CM

## 2020-03-14 RX ORDER — ALBUTEROL SULFATE 90 UG/1
2 AEROSOL, METERED RESPIRATORY (INHALATION) EVERY 6 HOURS PRN
Qty: 1 INHALER | Refills: 3 | Status: SHIPPED | OUTPATIENT
Start: 2020-03-14 | End: 2022-10-28

## 2020-03-14 RX ORDER — ONDANSETRON 4 MG/1
4 TABLET, FILM COATED ORAL EVERY 8 HOURS PRN
Qty: 30 TABLET | Refills: 3 | Status: ON HOLD | OUTPATIENT
Start: 2020-03-14 | End: 2020-06-25

## 2020-03-23 DIAGNOSIS — Z34.80 SUPERVISION OF OTHER NORMAL PREGNANCY, ANTEPARTUM: Primary | ICD-10-CM

## 2020-04-06 ENCOUNTER — PRENATAL OFFICE VISIT (OUTPATIENT)
Dept: OBGYN | Facility: CLINIC | Age: 35
End: 2020-04-06
Payer: COMMERCIAL

## 2020-04-06 VITALS
DIASTOLIC BLOOD PRESSURE: 63 MMHG | TEMPERATURE: 98 F | BODY MASS INDEX: 22.37 KG/M2 | SYSTOLIC BLOOD PRESSURE: 106 MMHG | HEART RATE: 71 BPM | OXYGEN SATURATION: 98 % | WEIGHT: 140.7 LBS

## 2020-04-06 DIAGNOSIS — O26.899 RH NEGATIVE STATE IN ANTEPARTUM PERIOD: ICD-10-CM

## 2020-04-06 DIAGNOSIS — Z34.81 ENCOUNTER FOR SUPERVISION OF OTHER NORMAL PREGNANCY IN FIRST TRIMESTER: Primary | ICD-10-CM

## 2020-04-06 DIAGNOSIS — Z34.80 SUPERVISION OF OTHER NORMAL PREGNANCY, ANTEPARTUM: Primary | ICD-10-CM

## 2020-04-06 DIAGNOSIS — Z23 NEED FOR TDAP VACCINATION: ICD-10-CM

## 2020-04-06 DIAGNOSIS — Z67.91 RH NEGATIVE STATE IN ANTEPARTUM PERIOD: ICD-10-CM

## 2020-04-06 LAB
BLD GP AB SCN SERPL QL: NORMAL
GLUCOSE 1H P 50 G GLC PO SERPL-MCNC: 188 MG/DL (ref 60–129)

## 2020-04-06 PROCEDURE — 82950 GLUCOSE TEST: CPT | Performed by: OBSTETRICS & GYNECOLOGY

## 2020-04-06 PROCEDURE — 99207 ZZC PRENATAL VISIT: CPT | Performed by: OBSTETRICS & GYNECOLOGY

## 2020-04-06 PROCEDURE — 90715 TDAP VACCINE 7 YRS/> IM: CPT | Performed by: OBSTETRICS & GYNECOLOGY

## 2020-04-06 PROCEDURE — 96372 THER/PROPH/DIAG INJ SC/IM: CPT | Performed by: OBSTETRICS & GYNECOLOGY

## 2020-04-06 PROCEDURE — 90471 IMMUNIZATION ADMIN: CPT | Performed by: OBSTETRICS & GYNECOLOGY

## 2020-04-06 PROCEDURE — 86850 RBC ANTIBODY SCREEN: CPT | Performed by: OBSTETRICS & GYNECOLOGY

## 2020-04-06 ASSESSMENT — ANXIETY QUESTIONNAIRES
1. FEELING NERVOUS, ANXIOUS, OR ON EDGE: MORE THAN HALF THE DAYS
3. WORRYING TOO MUCH ABOUT DIFFERENT THINGS: NOT AT ALL
GAD7 TOTAL SCORE: 8
5. BEING SO RESTLESS THAT IT IS HARD TO SIT STILL: NOT AT ALL
6. BECOMING EASILY ANNOYED OR IRRITABLE: SEVERAL DAYS
IF YOU CHECKED OFF ANY PROBLEMS ON THIS QUESTIONNAIRE, HOW DIFFICULT HAVE THESE PROBLEMS MADE IT FOR YOU TO DO YOUR WORK, TAKE CARE OF THINGS AT HOME, OR GET ALONG WITH OTHER PEOPLE: SOMEWHAT DIFFICULT
7. FEELING AFRAID AS IF SOMETHING AWFUL MIGHT HAPPEN: MORE THAN HALF THE DAYS
2. NOT BEING ABLE TO STOP OR CONTROL WORRYING: SEVERAL DAYS

## 2020-04-06 ASSESSMENT — PATIENT HEALTH QUESTIONNAIRE - PHQ9
SUM OF ALL RESPONSES TO PHQ QUESTIONS 1-9: 8
5. POOR APPETITE OR OVEREATING: MORE THAN HALF THE DAYS

## 2020-04-06 NOTE — PROGRESS NOTES
27w5d feeling good, no c/o.  Good fm.  glucola today along with Tdap and rhogam.  FHT were around 110 on doptone, placed on NST machine.    NST: essentially unable to get continuous tracing due to +++++FM.  When holding able to get brief periods with baseline 115 and accels into 140-150.  Audible monitoring by myself which confirmed FHR baseline 115.    She is on call for labor tonight, will have doptone check later today while there.    Discussed modified PNV schedule moving forward due to COVID pandemic.  Discussed modifed MD schedules due to COVID pandemic. Plan phone visit in 4 weeks, in office in 6.  neftaly

## 2020-04-07 ASSESSMENT — ANXIETY QUESTIONNAIRES: GAD7 TOTAL SCORE: 8

## 2020-04-13 DIAGNOSIS — Z34.81 ENCOUNTER FOR SUPERVISION OF OTHER NORMAL PREGNANCY IN FIRST TRIMESTER: ICD-10-CM

## 2020-04-13 LAB
GLUCOSE 1H P 100 G GLC PO SERPL-MCNC: 139 MG/DL (ref 60–179)
GLUCOSE 2H P 100 G GLC PO SERPL-MCNC: 130 MG/DL (ref 60–154)
GLUCOSE 3H P 100 G GLC PO SERPL-MCNC: 101 MG/DL (ref 60–139)
GLUCOSE P FAST SERPL-MCNC: 75 MG/DL (ref 60–94)

## 2020-04-13 PROCEDURE — 36415 COLL VENOUS BLD VENIPUNCTURE: CPT | Performed by: OBSTETRICS & GYNECOLOGY

## 2020-04-13 PROCEDURE — 82952 GTT-ADDED SAMPLES: CPT | Performed by: OBSTETRICS & GYNECOLOGY

## 2020-04-13 PROCEDURE — 82951 GLUCOSE TOLERANCE TEST (GTT): CPT | Performed by: OBSTETRICS & GYNECOLOGY

## 2020-04-24 ENCOUNTER — PRENATAL OFFICE VISIT (OUTPATIENT)
Dept: OBGYN | Facility: CLINIC | Age: 35
End: 2020-04-24
Payer: COMMERCIAL

## 2020-04-24 VITALS
OXYGEN SATURATION: 97 % | DIASTOLIC BLOOD PRESSURE: 65 MMHG | BODY MASS INDEX: 22.62 KG/M2 | TEMPERATURE: 97.6 F | SYSTOLIC BLOOD PRESSURE: 114 MMHG | WEIGHT: 142.3 LBS | HEART RATE: 77 BPM

## 2020-04-24 DIAGNOSIS — O26.899 RH NEGATIVE STATE IN ANTEPARTUM PERIOD: ICD-10-CM

## 2020-04-24 DIAGNOSIS — O26.843 SIZE OF FETUS INCONSISTENT WITH DATES IN THIRD TRIMESTER: ICD-10-CM

## 2020-04-24 DIAGNOSIS — Z34.80 SUPERVISION OF OTHER NORMAL PREGNANCY, ANTEPARTUM: Primary | ICD-10-CM

## 2020-04-24 DIAGNOSIS — Z67.91 RH NEGATIVE STATE IN ANTEPARTUM PERIOD: ICD-10-CM

## 2020-04-24 PROCEDURE — 99207 ZZC PRENATAL VISIT: CPT | Performed by: OBSTETRICS & GYNECOLOGY

## 2020-04-24 NOTE — PROGRESS NOTES
[facility]- OBGYN    CC: routine prenatal visit.    S:Arely Carranza is a 35 year old  at 30w2d by 7w1d us who presents today for routine prenatal visit.  Patient reports she is feeling ok.  She is having some tahir hick's contractions.  Patient denies any vaginal bleeding, leakage of fluid.  She states she is feeling normal fetal movement.     Pregnancy notable for:  -h/o VAVD  -h/o SD  -failed GCT, passed GTT    O:   Patient Vitals for the past 24 hrs:   BP Temp Temp src Pulse SpO2 Weight   20 1341 114/65 97.6  F (36.4  C) Oral 77 97 % 64.5 kg (142 lb 4.8 oz)   ]  Exam:  General- awake, alert, answering questions appropriately, appears comfortable  CV- regular rate  Lung- breathing comfortably on room air  Ext- bilateral lower extremities with no edema    Doptones- 138 bpm  Fundal height-26 cm, likely fundal height small due to fetal position     A&P: Arely Carranza is a 35 year old  at 30w2d by 7w1d us who presents today for routine prenatal visit.    (Z34.80) Supervision of other normal pregnancy, antepartum  (primary encounter diagnosis)  Comment: Reviewed s/sx of PTL, PPROM, and kick counts.  Discussed normal weight gain in pregnancy.  Plan: phone visit with Dr. Kulkarni scheduled for     (O26.843) Size of fetus inconsistent with dates in third trimester  Comment: fundal height is >3cm discrepant from dates.  Based on Leopold's likely due to fetal position, but due to difference will plan growth ultrasound.  Plan: US OB >14 Weeks Follow Up    (O26.899,  Z67.91) Rh negative state in antepartum period  Comment: s/p rhogam 2020  Plan: rhogam leah Keys MD

## 2020-04-24 NOTE — PATIENT INSTRUCTIONS
Patient Education     Healthy Eating Habits During Pregnancy    It s important to develop healthy eating habits while you are pregnant, for you as well as for your baby. Here are some ways to stay healthy.  Aim for a healthy weight  A slow, steady rate of weight gain is often best. After the first trimester, you may gain about a pound a week. If you were overweight before pregnancy, you need to gain fewer pounds. Your healthcare provider can give you a healthy weight goal for your pregnancy.  Don t diet  Now is not the time to diet. You may not get enough of the nutrients you and your baby need. Instead, learn how to be a healthy eater. Start by doing it for your baby. Soon, you may do it for yourself.  Vitamins and supplements  Talk with your healthcare provider about taking these and other prenatal vitamins and supplements.    Iron makes the extra blood you need now.    Calcium and vitamin D help build and keep strong bones.    Folic acid helps prevent certain birth defects.    Iodine helps the thyroid work right.    Some vitamins may not be safe to take. Your healthcare provider will tell you which ones to avoid.  Fluids  Drink at least 8 to 10 cups of fluid daily. Your baby needs fluids. Fluids also decrease constipation, flush out toxins and waste, limit swelling, and help prevent bladder infections. Water is best. Other good choices are:    Water or seltzer water with a slice of lemon or lime (These can also help ease an upset stomach.)    Clear soups that are low in salt    Low-fat or fat-free milk, soy or rice milk with calcium added    Popsicles or gelatin  Things to avoid  Some things might harm your growing baby. Don t eat or drink:    Alcohol    Unpasteurized dairy foods and juices    Raw or undercooked meat, poultry, fish, or eggs    Unwashed fruits and vegetables    Prepared meats, like deli meats or hot dogs, unless heated until steaming hot    Fish that are high in mercury, like shark, swordfish,  juan mackerel, tilefish, and albacore tuna  Things to limit  Ask your healthcare provider whether it s safe to eat or drink:    Caffeine    Artificial sweeteners    Organ meats    Certain types of fish    Fish and shellfish that contain mercury in lower amounts, like shrimp, canned light tuna, salmon, pollock, and catfish  Date Last Reviewed: 2/1/2018 2000-2019 [employer]. [address] [place] [address], [place] [address]. All rights reserved. This information is not intended as a substitute for professional medical care. Always follow your healthcare professional's instructions.

## 2020-04-29 ENCOUNTER — HOSPITAL ENCOUNTER (OUTPATIENT)
Dept: ULTRASOUND IMAGING | Facility: CLINIC | Age: 35
Discharge: HOME | End: 2020-04-29
Attending: OBSTETRICS & GYNECOLOGY | Admitting: OBSTETRICS & GYNECOLOGY
Payer: COMMERCIAL

## 2020-04-29 DIAGNOSIS — O26.843 SIZE OF FETUS INCONSISTENT WITH DATES IN THIRD TRIMESTER: ICD-10-CM

## 2020-04-29 LAB — RADIOLOGIST FLAGS: ABNORMAL

## 2020-04-29 PROCEDURE — 76816 OB US FOLLOW-UP PER FETUS: CPT

## 2020-04-30 ENCOUNTER — PRENATAL OFFICE VISIT (OUTPATIENT)
Dept: OBGYN | Facility: CLINIC | Age: 35
End: 2020-04-30
Payer: COMMERCIAL

## 2020-04-30 DIAGNOSIS — Z34.80 SUPERVISION OF OTHER NORMAL PREGNANCY, ANTEPARTUM: Primary | ICD-10-CM

## 2020-04-30 PROCEDURE — 99207 ZZC PRENATAL VISIT: CPT | Performed by: OBSTETRICS & GYNECOLOGY

## 2020-04-30 NOTE — PROGRESS NOTES
Phone visit for modified prenatal care for COVID.  Feels well.  Had growth ultrasound in radiology yesterday, EFW 2043 gm, 91st growth percentile (HC and BPD were 90th, AC 91st).  Has [facility] U/S June 2.  Office visit next, RTC 2 weeks.  AM

## 2020-05-19 ENCOUNTER — PRENATAL OFFICE VISIT (OUTPATIENT)
Dept: OBGYN | Facility: CLINIC | Age: 35
End: 2020-05-19
Payer: COMMERCIAL

## 2020-05-19 VITALS
OXYGEN SATURATION: 96 % | BODY MASS INDEX: 23.58 KG/M2 | SYSTOLIC BLOOD PRESSURE: 110 MMHG | WEIGHT: 148.3 LBS | HEART RATE: 82 BPM | DIASTOLIC BLOOD PRESSURE: 69 MMHG

## 2020-05-19 DIAGNOSIS — Z34.93 PRENATAL CARE IN THIRD TRIMESTER: Primary | ICD-10-CM

## 2020-05-19 PROCEDURE — 99207 ZZC PRENATAL VISIT: CPT | Performed by: OBSTETRICS & GYNECOLOGY

## 2020-05-19 RX ORDER — PRENATAL VIT/IRON FUM/FOLIC AC 27MG-0.8MG
1 TABLET ORAL DAILY
COMMUNITY
End: 2023-11-13

## 2020-05-19 NOTE — PATIENT INSTRUCTIONS
Patient Education     Comfort Tips During Pregnancy    Talk with your healthcare provider before using pain-relieving medicine at any time during your pregnancy.  First trimester tips  Nausea    Get up slowly. Eat a few unsalted crackers before you get out of bed.    Avoid smells that bother you.    Eat small bland low fat, light high-protein meals at frequent intervals.    Sip on water, weak tea, or clear soft drinks, like ginger ale. Eat ice chips.  Fatigue    Take catnaps when you can.    Get regular exercise.    Accept help from others.    Practice good sleep habits, like going to bed and getting up at the same time each day. Use your bed only for sleep and sex.  Mood swings    Talk about your feelings with others, including other mothers.    Limit sugar, chocolate, and caffeine.    Eat a healthy diet. Don t skip meals.    Get regular exercise.  Headaches    Get fresh air and exercise.    Relax and get enough rest.    Check with your healthcare provider before taking any pain medicines.  Second trimester tips  Here are some suggestions to help you cope:    To limit ankle swelling, sit with your feet raised or wear support hose.    If you have pain in your groin and stomach (round ligament pain), avoid sudden twisting movements.    For leg cramps, flexing your foot often brings immediate relief. You also may try massaging your calf in long, downward strokes, or stretching your legs before going to bed. Get enough exercise and wear shoes with flexible soles.  Third trimester tips  Reducing heartburn    Eat small, light meals throughout the day rather than 3 large ones.    Sleep with your upper body raised 6 inches. Don t lie down until 2 hours after you eat.    Don't eat greasy, fried, or spicy foods.    Avoid citrus fruits and juices.  Treating constipation    Eat foods high in fiber (whole-grain foods, fresh fruit and vegetables).    Drink plenty of water.    Get regular exercise.    Discuss other medicines  (like docusate and psyllium) with your healthcare provider.  Taking care of your breasts    Avoid using harsh soaps or alcohol, which can cause excessive dryness.    Wear nursing bras. They provide more support than regular bras and can be used after pregnancy if you breastfeed.  Getting a good night s sleep    Take a warm shower before bed.    Sleep on a firm mattress.    Lie on your side with 1 leg crossed over the other.    Use pillows to support arms, legs, and belly.  Date Last Reviewed: 10/1/2017    2879-2005 [employer]. [address] [place] [address]. All rights reserved. This information is not intended as a substitute for professional medical care. Always follow your healthcare professional's instructions.

## 2020-05-19 NOTE — PROGRESS NOTES
[facility]- OBGYN    CC: routine prenatal visit.    S:Arely Carranza is a 35 year old  at 33w6d by 7w1d us who presents today for routine prenatal visit.  Patient reports feeling more uncomfortable recently. BH ctxs.  Patient denies any vaginal bleeding, leakage of fluid.  She states she is feeling normal fetal movement.    Pregnancy notable for:  -h/o VAVD  -h/o SD  -failed GCT, passed GTT    O:   Patient Vitals for the past 24 hrs:   BP Pulse SpO2 Weight   20 1412 110/69 82 96 % 67.3 kg (148 lb 4.8 oz)   ]  Exam:  General- awake, alert, answering questions appropriately, appears comfortable  CV- regular rate  Lung- breathing comfortably on room air  Ext- bilateral lower extremities with trace edema    Doptones- 150 bpm  Fundal height-28 cm    A&P: Arely Carranza is a 35 year old  at 33w6d by 7w1d us who presents today for routine prenatal visit.     (Z34.93) Prenatal care in third trimester  (primary encounter diagnosis)  Comment: Doing well.  Patient with growth ultrasound  with EFW 91%tile.  Plan for repeat growth US in [facility] on .   Discussed eIOL at 39 weeks.  Patient would like this option.  Patient scheduled for eIOL on 2020 at 0800  Plan: Routine prenatal visit on  in person with GBS, OB Hgb.        Shruthi Keys MD

## 2020-06-02 ENCOUNTER — HOSPITAL ENCOUNTER (OUTPATIENT)
Dept: ULTRASOUND IMAGING | Facility: CLINIC | Age: 35
End: 2020-06-02
Attending: OBSTETRICS & GYNECOLOGY
Payer: COMMERCIAL

## 2020-06-02 ENCOUNTER — HOSPITAL ENCOUNTER (OUTPATIENT)
Facility: CLINIC | Age: 35
End: 2020-06-02
Admitting: OBSTETRICS & GYNECOLOGY
Payer: COMMERCIAL

## 2020-06-02 ENCOUNTER — OFFICE VISIT (OUTPATIENT)
Dept: MATERNAL FETAL MEDICINE | Facility: CLINIC | Age: 35
End: 2020-06-02
Attending: OBSTETRICS & GYNECOLOGY
Payer: COMMERCIAL

## 2020-06-02 ENCOUNTER — PRENATAL OFFICE VISIT (OUTPATIENT)
Dept: OBGYN | Facility: CLINIC | Age: 35
End: 2020-06-02
Payer: COMMERCIAL

## 2020-06-02 VITALS
HEART RATE: 67 BPM | WEIGHT: 146 LBS | TEMPERATURE: 98.5 F | DIASTOLIC BLOOD PRESSURE: 73 MMHG | BODY MASS INDEX: 23.21 KG/M2 | SYSTOLIC BLOOD PRESSURE: 108 MMHG

## 2020-06-02 DIAGNOSIS — Z87.59 HISTORY OF SHOULDER DYSTOCIA IN PRIOR PREGNANCY: Primary | ICD-10-CM

## 2020-06-02 DIAGNOSIS — O26.90 PREGNANCY RELATED CONDITION, ANTEPARTUM: ICD-10-CM

## 2020-06-02 DIAGNOSIS — Z34.93 PRENATAL CARE IN THIRD TRIMESTER: Primary | ICD-10-CM

## 2020-06-02 LAB
CAPILLARY BLOOD COLLECTION: NORMAL
HGB BLD-MCNC: 14.2 G/DL (ref 11.7–15.7)

## 2020-06-02 PROCEDURE — 76816 OB US FOLLOW-UP PER FETUS: CPT

## 2020-06-02 PROCEDURE — 87653 STREP B DNA AMP PROBE: CPT | Performed by: OBSTETRICS & GYNECOLOGY

## 2020-06-02 PROCEDURE — 99207 ZZC PRENATAL VISIT: CPT | Performed by: OBSTETRICS & GYNECOLOGY

## 2020-06-02 PROCEDURE — 00000218 ZZHCL STATISTIC OBHBG - HEMOGLOBIN: Performed by: OBSTETRICS & GYNECOLOGY

## 2020-06-02 PROCEDURE — 36416 COLLJ CAPILLARY BLOOD SPEC: CPT | Performed by: OBSTETRICS & GYNECOLOGY

## 2020-06-02 RX ORDER — BREAST PUMP
1 EACH MISCELLANEOUS ONCE
Qty: 1 EACH | Refills: 0 | Status: SHIPPED | OUTPATIENT
Start: 2020-06-02 | End: 2020-06-02

## 2020-06-02 NOTE — Clinical Note
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 She had a h/o shoulder dystocia at 41w with 8.5lb baby. Baby is [id number] today should be 7.5 at 39w.   SL

## 2020-06-02 NOTE — PATIENT INSTRUCTIONS
We recognize that this is a time of great change as you look forward to the birth of your baby. It's also a time of change for us, your healthcare providers.   We look forward to being part of your journey, even if it may be different than you expected. To best protect you and your family, we've made changes at our hospitals. As we learn more about this coronavirus (COVID-19), the points below may change.     Here is what you can expect:     We're limiting visitors to Labor & Delivery and Postpartum to just 1 adult visitor. This 1 adult may be your spouse, partner, family member, friend or . It's your choice who will be your 1 visitor. Your visitor can't have symptoms of COVID-19 (fever, cough, trouble breathing). We will take your visitor's temperature when you arrive and throughout your stay.     Your 1 visitor must stay with you in your room for your whole stay. If they leave the hospital, they can't come back in.     We ask that your visitor bring a mask from home to wear. If your visitor doesn't have a mask, the hospital will provide a mask.     You will have several food options. You and your visitor may order food through hospital room service. You may bring food with you from home when you begin your stay with us. Finally, you can have food delivered to the designated door at the hospital. You can check with hospital staff to find out where that is.     You and your visitor are not able to go outside to smoke.     Any time you come to the hospital, please have your belongings and your car seat in the car. If the hospital admits you for delivery, you can bring it all in for your stay. Please limit your luggage or bags and car seat to what you can carry in during 1 trip.     If you have a long stay in the hospital before your birth (antepartum hospitalization), we don't allow any visitors during that time. We encourage patients to work with their care teams on other options to connect and get support.      Our knowledge of COVID-19 is constantly growing. We'll keep you updated as things change. Thank you for having your baby at [facility]. We're honored to care for you and your family during this special event.

## 2020-06-02 NOTE — PROGRESS NOTES
35w6d  Active fetal movement. No leaking or bleeding. No contractions.  Bad varicose veins on right leg, wearing thigh high compression socks   US today: cephalic, EFW 2677g (5lb 14oz), 39%  Understands she has option for primary CS for h/o of shoulder dystocia. Prefers elective IOL at 39 weeks, scheduled 6/24 at 830am.   GBS collected today. Hgb 14.2.  Reviewed s/sx labor, kick counts, pre-E  Aware of COVID 19 visitor restrictions.  RTC weekly.  Venita Ragland MD

## 2020-06-03 LAB
GP B STREP DNA SPEC QL NAA+PROBE: NEGATIVE
SPECIMEN SOURCE: NORMAL

## 2020-06-12 ENCOUNTER — PRENATAL OFFICE VISIT (OUTPATIENT)
Dept: OBGYN | Facility: CLINIC | Age: 35
End: 2020-06-12
Payer: COMMERCIAL

## 2020-06-12 VITALS
TEMPERATURE: 98 F | DIASTOLIC BLOOD PRESSURE: 70 MMHG | WEIGHT: 149 LBS | BODY MASS INDEX: 23.69 KG/M2 | HEART RATE: 80 BPM | SYSTOLIC BLOOD PRESSURE: 108 MMHG

## 2020-06-12 DIAGNOSIS — Z34.93 PRENATAL CARE IN THIRD TRIMESTER: Primary | ICD-10-CM

## 2020-06-12 PROCEDURE — 99207 ZZC PRENATAL VISIT: CPT | Performed by: OBSTETRICS & GYNECOLOGY

## 2020-06-12 RX ORDER — IBUPROFEN 600 MG/1
600 TABLET, FILM COATED ORAL EVERY 6 HOURS PRN
Qty: 60 TABLET | Refills: 0 | Status: SHIPPED | OUTPATIENT
Start: 2020-06-12 | End: 2020-08-06

## 2020-06-12 RX ORDER — ACETAMINOPHEN 325 MG/1
650 TABLET ORAL EVERY 6 HOURS PRN
Qty: 100 TABLET | Refills: 0 | Status: SHIPPED | OUTPATIENT
Start: 2020-06-12 | End: 2020-08-06

## 2020-06-12 RX ORDER — AMOXICILLIN 250 MG
1 CAPSULE ORAL DAILY
Qty: 100 TABLET | Refills: 0 | Status: SHIPPED | OUTPATIENT
Start: 2020-06-12 | End: 2020-08-06

## 2020-06-12 NOTE — PROGRESS NOTES
37w2d  Doing well.  Fairly comfortable.  Baby active.  Started leave from work yesterday.  [place] the cabin this [place].  Size < dates, but had growth US 10 days ago with EFW 40%ile.  Has eIOL scheduled for 6/24.  Cervix 1.5cm already, so won't need cervical ripening.  Discharge meds done.  Got breast pump in the mail already.  RTC weekly until delivery.  Venita Shaffer MD

## 2020-06-16 ENCOUNTER — PRENATAL OFFICE VISIT (OUTPATIENT)
Dept: OBGYN | Facility: CLINIC | Age: 35
End: 2020-06-16
Payer: COMMERCIAL

## 2020-06-16 VITALS
BODY MASS INDEX: 23.69 KG/M2 | HEART RATE: 76 BPM | DIASTOLIC BLOOD PRESSURE: 73 MMHG | WEIGHT: 149 LBS | SYSTOLIC BLOOD PRESSURE: 120 MMHG | TEMPERATURE: 98.2 F

## 2020-06-16 DIAGNOSIS — Z11.59 SCREENING FOR VIRAL DISEASE: Primary | ICD-10-CM

## 2020-06-16 PROCEDURE — 59426 ANTEPARTUM CARE ONLY: CPT | Performed by: OBSTETRICS & GYNECOLOGY

## 2020-06-16 PROCEDURE — 99207 ZZC PRENATAL VISIT: CPT | Performed by: OBSTETRICS & GYNECOLOGY

## 2020-06-16 RX ORDER — FENTANYL CITRATE 50 UG/ML
50-100 INJECTION, SOLUTION INTRAMUSCULAR; INTRAVENOUS
Status: CANCELLED | OUTPATIENT
Start: 2020-06-16

## 2020-06-16 RX ORDER — LIDOCAINE 40 MG/G
CREAM TOPICAL
Status: CANCELLED | OUTPATIENT
Start: 2020-06-16

## 2020-06-16 RX ORDER — IBUPROFEN 200 MG
800 TABLET ORAL
Status: CANCELLED | OUTPATIENT
Start: 2020-06-16

## 2020-06-16 RX ORDER — METHYLERGONOVINE MALEATE 0.2 MG/ML
200 INJECTION INTRAVENOUS
Status: CANCELLED | OUTPATIENT
Start: 2020-06-16

## 2020-06-16 RX ORDER — OXYTOCIN 10 [USP'U]/ML
10 INJECTION, SOLUTION INTRAMUSCULAR; INTRAVENOUS
Status: CANCELLED | OUTPATIENT
Start: 2020-06-16

## 2020-06-16 RX ORDER — ACETAMINOPHEN 325 MG/1
650 TABLET ORAL EVERY 4 HOURS PRN
Status: CANCELLED | OUTPATIENT
Start: 2020-06-16

## 2020-06-16 RX ORDER — OXYTOCIN/0.9 % SODIUM CHLORIDE 30/500 ML
1-24 PLASTIC BAG, INJECTION (ML) INTRAVENOUS CONTINUOUS
Status: CANCELLED | OUTPATIENT
Start: 2020-06-16

## 2020-06-16 RX ORDER — ONDANSETRON 2 MG/ML
4 INJECTION INTRAMUSCULAR; INTRAVENOUS EVERY 6 HOURS PRN
Status: CANCELLED | OUTPATIENT
Start: 2020-06-16

## 2020-06-16 RX ORDER — TERBUTALINE SULFATE 1 MG/ML
0.25 INJECTION, SOLUTION SUBCUTANEOUS
Status: CANCELLED | OUTPATIENT
Start: 2020-06-16

## 2020-06-16 RX ORDER — OXYCODONE AND ACETAMINOPHEN 5; 325 MG/1; MG/1
1 TABLET ORAL
Status: CANCELLED | OUTPATIENT
Start: 2020-06-16

## 2020-06-16 RX ORDER — SODIUM CHLORIDE, SODIUM LACTATE, POTASSIUM CHLORIDE, CALCIUM CHLORIDE 600; 310; 30; 20 MG/100ML; MG/100ML; MG/100ML; MG/100ML
INJECTION, SOLUTION INTRAVENOUS CONTINUOUS
Status: CANCELLED | OUTPATIENT
Start: 2020-06-16

## 2020-06-16 RX ORDER — NALOXONE HYDROCHLORIDE 0.4 MG/ML
.1-.4 INJECTION, SOLUTION INTRAMUSCULAR; INTRAVENOUS; SUBCUTANEOUS
Status: CANCELLED | OUTPATIENT
Start: 2020-06-16

## 2020-06-16 RX ORDER — OXYTOCIN/0.9 % SODIUM CHLORIDE 30/500 ML
100-340 PLASTIC BAG, INJECTION (ML) INTRAVENOUS CONTINUOUS PRN
Status: CANCELLED | OUTPATIENT
Start: 2020-06-16

## 2020-06-16 RX ORDER — CARBOPROST TROMETHAMINE 250 UG/ML
250 INJECTION, SOLUTION INTRAMUSCULAR
Status: CANCELLED | OUTPATIENT
Start: 2020-06-16

## 2020-06-16 NOTE — PATIENT INSTRUCTIONS
We recognize that this is a time of great change as you look forward to the birth of your baby. It's also a time of change for us, your healthcare providers.   We look forward to being part of your journey, even if it may be different than you expected. To best protect you and your family, we've made changes at our hospitals. As we learn more about this coronavirus (COVID-19), the points below may change.     Here is what you can expect:     We're limiting visitors to Labor & Delivery and Postpartum to just 1 adult visitor. This 1 adult may be your spouse, partner, family member, friend or . It's your choice who will be your 1 visitor. Your visitor can't have symptoms of COVID-19 (fever, cough, trouble breathing). We will take your visitor's temperature when you arrive and throughout your stay.     Your 1 visitor must stay with you in your room for your whole stay. If they leave the hospital, they can't come back in.     We ask that your visitor bring a mask from home to wear. If your visitor doesn't have a mask, the hospital will provide a mask.     You will have several food options. You and your visitor may order food through hospital room service. You may bring food with you from home when you begin your stay with us. Finally, you can have food delivered to the designated door at the hospital. You can check with hospital staff to find out where that is.     You and your visitor are not able to go outside to smoke.     Any time you come to the hospital, please have your belongings and your car seat in the car. If the hospital admits you for delivery, you can bring it all in for your stay. Please limit your luggage or bags and car seat to what you can carry in during 1 trip.     If you have a long stay in the hospital before your birth (antepartum hospitalization), we don't allow any visitors during that time. We encourage patients to work with their care teams on other options to connect and get support.      Our knowledge of COVID-19 is constantly growing. We'll keep you updated as things change. Thank you for having your baby at [facility]. We're honored to care for you and your family during this special event.

## 2020-06-16 NOTE — Clinical Note
Arely Carranza is scheduled for IOL next week 6/24 and has not yet been scheduled for a COVID 19 test.   Thanks, Venita Ragland MD

## 2020-06-16 NOTE — PROGRESS NOTES
37w6d  Active fetal movement. Occasional contractions. No leaking, bleeding or abnormal discharge.   IOL sched 6/24.     GBS: Negative  Hemoglobin   Date Value Ref Range Status   06/02/2020 14.2 11.7 - 15.7 g/dL Final     Breast pump rx: Done  Labor orders: done  Birth plan: prefers no wyatt bulb. No VAVD bc h/o shoulder dystocia. Got epidural before AROM last time, would like to do that again  Length of stay: discussed  Disability paperwork: completed  Resident involvement: discussed and agrees.  OTC postpartum meds: done     Reviewed labor instructions, kick counts, s/sx pre-eclampsia.  RTC weekly.  Venita Ragland MD

## 2020-06-21 DIAGNOSIS — Z11.59 SCREENING FOR VIRAL DISEASE: ICD-10-CM

## 2020-06-21 LAB
SARS-COV-2 RNA SPEC QL NAA+PROBE: NOT DETECTED
SPECIMEN SOURCE: NORMAL

## 2020-06-24 ENCOUNTER — ANESTHESIA EVENT (OUTPATIENT)
Dept: OBGYN | Facility: CLINIC | Age: 35
End: 2020-06-24
Payer: COMMERCIAL

## 2020-06-24 ENCOUNTER — HOSPITAL ENCOUNTER (INPATIENT)
Facility: CLINIC | Age: 35
LOS: 2 days | Discharge: HOME HEALTH | End: 2020-06-26
Attending: OBSTETRICS & GYNECOLOGY | Admitting: OBSTETRICS & GYNECOLOGY
Payer: COMMERCIAL

## 2020-06-24 ENCOUNTER — ANESTHESIA (OUTPATIENT)
Dept: OBGYN | Facility: CLINIC | Age: 35
End: 2020-06-24
Payer: COMMERCIAL

## 2020-06-24 DIAGNOSIS — Z67.91 RH NEGATIVE STATE IN ANTEPARTUM PERIOD: Primary | ICD-10-CM

## 2020-06-24 DIAGNOSIS — O26.899 RH NEGATIVE STATE IN ANTEPARTUM PERIOD: Primary | ICD-10-CM

## 2020-06-24 LAB
ABO + RH BLD: ABNORMAL
ABO + RH BLD: ABNORMAL
BLD GP AB INVEST PLASRBC-IMP: ABNORMAL
BLD GP AB SCN SERPL QL: ABNORMAL
BLOOD BANK CMNT PATIENT-IMP: ABNORMAL
BLOOD BANK CMNT PATIENT-IMP: ABNORMAL
BLOOD BANK CMNT PATIENT-IMP: NORMAL
HGB BLD-MCNC: 13.9 G/DL (ref 11.7–15.7)
PLATELET # BLD AUTO: 90 10E9/L (ref 150–450)
PLATELET # BLD AUTO: 95 10E9/L (ref 150–450)
SPECIMEN EXP DATE BLD: ABNORMAL

## 2020-06-24 PROCEDURE — 72200001 ZZH LABOR CARE VAGINAL DELIVERY SINGLE

## 2020-06-24 PROCEDURE — 36415 COLL VENOUS BLD VENIPUNCTURE: CPT | Performed by: OBSTETRICS & GYNECOLOGY

## 2020-06-24 PROCEDURE — 12000001 ZZH R&B MED SURG/OB UMMC

## 2020-06-24 PROCEDURE — 25000128 H RX IP 250 OP 636: Performed by: ANESTHESIOLOGY

## 2020-06-24 PROCEDURE — 25800030 ZZH RX IP 258 OP 636: Performed by: ANESTHESIOLOGY

## 2020-06-24 PROCEDURE — 10907ZC DRAINAGE OF AMNIOTIC FLUID, THERAPEUTIC FROM PRODUCTS OF CONCEPTION, VIA NATURAL OR ARTIFICIAL OPENING: ICD-10-PCS | Performed by: OBSTETRICS & GYNECOLOGY

## 2020-06-24 PROCEDURE — 25000125 ZZHC RX 250: Performed by: OBSTETRICS & GYNECOLOGY

## 2020-06-24 PROCEDURE — 86780 TREPONEMA PALLIDUM: CPT | Performed by: OBSTETRICS & GYNECOLOGY

## 2020-06-24 PROCEDURE — 85018 HEMOGLOBIN: CPT | Performed by: OBSTETRICS & GYNECOLOGY

## 2020-06-24 PROCEDURE — 25000125 ZZHC RX 250: Performed by: ANESTHESIOLOGY

## 2020-06-24 PROCEDURE — 85049 AUTOMATED PLATELET COUNT: CPT | Performed by: OBSTETRICS & GYNECOLOGY

## 2020-06-24 PROCEDURE — 00HU33Z INSERTION OF INFUSION DEVICE INTO SPINAL CANAL, PERCUTANEOUS APPROACH: ICD-10-PCS | Performed by: ANESTHESIOLOGY

## 2020-06-24 PROCEDURE — 86900 BLOOD TYPING SEROLOGIC ABO: CPT | Performed by: OBSTETRICS & GYNECOLOGY

## 2020-06-24 PROCEDURE — 86901 BLOOD TYPING SEROLOGIC RH(D): CPT | Performed by: OBSTETRICS & GYNECOLOGY

## 2020-06-24 PROCEDURE — 86870 RBC ANTIBODY IDENTIFICATION: CPT | Performed by: OBSTETRICS & GYNECOLOGY

## 2020-06-24 PROCEDURE — 25800030 ZZH RX IP 258 OP 636: Performed by: OBSTETRICS & GYNECOLOGY

## 2020-06-24 PROCEDURE — 0KQM0ZZ REPAIR PERINEUM MUSCLE, OPEN APPROACH: ICD-10-PCS | Performed by: OBSTETRICS & GYNECOLOGY

## 2020-06-24 PROCEDURE — 25000132 ZZH RX MED GY IP 250 OP 250 PS 637: Performed by: OBSTETRICS & GYNECOLOGY

## 2020-06-24 PROCEDURE — 59410 OBSTETRICAL CARE: CPT | Performed by: OBSTETRICS & GYNECOLOGY

## 2020-06-24 PROCEDURE — 86850 RBC ANTIBODY SCREEN: CPT | Performed by: OBSTETRICS & GYNECOLOGY

## 2020-06-24 PROCEDURE — 3E0R3BZ INTRODUCTION OF ANESTHETIC AGENT INTO SPINAL CANAL, PERCUTANEOUS APPROACH: ICD-10-PCS | Performed by: ANESTHESIOLOGY

## 2020-06-24 PROCEDURE — 25800030 ZZH RX IP 258 OP 636

## 2020-06-24 RX ORDER — OXYTOCIN/0.9 % SODIUM CHLORIDE 30/500 ML
340 PLASTIC BAG, INJECTION (ML) INTRAVENOUS CONTINUOUS PRN
Status: DISCONTINUED | OUTPATIENT
Start: 2020-06-24 | End: 2020-06-26 | Stop reason: HOSPADM

## 2020-06-24 RX ORDER — AMOXICILLIN 250 MG
2 CAPSULE ORAL 2 TIMES DAILY
Status: DISCONTINUED | OUTPATIENT
Start: 2020-06-24 | End: 2020-06-26 | Stop reason: HOSPADM

## 2020-06-24 RX ORDER — OXYTOCIN 10 [USP'U]/ML
INJECTION, SOLUTION INTRAMUSCULAR; INTRAVENOUS
Status: DISCONTINUED
Start: 2020-06-24 | End: 2020-06-24 | Stop reason: WASHOUT

## 2020-06-24 RX ORDER — ACETAMINOPHEN 325 MG/1
650 TABLET ORAL EVERY 4 HOURS PRN
Status: DISCONTINUED | OUTPATIENT
Start: 2020-06-24 | End: 2020-06-24

## 2020-06-24 RX ORDER — AMOXICILLIN 250 MG
1 CAPSULE ORAL 2 TIMES DAILY
Status: DISCONTINUED | OUTPATIENT
Start: 2020-06-24 | End: 2020-06-26 | Stop reason: HOSPADM

## 2020-06-24 RX ORDER — TERBUTALINE SULFATE 1 MG/ML
0.25 INJECTION, SOLUTION SUBCUTANEOUS
Status: DISCONTINUED | OUTPATIENT
Start: 2020-06-24 | End: 2020-06-24

## 2020-06-24 RX ORDER — METHYLERGONOVINE MALEATE 0.2 MG/ML
200 INJECTION INTRAVENOUS
Status: DISCONTINUED | OUTPATIENT
Start: 2020-06-24 | End: 2020-06-26 | Stop reason: HOSPADM

## 2020-06-24 RX ORDER — MISOPROSTOL 200 UG/1
TABLET ORAL
Status: DISCONTINUED
Start: 2020-06-24 | End: 2020-06-24 | Stop reason: WASHOUT

## 2020-06-24 RX ORDER — CARBOPROST TROMETHAMINE 250 UG/ML
250 INJECTION, SOLUTION INTRAMUSCULAR
Status: DISCONTINUED | OUTPATIENT
Start: 2020-06-24 | End: 2020-06-26 | Stop reason: HOSPADM

## 2020-06-24 RX ORDER — OXYTOCIN/0.9 % SODIUM CHLORIDE 30/500 ML
1-24 PLASTIC BAG, INJECTION (ML) INTRAVENOUS CONTINUOUS
Status: DISCONTINUED | OUTPATIENT
Start: 2020-06-24 | End: 2020-06-24

## 2020-06-24 RX ORDER — NALOXONE HYDROCHLORIDE 0.4 MG/ML
.1-.4 INJECTION, SOLUTION INTRAMUSCULAR; INTRAVENOUS; SUBCUTANEOUS
Status: DISCONTINUED | OUTPATIENT
Start: 2020-06-24 | End: 2020-06-26 | Stop reason: HOSPADM

## 2020-06-24 RX ORDER — MISOPROSTOL 200 UG/1
800 TABLET ORAL
Status: DISCONTINUED | OUTPATIENT
Start: 2020-06-24 | End: 2020-06-26 | Stop reason: HOSPADM

## 2020-06-24 RX ORDER — METHYLERGONOVINE MALEATE 0.2 MG/ML
200 INJECTION INTRAVENOUS
Status: DISCONTINUED | OUTPATIENT
Start: 2020-06-24 | End: 2020-06-24

## 2020-06-24 RX ORDER — OXYTOCIN/0.9 % SODIUM CHLORIDE 30/500 ML
PLASTIC BAG, INJECTION (ML) INTRAVENOUS
Status: DISCONTINUED
Start: 2020-06-24 | End: 2020-06-24 | Stop reason: WASHOUT

## 2020-06-24 RX ORDER — LIDOCAINE 40 MG/G
CREAM TOPICAL
Status: DISCONTINUED | OUTPATIENT
Start: 2020-06-24 | End: 2020-06-24

## 2020-06-24 RX ORDER — OXYTOCIN 10 [USP'U]/ML
10 INJECTION, SOLUTION INTRAMUSCULAR; INTRAVENOUS
Status: DISCONTINUED | OUTPATIENT
Start: 2020-06-24 | End: 2020-06-26 | Stop reason: HOSPADM

## 2020-06-24 RX ORDER — NALBUPHINE HYDROCHLORIDE 20 MG/ML
2.5-5 INJECTION, SOLUTION INTRAMUSCULAR; INTRAVENOUS; SUBCUTANEOUS EVERY 6 HOURS PRN
Status: DISCONTINUED | OUTPATIENT
Start: 2020-06-24 | End: 2020-06-24

## 2020-06-24 RX ORDER — EPHEDRINE SULFATE 50 MG/ML
5 INJECTION, SOLUTION INTRAMUSCULAR; INTRAVENOUS; SUBCUTANEOUS
Status: DISCONTINUED | OUTPATIENT
Start: 2020-06-24 | End: 2020-06-24

## 2020-06-24 RX ORDER — LIDOCAINE HYDROCHLORIDE 10 MG/ML
INJECTION, SOLUTION EPIDURAL; INFILTRATION; INTRACAUDAL; PERINEURAL
Status: DISCONTINUED
Start: 2020-06-24 | End: 2020-06-24 | Stop reason: WASHOUT

## 2020-06-24 RX ORDER — SODIUM CHLORIDE, SODIUM LACTATE, POTASSIUM CHLORIDE, CALCIUM CHLORIDE 600; 310; 30; 20 MG/100ML; MG/100ML; MG/100ML; MG/100ML
INJECTION, SOLUTION INTRAVENOUS
Status: COMPLETED
Start: 2020-06-24 | End: 2020-06-24

## 2020-06-24 RX ORDER — NALOXONE HYDROCHLORIDE 0.4 MG/ML
.1-.4 INJECTION, SOLUTION INTRAMUSCULAR; INTRAVENOUS; SUBCUTANEOUS
Status: DISCONTINUED | OUTPATIENT
Start: 2020-06-24 | End: 2020-06-24

## 2020-06-24 RX ORDER — OXYTOCIN/0.9 % SODIUM CHLORIDE 30/500 ML
100-340 PLASTIC BAG, INJECTION (ML) INTRAVENOUS CONTINUOUS PRN
Status: DISCONTINUED | OUTPATIENT
Start: 2020-06-24 | End: 2020-06-24

## 2020-06-24 RX ORDER — HYDROCORTISONE 2.5 %
CREAM (GRAM) TOPICAL 3 TIMES DAILY PRN
Status: DISCONTINUED | OUTPATIENT
Start: 2020-06-24 | End: 2020-06-26 | Stop reason: HOSPADM

## 2020-06-24 RX ORDER — ALBUTEROL SULFATE 90 UG/1
2 AEROSOL, METERED RESPIRATORY (INHALATION) EVERY 6 HOURS PRN
Status: DISCONTINUED | OUTPATIENT
Start: 2020-06-24 | End: 2020-06-26 | Stop reason: HOSPADM

## 2020-06-24 RX ORDER — IBUPROFEN 800 MG/1
800 TABLET, FILM COATED ORAL EVERY 6 HOURS PRN
Status: DISCONTINUED | OUTPATIENT
Start: 2020-06-24 | End: 2020-06-26 | Stop reason: HOSPADM

## 2020-06-24 RX ORDER — SODIUM CHLORIDE, SODIUM LACTATE, POTASSIUM CHLORIDE, CALCIUM CHLORIDE 600; 310; 30; 20 MG/100ML; MG/100ML; MG/100ML; MG/100ML
INJECTION, SOLUTION INTRAVENOUS CONTINUOUS
Status: DISCONTINUED | OUTPATIENT
Start: 2020-06-24 | End: 2020-06-24

## 2020-06-24 RX ORDER — OXYTOCIN 10 [USP'U]/ML
10 INJECTION, SOLUTION INTRAMUSCULAR; INTRAVENOUS
Status: DISCONTINUED | OUTPATIENT
Start: 2020-06-24 | End: 2020-06-24

## 2020-06-24 RX ORDER — LIDOCAINE HYDROCHLORIDE AND EPINEPHRINE 15; 5 MG/ML; UG/ML
INJECTION, SOLUTION EPIDURAL PRN
Status: DISCONTINUED | OUTPATIENT
Start: 2020-06-24 | End: 2023-11-13

## 2020-06-24 RX ORDER — MODIFIED LANOLIN
OINTMENT (GRAM) TOPICAL
Status: DISCONTINUED | OUTPATIENT
Start: 2020-06-24 | End: 2020-06-26 | Stop reason: HOSPADM

## 2020-06-24 RX ORDER — OXYCODONE AND ACETAMINOPHEN 5; 325 MG/1; MG/1
1 TABLET ORAL
Status: DISCONTINUED | OUTPATIENT
Start: 2020-06-24 | End: 2020-06-24

## 2020-06-24 RX ORDER — FENTANYL CITRATE 50 UG/ML
50-100 INJECTION, SOLUTION INTRAMUSCULAR; INTRAVENOUS
Status: DISCONTINUED | OUTPATIENT
Start: 2020-06-24 | End: 2020-06-24

## 2020-06-24 RX ORDER — IBUPROFEN 800 MG/1
800 TABLET, FILM COATED ORAL
Status: DISCONTINUED | OUTPATIENT
Start: 2020-06-24 | End: 2020-06-24

## 2020-06-24 RX ORDER — OXYTOCIN/0.9 % SODIUM CHLORIDE 30/500 ML
100 PLASTIC BAG, INJECTION (ML) INTRAVENOUS CONTINUOUS
Status: DISCONTINUED | OUTPATIENT
Start: 2020-06-24 | End: 2020-06-26 | Stop reason: HOSPADM

## 2020-06-24 RX ORDER — CARBOPROST TROMETHAMINE 250 UG/ML
250 INJECTION, SOLUTION INTRAMUSCULAR
Status: DISCONTINUED | OUTPATIENT
Start: 2020-06-24 | End: 2020-06-24

## 2020-06-24 RX ORDER — BISACODYL 10 MG
10 SUPPOSITORY, RECTAL RECTAL DAILY PRN
Status: DISCONTINUED | OUTPATIENT
Start: 2020-06-26 | End: 2020-06-26 | Stop reason: HOSPADM

## 2020-06-24 RX ORDER — ACETAMINOPHEN 325 MG/1
650 TABLET ORAL EVERY 4 HOURS PRN
Status: DISCONTINUED | OUTPATIENT
Start: 2020-06-24 | End: 2020-06-26 | Stop reason: HOSPADM

## 2020-06-24 RX ORDER — ONDANSETRON 2 MG/ML
4 INJECTION INTRAMUSCULAR; INTRAVENOUS EVERY 6 HOURS PRN
Status: DISCONTINUED | OUTPATIENT
Start: 2020-06-24 | End: 2020-06-24

## 2020-06-24 RX ORDER — FENTANYL/BUPIVACAINE/NS/PF 2-1250MCG
PLASTIC BAG, INJECTION (ML) INJECTION CONTINUOUS PRN
Status: DISCONTINUED | OUTPATIENT
Start: 2020-06-24 | End: 2023-11-13

## 2020-06-24 RX ADMIN — SODIUM CHLORIDE, POTASSIUM CHLORIDE, SODIUM LACTATE AND CALCIUM CHLORIDE: 600; 310; 30; 20 INJECTION, SOLUTION INTRAVENOUS at 09:53

## 2020-06-24 RX ADMIN — BUPIVACAINE HYDROCHLORIDE: 7.5 INJECTION, SOLUTION EPIDURAL; RETROBULBAR at 15:16

## 2020-06-24 RX ADMIN — Medication 8 ML: at 14:59

## 2020-06-24 RX ADMIN — ACETAMINOPHEN 650 MG: 325 TABLET, FILM COATED ORAL at 19:14

## 2020-06-24 RX ADMIN — Medication 2 MILLI-UNITS/MIN: at 10:05

## 2020-06-24 RX ADMIN — LIDOCAINE HYDROCHLORIDE,EPINEPHRINE BITARTRATE 3 ML: 15; .005 INJECTION, SOLUTION EPIDURAL; INFILTRATION; INTRACAUDAL; PERINEURAL at 14:56

## 2020-06-24 RX ADMIN — SODIUM CHLORIDE, POTASSIUM CHLORIDE, SODIUM LACTATE AND CALCIUM CHLORIDE: 600; 310; 30; 20 INJECTION, SOLUTION INTRAVENOUS at 14:43

## 2020-06-24 RX ADMIN — IBUPROFEN 800 MG: 800 TABLET ORAL at 21:35

## 2020-06-24 RX ADMIN — Medication 5 ML/HR: at 15:01

## 2020-06-24 ASSESSMENT — MIFFLIN-ST. JEOR: SCORE: 1406.22

## 2020-06-24 NOTE — PLAN OF CARE
Induction of Labor Admit Note  Arely Carranza  MRN: [id number]  Gestational Age: 39w0d      Arely Carranza presents for elective induction of labor.  Patient denies contractions, bleeding or ROM.    Past Medical History:   Diagnosis Date    Asthma      OB Induction Plan: Completed and available in epic chart.    Dr Mason notified of patient's arrival and condition.   Oriented patient to surroundings. Call light within reach.     Plan:   - pitocin  -epidural upon pt request

## 2020-06-24 NOTE — ANESTHESIA PROCEDURE NOTES
Epidural Procedure Note  Staff -   Anesthesiologist:  Maycol Greene MD      Performed By: anesthesiologist          Location: OB     Procedure start time:  6/24/2020 2:50 PM     Procedure end time:  6/24/2020 2:57 PM   Pre-procedure checklist:   patient identified, IV checked, site marked, risks and benefits discussed, informed consent, monitors and equipment checked, pre-op evaluation, at physician/surgeon's request and post-op pain management      Correct Patient: Yes      Correct Position: Yes      Correct Site: Yes      Correct Procedure: Yes      Correct Laterality:  Yes    Site Marked:  Yes  Procedure:     Procedure:  Epidural catheter    ASA:  1    Diagnosis:  Labor Pain    Position:  Sitting    Sterile Prep: chloraprep      Insertion site:  L3-4    Local skin infiltration:  1% lidocaine    amount (mL):  3    Approach:  Midline    Needle gauge (G):  17    Needle Length (in):  3.5    Block Needle Type:  Touhzack    Injection Technique:  LORT saline    PAU at (cm):  4    Attempts:  1    Redirects:  0    Catheter gauge (G):  19    Threaded to cm at skin:  8    Threaded in epidural space (cm):  4    Paresthesias:  No    Aspiration negative for Heme or CSF: Yes      Test dose (mL):  3     Local anesthetic:  Lidocaine 1.5% w/ 1:200,000 epinephrine    Test dose time:  14:57    Test dose negative for signs of intravascular, subdural or intrathecal injection: Yes

## 2020-06-24 NOTE — H&P
[facility] [place]    History and Physical  Obstetrics and Gynecology     Date of Admission:  2020    Assessment & Plan   Arely Carranza is a 35 year old female who presents with favorable cervix at term    ASSESSMENT:   IUP @ 39w0d for induction of labor.  Indication favorable cervix, term.  NST reactive.  Category  I    PLAN:   Admit - see IP orders  Labor induction with Pitocin  Pain medication epidural, arom when good pattern  Anticipate      WILDA MONTEMAYOR    History of Present Illness   Arely Carranza is a 35 year old female  39w0d  Estimated Date of Delivery: 2020 is calculated from Patient's last menstrual period was 2019. is admitted to the [facility]  for induction of labor.  Indication favorable cervix at term.  No LOF, VB, good FM.    PRENATAL COURSE  Prenatal course was essentially uncomplicated      Recent Labs   Lab Test 20  0930  10/31/19  1325   ABO PENDING  --  A   RH  --   --  Neg   AS PENDING   < > Neg    < > = values in this interval not displayed.     Rhogam indicated and given on 20   Recent Labs   Lab Test 20  1032 10/31/19  1326   HEPBANG  --  Nonreactive   HIAGAB  --  Nonreactive   GBS Negative  --    RUQIGG  --  25         Prior to Admission Medications   Prior to Admission Medications   Prescriptions Last Dose Informant Patient Reported? Taking?   Misc. Devices (BREAST PUMP) MISC   No No   Si each once for 1 dose   Prenatal Vit-Fe Fumarate-FA (PRENATAL MULTIVITAMIN W/IRON) 27-0.8 MG tablet 2020 at Unknown time  Yes Yes   Sig: Take 1 tablet by mouth daily   acetaminophen (TYLENOL) 325 MG tablet   No No   Sig: Take 2 tablets (650 mg) by mouth every 6 hours as needed for mild pain Start after Delivery.   albuterol (PROAIR HFA/PROVENTIL HFA/VENTOLIN HFA) 108 (90 Base) MCG/ACT inhaler More than a month at Unknown time  No No   Sig: Inhale 2 puffs into the lungs every 6 hours as needed for  shortness of breath / dyspnea or wheezing   ibuprofen (ADVIL/MOTRIN) 600 MG tablet   No No   Sig: Take 1 tablet (600 mg) by mouth every 6 hours as needed for moderate pain Start after delivery   loratadine (CLARITIN) 10 MG tablet 6/23/2020 at Unknown time  Yes Yes   Sig: Take 10 mg by mouth daily   ondansetron (ZOFRAN) 4 MG tablet More than a month at Unknown time  No No   Sig: Take 1 tablet (4 mg) by mouth every 8 hours as needed for nausea or vomiting   senna-docusate (SENOKOT-S/PERICOLACE) 8.6-50 MG tablet   No No   Sig: Take 1 tablet by mouth daily Start after delivery.      Facility-Administered Medications: None     Allergies   Allergies   Allergen Reactions     Penicillins      Sulfa Drugs          Immunization History   Immunization History   Administered Date(s) Administered     Influenza Vaccine IM > 6 months Valent IIV4 10/18/2018, 09/24/2019     TDAP Vaccine (Adacel) 04/06/2020       Physical Exam   Temp: 98.1  F (36.7  C) Temp src: Oral BP: 130/79 Pulse: 72            Vital Signs with Ranges  Temp:  [98.1  F (36.7  C)] 98.1  F (36.7  C)  Pulse:  [72] 72  BP: (130)/(79) 130/79    Abdomen: gravid, single vertex fetus, non-tender, EFW 7 lbs   Cervical Exam: 3/ 50/ Mid/ soft/ -1     Fetal Heart Tones: 120 baseline, moderate variablility, + accels, no decels and Category I  TOCO:   external monitor and rare    Constitutional: healthy, alert, active and no distress   Extremities: NT, no edema  Neurologic: Awake, alert, oriented x3  Neuropsychiatric: General: normal, calm and normal eye contact    WILDA MONTEMAYOR MD

## 2020-06-24 NOTE — PROGRESS NOTES
"[facility]  Labor Progress Note    S:  Patient feeling well, would like to proceed with AROM to help get into more active labor. Now comfortable with epidural, appreciating contractions still \"If I think of them\".     O:   Patient Vitals for the past 4 hrs:   BP Temp Temp src Resp SpO2   20 1512 114/65 -- -- -- --   20 1507 118/67 -- -- -- --   20 1505 115/68 -- -- 18 98 %   20 1503 115/68 -- -- 18 --   20 1501 117/69 -- -- -- (P) 98 %   20 1459 116/73 -- -- -- --   20 1457 123/79 -- -- -- --   20 1455 136/77 -- -- -- --   20 1453 130/87 -- -- -- 100 %   20 1448 -- -- -- -- 100 %   20 1443 -- -- -- -- 100 %   20 1311 117/60 98.6  F (37  C) Oral -- --     SVE: 3/50/-1 last at 1400 and 1531 by staff MD - AROM performed for clear fluid    FHT: Baseline 120s-130, moderate variability, accelerations present, decelerations absent   [place]: 4-5 contractions in 10 minutes    A/P:  Ms. Arely Carranza is a 35 year old  at 39w0d by 7w1d US, here for an elective IOL. Pregnancy complicated by Hx VAVD and shoulder dystocia, Failed GCT (188) passed GTT, Rh negative. Patient is an Anesthesiologist here,. 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     #-PNC   -Rh positive, Rubella immune, GCT (188); passed GTT. No intervention indicated.  -GBS negative, abx not indicated   -Placenta: anterior      #Mild intermittent Asthma   -Listed in chart, patient reports she has mild intermittent asthma   -Reports she does use with URIs, allergy flare ups - states she does not need it ordered here.   -Avoid hemabate in case of PPH     #Hx of VAVD and shoulder dystocia   -2016 at [place] per delivery note: Baby at +3, slow but continued progress, maternal exhaustion and recurrence of deep prolonged decelerations. That baby was 8lb6oz at 40w6d  -Last  US @ 35+6: 39%ile, EFW 2677g ([id number]), HC > AC  -Previous US on  @ " 33+1: EFW 2043g which was in the 91st%ile, -HC>AC  -Failed GCT (188), passed GTT  -Patient counseled on low but very real risk of recurrent shoulder dystocia, offered primary CS, prefers to try IOL    Labor: - Currently on pitocin at 8mu/min. Titrate per protocol S/p AROM, clear @ 1531  FWB: - Category 1 FHT  PNC: - Rh pos, Rubella immune, GBS neg, Placenta anterior     Eli Mcallister MD  Ob/Gyn, PGY-2  6/24/2020, 4:00 PM

## 2020-06-24 NOTE — PROGRESS NOTES
"Subjective:   Contractions: feeling them  Leakage of fluids: no        Objective:  Patient Vitals for the past 8 hrs:   BP Temp Temp src Pulse Resp Height Weight   20 1311 117/60 98.6  F (37  C) Oral -- -- -- --   20 1105 121/71 98.3  F (36.8  C) Oral -- 18 -- --   20 0906 130/79 98.1  F (36.7  C) Oral 72 -- 1.676 m (5' 6\") 68.9 kg (152 lb)     Cervix: unchanged 3/50/-1  Membranes: intact    EFM:   120 baseline, moderate variablility, + accels, no decels, Category I  Tocometer: external monitor, frequency q 2-4 minutes and Pitocin @ 8 mU    Assessment:/Plan:   Labor: induced with Pitocin  Plans epidural and then will AROM. GBS neg. Anticipate     WILDA MONTEMAYOR MD    "

## 2020-06-24 NOTE — PROGRESS NOTES
"Subjective:   Contractions: very comfortable now  Leakage of fluids: no        Objective:  Patient Vitals for the past 8 hrs:   BP Temp Temp src Pulse Resp SpO2 Height Weight   06/24/20 1530 120/75 -- -- -- -- 99 % -- --   06/24/20 1525 108/68 98.1  F (36.7  C) Oral -- 18 98 % -- --   06/24/20 1520 121/70 -- -- -- -- 99 % -- --   06/24/20 1512 114/65 -- -- -- -- -- -- --   06/24/20 1507 118/67 -- -- -- -- -- -- --   06/24/20 1505 115/68 -- -- -- 18 98 % -- --   06/24/20 1503 115/68 -- -- -- 18 -- -- --   06/24/20 1501 117/69 -- -- -- -- 98 % -- --   06/24/20 1459 116/73 -- -- -- -- -- -- --   06/24/20 1457 123/79 -- -- -- -- -- -- --   06/24/20 1455 136/77 -- -- -- -- -- -- --   06/24/20 1453 130/87 -- -- -- -- 100 % -- --   06/24/20 1448 -- -- -- -- -- 100 % -- --   06/24/20 1443 -- -- -- -- -- 100 % -- --   06/24/20 1311 117/60 98.6  F (37  C) Oral -- -- -- -- --   06/24/20 1105 121/71 98.3  F (36.8  C) Oral -- 18 -- -- --   06/24/20 0906 130/79 98.1  F (36.7  C) Oral 72 -- -- 1.676 m (5' 6\") 68.9 kg (152 lb)     Cervix: 3/50/-2 unchanged  Membranes: AROM  clear amniotic fluid    EFM:   120 baseline, moderate variablility, + accels, no decels, Category I  Tocometer: external monitor, frequency q 2-4 minutes and Pitocin @ 8 mU    Assessment:/Plan:   Labor: induced with Pitocin  Comfortable with epidural. Will recheck in a few hours.     WILDA MONTEMAYOR MD    "

## 2020-06-24 NOTE — ANESTHESIA PREPROCEDURE EVALUATION
"Anesthesia Pre-Procedure Evaluation    Patient: Arely Carranza   MRN:     [id number] Gender:   female   Age:    35 year old :      1985        Preoperative Diagnosis: * No surgery found *        LABS:  CBC:   Lab Results   Component Value Date    WBC 12.3 (H) 2020    WBC 5.8 2019    HGB 13.9 2020    HGB 14.2 2020    HCT 37.5 2020    HCT 40.7 2019    PLT 90 (L) 2020     (L) 2020     BMP: No results found for: NA, POTASSIUM, CHLORIDE, CO2, BUN, CR, GLC  COAGS: No results found for: PTT, INR, FIBR  POC:   Lab Results   Component Value Date    HCG Positive (A) 10/31/2019     OTHER:   Lab Results   Component Value Date    TSH 2.21 2019        Preop Vitals    BP Readings from Last 3 Encounters:   20 121/71   20 120/73   20 108/70    Pulse Readings from Last 3 Encounters:   20 72   20 76   20 80      Resp Readings from Last 3 Encounters:   No data found for Resp    SpO2 Readings from Last 3 Encounters:   20 96%   20 97%   20 98%      Temp Readings from Last 1 Encounters:   20 36.7  C (98.1  F) (Oral)    Ht Readings from Last 1 Encounters:   20 1.676 m (5' 6\")      Wt Readings from Last 1 Encounters:   20 68.9 kg (152 lb)    Estimated body mass index is 24.53 kg/m  as calculated from the following:    Height as of this encounter: 1.676 m (5' 6\").    Weight as of this encounter: 68.9 kg (152 lb).     LDA:  Peripheral IV 20 Left Lower forearm (Active)   Site Assessment WDL 20 1205   Line Status Infusing 20 1205   Phlebitis Scale 0-->no symptoms 20 1205   Infiltration Scale 0 20 1205   Number of days: 0        Past Medical History:   Diagnosis Date     Asthma       Past Surgical History:   Procedure Laterality Date     FOOT SURGERY      cyst removal     wisdom tooth removal        Allergies   Allergen Reactions     Penicillins      Sulfa Drugs     "     Anesthesia Evaluation       history and physical reviewed .             ROS/MED HX    ENT/Pulmonary:     (+)Intermittent asthma , . .    Neurologic:  - neg neurologic ROS     Cardiovascular:  - neg cardiovascular ROS       METS/Exercise Tolerance:  >4 METS   Hematologic:  - neg hematologic  ROS       Musculoskeletal:  - neg musculoskeletal ROS       GI/Hepatic:  - neg GI/hepatic ROS       Renal/Genitourinary:  - ROS Renal section negative       Endo:  - neg endo ROS       Psychiatric:  - neg psychiatric ROS       Infectious Disease:  - neg infectious disease ROS       Malignancy:      - no malignancy   Other:                         PHYSICAL EXAM:   Mental Status/Neuro: A/A/O   Airway: Facies: Feasible  Mallampati: I  Mouth/Opening: Full  TM distance: > 6 cm  Neck ROM: Full   Respiratory: Auscultation: CTAB     Resp. Rate: Normal     Resp. Effort: Normal      CV: Rhythm: Regular  Rate: Age appropriate  Heart: Normal Sounds  Edema: None   Comments:      Dental: Normal Dentition                Assessment:              PONV Management: Adult Risk Factors: Female             neg OB ROS             Maycol Greene MD

## 2020-06-24 NOTE — PLAN OF CARE
of viable Male with Dr. Mason in attendance. Nursery RN Ira PALOMO. present. Infant with spontaneous cry to mothers abdomen, dried and stimulated. Apgars 9/9. Placenta delivered without complications. 2nd degree laceration with repair. Sherrill cares provided and ice to perineum. Mother and baby in stable condition.

## 2020-06-25 LAB
BLOOD BANK CMNT PATIENT-IMP: NORMAL
HGB BLD-MCNC: 12.7 G/DL (ref 11.7–15.7)
T PALLIDUM AB SER QL: NONREACTIVE

## 2020-06-25 PROCEDURE — 86900 BLOOD TYPING SEROLOGIC ABO: CPT | Performed by: OBSTETRICS & GYNECOLOGY

## 2020-06-25 PROCEDURE — 36415 COLL VENOUS BLD VENIPUNCTURE: CPT | Performed by: STUDENT IN AN ORGANIZED HEALTH CARE EDUCATION/TRAINING PROGRAM

## 2020-06-25 PROCEDURE — 85018 HEMOGLOBIN: CPT | Performed by: STUDENT IN AN ORGANIZED HEALTH CARE EDUCATION/TRAINING PROGRAM

## 2020-06-25 PROCEDURE — 12000001 ZZH R&B MED SURG/OB UMMC

## 2020-06-25 PROCEDURE — 25000132 ZZH RX MED GY IP 250 OP 250 PS 637: Performed by: STUDENT IN AN ORGANIZED HEALTH CARE EDUCATION/TRAINING PROGRAM

## 2020-06-25 RX ORDER — SENNOSIDES A AND B 8.6 MG/1
1 TABLET, FILM COATED ORAL DAILY
Qty: 60 TABLET | Refills: 3 | Status: SHIPPED | OUTPATIENT
Start: 2020-06-25 | End: 2020-08-06

## 2020-06-25 RX ADMIN — ACETAMINOPHEN 650 MG: 325 TABLET, FILM COATED ORAL at 11:16

## 2020-06-25 RX ADMIN — IBUPROFEN 800 MG: 800 TABLET ORAL at 17:25

## 2020-06-25 RX ADMIN — ACETAMINOPHEN 650 MG: 325 TABLET, FILM COATED ORAL at 00:03

## 2020-06-25 RX ADMIN — IBUPROFEN 800 MG: 800 TABLET ORAL at 11:16

## 2020-06-25 RX ADMIN — DOCUSATE SODIUM AND SENNOSIDES 1 TABLET: 8.6; 5 TABLET, FILM COATED ORAL at 08:22

## 2020-06-25 RX ADMIN — IBUPROFEN 800 MG: 800 TABLET ORAL at 04:55

## 2020-06-25 RX ADMIN — ACETAMINOPHEN 650 MG: 325 TABLET, FILM COATED ORAL at 04:55

## 2020-06-25 RX ADMIN — ACETAMINOPHEN 650 MG: 325 TABLET, FILM COATED ORAL at 17:25

## 2020-06-25 NOTE — PROVIDER NOTIFICATION
06/24/20 1938   Provider Notification   Provider Name/Title Dr. Mason   Method of Notification Electronic Page   Notification Reason Bleeding     Provider notified patient continuing to trickle blood following fundal checks. Additional 169 mL blood loss following 380 from delivery. Firm, no clots.

## 2020-06-25 NOTE — L&D DELIVERY NOTE
Delivery Summary    Arely Carranza MRN# [id number]   Age: 35 year old YOB: 1985     Vaginal Delivery Summary    39w0d was admitted for elective induction of labor at term with favorable cervix.  Group B strep negative.  Pitocin was started with a max rate of 8 milliunits and epidural was placed.  AROM with clear fluid and patient was complete approximately 2 hours later           Pushed to deliver viable male infant on 2020 at 1805 with spontaneous cry.   Anterior shoulder delivered with ease followed by posterior shoulder.   Put on mother's chest where delayed cord clamping was done.    Placenta spontaneous with controlled traction on the cord, intact, 3 vessels and Pitocin IV given with good tone.       Second degree laceration infiltrated with 1% lidocaine and repaired using 3-0 Vicryl suture in the usual fashion.  Mother and infant in stable condition.  No complications.    WILDA MONTEMAYOR MD           Labor Event Times    Labor onset date:  20 Onset time:  11:30 AM   Dilation complete date:  20 Complete time:   5:50 PM   Start pushing date/time:  2020 1751      Labor Length    3rd Stage (hrs):  0 (min):  3      Labor Events     labor?:  No   steroids:  None  Labor Type:  Induction/Cervical ripening, AROM  Predominate monitoring during 1st stage:  continuous electronic fetal monitoring     Antibiotics received during labor?:  No     Rupture identifier:  Sac 1  Rupture date/time: 20 1535   Rupture type:  Artificial Rupture of Membranes  Fluid color:  Clear  Fluid odor:  Normal     Induction:  Oxytocin, AROM  Induction date/time: 20 0930   Cervical ripening date/time:     Indications for induction:  Elective     1:1 continuous labor support provided by?:  none Labor partogram used?:  no      Delivery/Placenta Date and Time    Delivery Date:  20 Delivery Time:   6:05 PM   Placenta Date/Time:  2020  6:08 PM  Oxytocin given  "at the time of delivery:  after delivery of baby     Vaginal Counts     Initial count performed by 2 team members:   Two Team Members   dr. Isabella Snyder, RN       Needles Suture [place] Sponges Instruments   Initial counts 2 0 5    Added to count 0 1 0    Final counts 2 1 5    Placed during labor Accounted for at the end of labor   NA    NA    NA     Final count performed by 2 team members:   Two Team Members   NALLELY Lyles Dr.      Final count correct?:  Yes     Apgars    Living status:  Living   1 Minute 5 Minute 10 Minute 15 Minute 20 Minute   Skin color: 1  1       Heart rate: 2  2       Reflex irritability: 2  2       Muscle tone: 2  2       Respiratory effort: 2  2       Total: 9  9       Apgars assigned by:  JORGE BHATT RNC     Cord    Vessels:  3 Vessels Complications:  Nuchal   Cord Blood Disposition:  Lab Gases Sent?:  No      [place] Resuscitation    Methods:  None   Care at Delivery:  Spontaneous cry to moms abd dried and stim. Good tone, and color      [place] Measurements    Weight:  6 lb 14 oz Length:  1' 7.25\"   Head circumference:  34.9 cm       Skin to Skin and Feeding Plan    Skin to skin initiation date/time: 1841    Skin to skin with:  Mother  Skin to skin end date/time:     Breastfeeding initiated date/time:  2020 1825  How do you plan to feed your baby:  Breastfeeding     Delivery (Maternal) (Provider to Complete) ([id number])    Episiotomy:  None  Perineal lacerations:  2nd Repaired?:  Yes      Blood Loss  Mother: Kassy LazaroArely alaniz #[id number]   Start of Mother's Information    IO Blood Loss  20 1130 - 20 2138    Delivery QBL (mL) Hospital Encounter 574 mL    Total  574 mL         End of Mother's Information  Mother: Kassy LazaroArely alaniz #[id number]         Delivery - Provider to Complete ([id number])    Delivering clinician:  Kaylie Mason MD  Delivery Type (Choose the 1 that will go to the Birth History):  Vaginal, Spontaneous   Other " personnel:   Provider Role   Cely Snyder, RN Delivery Nurse   Ira Bustos RN Registered Nurse         Placenta    Delayed Cord Clamping:  Done  Date/Time:  6/24/2020  6:08 PM  Removal:  Spontaneous  Disposition:  Hospital disposal     Anesthesia    Method:  Epidural  Cervical dilation at placement:  0-3          Presentation and Position    Presentation:  Vertex  Position:  Left Occiput Anterior           WILDA MONTEMAYOR MD

## 2020-06-25 NOTE — PLAN OF CARE
VSS and postpartum assessments WDL.  Up ad fabrice with steady gait.  Independent with cares.  Bonding well with infant.  Breastfeeding on cue independently.  Pain managed with tylenol and ibuprofen.  PIV in right, saline locked.  Bal is present and supportive.  Will continue with postpartum cares and education per plan of care.

## 2020-06-25 NOTE — PROGRESS NOTES
Pt with additional 169 ml of blood loss after delivery. Trickling with each fundal massage. platelets stable at 95 so epidural was removed.    vigorous fundal massage with small clot removed. Scant bleeding. Discussed with RN and patient.    Kaylie Mason MD

## 2020-06-25 NOTE — PROVIDER NOTIFICATION
06/24/20 9017   Provider Notification   Provider Name/Title Dr. Mason   Method of Notification At Bedside     Provider notified patient having intermittent trickling of blood with fundal checks. No clots and bleeding not in excess of expected. Will continue to monitor.

## 2020-06-25 NOTE — PROGRESS NOTES
[facility] Obstetrics Post-Partum Progress Note     Postpartum day # 1    SUBJECTIVE:   No complaints.  Very happy that the delivery went so smoothly.   Bleeding is appropriate, experiencing significant cramping but uterus is small.    Breast feeding going ok.           Physical Exam:     Vitals:    06/24/20 2200 06/24/20 2330 06/25/20 0500 06/25/20 0831   BP: 114/65 119/69 112/76 118/72   Pulse: 73 59  67   Resp:  18 18 17   Temp: 97.8  F (36.6  C) 97.6  F (36.4  C)  98.1  F (36.7  C)   TempSrc: Oral Oral  Oral   SpO2: 97%      Weight:       Height:          Gen:  NAD,   normal respiratory and cardiovascular effort   ABD:  Soft, NT   Uterine fundus is firm, non-tender and well below the level of the umbilicus  bilateral LE's: trace edema, nontender  Has thigh high jobst stockings on right leg.     Hemoglobin   Date Value Ref Range Status   06/25/2020 12.7 11.7 - 15.7 g/dL Final   06/24/2020 13.9 11.7 - 15.7 g/dL Final              Assessment and Plan:    Doing well PPD # 1  Patient and her son are both Rh negative. No need for rhogam.   Routine cares, reassured that the cramping is always worse second time around.   discussed normal expectations for postpartum.     Abdominal binder prn  Plan D/C early tomorrow.    Has ibuprofen and tylenol at home.  rx for senokot sent to  pharmacy for discharge   Roma Garcia MD

## 2020-06-25 NOTE — PLAN OF CARE
Data: VSS, postpartum assessments WNL. She is voiding without difficulty, up ad fabrice, passing gas, eating and drinking normally. Perineum appears to be healing well, swelling noted (using ice and tucks), lochia WNL, no s/s infection. She is independent with self and infant cares. Breastfeeding infant with no assistance. Taking prn tylenol and ibuprofen for cramping pain. EDS score of 4.  Reports good pain management.   Action: Education provided on discharge goals.   Response: Pt is agreeable with her plan of care. Positive attachment behaviors observed with infant. Support person,  Bal, present. Anticipate discharge 6/26.

## 2020-06-25 NOTE — PLAN OF CARE
Data: Arely Carranza transferred to [facility] room 7144 via wheelchair at 2030. Baby transferred via parent's arms.  Action: Receiving unit notified of transfer: Yes. Patient and family notified of room change. Report given to NALLELY Potts at 2040. Belongings sent to receiving unit. Accompanied by Registered Nurse. Oriented patient to surroundings. Call light within reach. ID bands double-checked with receiving RN.  Response: Patient tolerated transfer and is stable.

## 2020-06-25 NOTE — PROGRESS NOTES
"    Anesthesia Post-Partum Follow-Up Note After  with Epidural    Patient: Arely Carranza    Patient location: Post-partum floor.    Anesthesia type: Epidural    Subjective:     She denies headache, does not complain of pruritis at this time. She denies weakness, denies paresthesia, denies difficulties breathing or voiding, and denies nausea or vomiting.    Objective:    Respiratory Function (RR / SpO2 / Airway Patency): Satisfactory    Cardiac Function (HR / Rhythm / BP): Satisfactory    Strength and sensation lower extremities: Normal    Site of epidural insertion: No signs of infection or inflammation.     Last Vitals: /72   Pulse 67   Temp 36.7  C (98.1  F) (Oral)   Resp 17   Ht 1.676 m (5' 6\")   Wt 68.9 kg (152 lb)   LMP 2019   SpO2 97%   Breastfeeding Unknown   BMI 24.53 kg/m      Assessment and plan:   Arely Carranza is a 35 year old female  post-partum #1 s/p  No admission procedures for hospital encounter.. A lumbar epidural catheter was successfully inserted without technical challenges. This successfully provided labor analgesia via PCEA pump infusing Bupivacaine with Fentanyl 2mcg/mL. The patient delivered via  and the epidural catheter was removed immediately thereafter by the L&D RN with the catheter tip intact per chart review.     At this time, there is no evidence of adverse side effects associated with the insertion or removal of the epidural catheter. If the patient develops new lower extremity paresis or paresthesias; or if there are concerns regarding the insertion site of the catheter, please reach out to the Dept of Anesthesia.    Thank you for including us in the care for this patient.  If any questions or concerns, please call anesthesia OB resident at [phone] first, following by attending at [phone] if no answer.      Allan Garcia III, MD   Anesthesiology Resident PGY-2          "

## 2020-06-25 NOTE — PLAN OF CARE
Date of Service:  2020  3:53 AM    Creation Time:  2020  3:53 AM                  []Hide copied text    []Hover for details  Pt is stable tonight. Breastfeeding going well with good latch verified. Educated parents on  safety.  Pt is voiding with out difficulty. Pt did not want the baby to have hepatitis B vaccine tonight. Continue to support as needed.

## 2020-06-26 VITALS
BODY MASS INDEX: 24.43 KG/M2 | OXYGEN SATURATION: 97 % | HEIGHT: 66 IN | DIASTOLIC BLOOD PRESSURE: 74 MMHG | TEMPERATURE: 97.9 F | SYSTOLIC BLOOD PRESSURE: 129 MMHG | HEART RATE: 72 BPM | WEIGHT: 152 LBS | RESPIRATION RATE: 18 BRPM

## 2020-06-26 PROCEDURE — 25000132 ZZH RX MED GY IP 250 OP 250 PS 637: Performed by: STUDENT IN AN ORGANIZED HEALTH CARE EDUCATION/TRAINING PROGRAM

## 2020-06-26 RX ADMIN — DOCUSATE SODIUM AND SENNOSIDES 2 TABLET: 8.6; 5 TABLET, FILM COATED ORAL at 06:18

## 2020-06-26 RX ADMIN — ACETAMINOPHEN 650 MG: 325 TABLET, FILM COATED ORAL at 06:17

## 2020-06-26 RX ADMIN — IBUPROFEN 800 MG: 800 TABLET ORAL at 06:17

## 2020-06-26 RX ADMIN — ACETAMINOPHEN 650 MG: 325 TABLET, FILM COATED ORAL at 00:09

## 2020-06-26 RX ADMIN — IBUPROFEN 800 MG: 800 TABLET ORAL at 00:09

## 2020-06-26 NOTE — PLAN OF CARE
VSS at this time. Post partum assessment WDL. Taking ibuprofen and tylenol for pain relief. Up and moving around independently. Is independent with baby cares in room- has good support from . Plan to discharge by 11am this morning. Will continue to monitor at this time.

## 2020-06-26 NOTE — PROGRESS NOTES
[facility], [place]    Post-Partum Progress Note    Assessment & Plan   Assessment:  Post-partum day #2  Normal spontaneous vaginal delivery    Doing well.    Plan:  Discharge today    Corrine Kulkarni     Interval History   Doing well.  Pain is well-controlled.  No fevers.  No history of foul-smelling vaginal discharge.  Good appetite.  Denies chest pain, shortness of breath, nausea or vomiting.  Vaginal bleeding is similar to a heavy menstrual flow.  Ambulatory.  Breastfeeding well.    Medications     - MEDICATION INSTRUCTIONS -       - MEDICATION INSTRUCTIONS -       - MEDICATION INSTRUCTIONS -       - MEDICATION INSTRUCTIONS -       oxytocin in 0.9% NaCl       oxytocin in 0.9% NaCl         senna-docusate  1 tablet Oral BID    Or     senna-docusate  2 tablet Oral BID       Physical Exam   Temp: 97.9  F (36.6  C) Temp src: Oral BP: 129/74 Pulse: 72   Resp: 18   O2 Device: None (Room air)    Vitals:    06/24/20 0906   Weight: 68.9 kg (152 lb)     Vital Signs with Ranges  Temp:  [97.8  F (36.6  C)-98.3  F (36.8  C)] 97.9  F (36.6  C)  Pulse:  [72-77] 72  Resp:  [16-18] 18  BP: (118-137)/(74-88) 129/74  No intake/output data recorded.    Uterine fundus is firm, non-tender and at the level of the umbilicus    Data   Recent Labs   Lab Test 06/24/20  0930   ABO A   RH Neg   AS Pos*     Recent Labs   Lab Test 06/25/20  0637 06/24/20  0930   HGB 12.7 13.9     Recent Labs   Lab Test 10/31/19  1326   RUQIGG 25

## 2020-06-26 NOTE — DISCHARGE SUMMARY
[facility], [place]    Discharge Summary  Obstetrics    Date of Admission:  2020  Date of Discharge:  2020  Discharging Provider: Corrine Kulkarni    Discharge Diagnoses   Spontaneous vaginal delivery    History of Present Illness   Arely Carranza is a 35 year old female who presented for elective induction of labor.     Hospital Course   The patient's hospital course was unremarkable. She was induced with pitocin and AROM. She received epidural. She delivered without complication.  She recovered as anticipated and experienced no post-delivery complications. On discharge, her pain was well controlled. Vaginal bleeding is similar to peak menstrual flow.  Voiding without difficulty.  Ambulating well and tolerating a normal diet.  No fevers.  Breastfeeding well.  Infant is stable.  She was discharged on post-partum day #2.    Post-partum hemoglobin:   Hemoglobin   Date Value Ref Range Status   2020 12.7 11.7 - 15.7 g/dL Final       [place] Depression Scale  Thoughts of Harming Self: 0-->never  Total Score: 4      Corrine Kulkarni MD    Discharge Disposition   Discharged to home   Condition at discharge: Stable    Primary Care Physician   Physician No Ref-Primary    Consultations This Hospital Stay   HOME CARE POST PARTUM/ IP CONSULT  ANESTHESIOLOGY IP CONSULT  LACTATION IP CONSULT    Discharge Orders      Activity    Review discharge instructions     Reason for your hospital stay    Maternity care     Follow Up and recommended labs and tests    Follow up in 6 weeks for postpartum visit     Discharge Instructions - Postpartum visit    Schedule postpartum visit with your provider and return to clinic in 6 weeks.     Diet    Resume previous diet     Discharge Medications   Current Discharge Medication List      START taking these medications    Details   senna (SENOKOT) 8.6 MG tablet Take 1 tablet by mouth daily  Qty: 60 tablet, Refills: 3     Associated Diagnoses:  (normal spontaneous vaginal delivery)         CONTINUE these medications which have NOT CHANGED    Details   loratadine (CLARITIN) 10 MG tablet Take 10 mg by mouth daily      Prenatal Vit-Fe Fumarate-FA (PRENATAL MULTIVITAMIN W/IRON) 27-0.8 MG tablet Take 1 tablet by mouth daily      acetaminophen (TYLENOL) 325 MG tablet Take 2 tablets (650 mg) by mouth every 6 hours as needed for mild pain Start after Delivery.  Qty: 100 tablet, Refills: 0    Associated Diagnoses: Prenatal care in third trimester      albuterol (PROAIR HFA/PROVENTIL HFA/VENTOLIN HFA) 108 (90 Base) MCG/ACT inhaler Inhale 2 puffs into the lungs every 6 hours as needed for shortness of breath / dyspnea or wheezing  Qty: 1 Inhaler, Refills: 3    Comments: Pharmacy may dispense brand covered by insurance (Proair, or proventil or ventolin or generic albuterol inhaler)  Associated Diagnoses: Mild intermittent asthma without complication      ibuprofen (ADVIL/MOTRIN) 600 MG tablet Take 1 tablet (600 mg) by mouth every 6 hours as needed for moderate pain Start after delivery  Qty: 60 tablet, Refills: 0    Associated Diagnoses: Prenatal care in third trimester      senna-docusate (SENOKOT-S/PERICOLACE) 8.6-50 MG tablet Take 1 tablet by mouth daily Start after delivery.  Qty: 100 tablet, Refills: 0    Associated Diagnoses: Prenatal care in third trimester         STOP taking these medications       Misc. Devices (BREAST PUMP) MISC Comments:   Reason for Stopping:         ondansetron (ZOFRAN) 4 MG tablet Comments:   Reason for Stopping:             Allergies   Allergies   Allergen Reactions     Penicillins      Sulfa Drugs

## 2020-06-26 NOTE — PLAN OF CARE
Data: Vital signs within normal limits. Postpartum checks within normal limits - see flow record. Patient eating and drinking normally. Patient able to empty bladder independently and is up ambulating. No apparent signs of infection.  2nd degree tear is  healing well. Patient performing self cares and is able to care for infant.  Action: Patient medicated during the shift for cramping. See MAR. Patient reassessed within 1 hour after each medication and pain was improved - patient stated she was comfortable. Patient education done about self cares and infant cares. See flow record.  Response: Positive attachment behaviors observed with infant. Support persons spouse present.   Plan: Anticipate discharge on tomorrow.

## 2020-06-26 NOTE — PLAN OF CARE
Patient's vss, postpartum assessment wdl. Patient has met goals for discharge. Discharge instructions reviewed with patient. Follow up plan reviewed. Patient stable at time of discharge. Discharged to home.

## 2020-07-27 ENCOUNTER — MEDICAL CORRESPONDENCE (OUTPATIENT)
Dept: HEALTH INFORMATION MANAGEMENT | Facility: CLINIC | Age: 35
End: 2020-07-27

## 2020-07-31 ENCOUNTER — MYC MEDICAL ADVICE (OUTPATIENT)
Dept: OBGYN | Facility: CLINIC | Age: 35
End: 2020-07-31

## 2020-07-31 DIAGNOSIS — M62.89 PELVIC FLOOR DYSFUNCTION: Primary | ICD-10-CM

## 2020-07-31 NOTE — TELEPHONE ENCOUNTER
Please see V-Keyt message. Patient has her PP visit scheduled with you on 8/6, but is requesting a referral for PT because she believes she has a cystocele and would like to learn some exercises to keep it from getting worse. Referral is pending if ok. Carolyn Cruz RN

## 2020-08-06 ENCOUNTER — PRENATAL OFFICE VISIT (OUTPATIENT)
Dept: OBGYN | Facility: CLINIC | Age: 35
End: 2020-08-06
Payer: COMMERCIAL

## 2020-08-06 VITALS
TEMPERATURE: 98.3 F | DIASTOLIC BLOOD PRESSURE: 72 MMHG | HEART RATE: 70 BPM | SYSTOLIC BLOOD PRESSURE: 112 MMHG | WEIGHT: 134.8 LBS | BODY MASS INDEX: 21.76 KG/M2

## 2020-08-06 DIAGNOSIS — Z30.430 ENCOUNTER FOR INSERTION OF INTRAUTERINE CONTRACEPTIVE DEVICE: ICD-10-CM

## 2020-08-06 PROCEDURE — 58300 INSERT INTRAUTERINE DEVICE: CPT | Performed by: OBSTETRICS & GYNECOLOGY

## 2020-08-06 PROCEDURE — 99207 ZZC POST PARTUM EXAM: CPT | Performed by: OBSTETRICS & GYNECOLOGY

## 2020-08-06 ASSESSMENT — PATIENT HEALTH QUESTIONNAIRE - PHQ9: SUM OF ALL RESPONSES TO PHQ QUESTIONS 1-9: 7

## 2020-08-06 NOTE — NURSING NOTE
"Chief Complaint   Patient presents with     Post Partum Exam     IUD     mirena insertion NDC: 48168-401-66, LOT: [id number], EXP: 10/2022       Initial /72   Pulse 70   Temp 98.3  F (36.8  C) (Oral)   Wt 61.1 kg (134 lb 12.8 oz)   Breastfeeding Yes   BMI 21.76 kg/m   Estimated body mass index is 21.76 kg/m  as calculated from the following:    Height as of 20: 1.676 m (5' 6\").    Weight as of this encounter: 61.1 kg (134 lb 12.8 oz).  BP completed using cuff size: regular    Questioned patient about current smoking habits.  Pt. has never smoked.          The following HM Due: NONE      The following patient reported/Care Every where data was sent to:  P ABSTRACT QUALITY INITIATIVES [[id number]]        patient has appointment for today  Rose Mendes                "

## 2020-08-06 NOTE — PROGRESS NOTES
[facility]- OBGYN    CC: routine postpartum visit.    S:Arely Carranza is a 35 year old  s/p  on 2020 after eIOL c/b second degree perineal laceration who presents today for routine postpartum visit. Patient reports some urinary leakage with jumping and activity.  She feels the sensation of bladder prolapse as well.  She also notes urinary frequency, but is drinking a lot of fluids.  She has no issues with bowel function.  She has no bleeding or pain.  She states baby is doing well, eating well, and she is getting some sleep.  She has not yet resumed intercourse.  She plans Mirena IUD for birth control today.       O:   Patient Vitals for the past 24 hrs:   BP Temp Temp src Pulse Weight   20 1113 112/72 98.3  F (36.8  C) Oral 70 61.1 kg (134 lb 12.8 oz)   ]  Exam:  General- awake, alert, answering questions appropriately, appears comfortable  CV- regular rate  Lung- breathing comfortably on room air  - normal appearing external genitalia, slight cystocele bulge to the level of the hymen, with engagement of pelvic floor muscles significant lift observed, good muscle tone.  Normal appearing vaginal mucosa, normal appearing cervix   Ext- bilateral lower extremities with no edema      A&P: Arely Carranza is a 35 year old  s/p  on 2020 after eIOL c/b second degree perineal laceration who presents today for routine postpartum visit.    (Z39.2) Routine postpartum follow-up  (primary encounter diagnosis)  Comment: Patent recovering well.  Pelvic PT referral placed for bladder prolapse and patient will establish care next week.   Plan: Pelvic floor PT    (Z30.430) Encounter for insertion of intrauterine contraceptive device  Comment: Patient desired placement of IUD today.  Discussed placement at 6 wk vs. 8 wk pp and risk of expulsion as well as uterine perforation.  Would like placement today.   Plan: levonorgestrel (MIRENA) 20 MCG/24HR IUD,          levonorgestrel (MIRENA) 20 MCG/24HR IUD 20 mcg,        INSERTION INTRAUTERINE DEVICE    IUD Insertion:  CONSULT:    Is a pregnancy test required: No.  Patient has not been sexually active since delivery   Was a consent obtained?  Yes    Patient has been given the opportunity to ask questions about all forms of birth control, including all options appropriate for Arely Carranza. Discussed that no method of birth control, except abstinence is 100% effective against pregnancy or sexually transmitted infection.     Arely Carranza understands she may have the IUD removed at any time. IUD should be removed by a health care provider.    The entire insertion procedure was reviewed with the patient, including care after placement.    No LMP recorded. Last sexual activity: prior to delivery of baby. No allergy to betadine or shellfish.    PROCEDURE NOTE: -- IUD Insertion    Reason for Insertion: contraception  Under sterile technique, cervix was visualized with speculum and prepped with Betadine solution swab x 3. Tenaculum was placed for stability. The uterus was gently straightened and sounded to 7.5 cm using pipelle. IUD prepared for placement, and IUD inserted according to 's instructions without difficulty or significant resitance, and deployed at the fundus. The strings were visualized and trimmed to 3.0 cm from the external os. Tenaculum was removed and hemostasis noted. Speculum removed.  Patient tolerated procedure well.  LOT: [id number], EXP: 10/2022    EBL: minimal    Complications: none    ASSESSMENT:     ICD-10-CM    1. Routine postpartum follow-up  Z39.2         PLAN:    Given 's handouts, including when to have IUD removed, list of danger s/sx, side effects and follow up recommended. Encouraged condom use for prevention of STD. Back up contraception advised for 7 days if progestin method. Advised to call for any fever, for prolonged or severe pain or bleeding, abnormal  vaginal discharge, or unable to palpate strings. She was advised to use pain medications (ibuprofen) as needed for mild to moderate pain. Advised to follow-up in clinic in 4-6 weeks for IUD string check if unable to find strings or as directed by provider.     Shruthi Keys MD

## 2020-08-21 ENCOUNTER — THERAPY VISIT (OUTPATIENT)
Dept: PHYSICAL THERAPY | Facility: CLINIC | Age: 35
End: 2020-08-21
Payer: COMMERCIAL

## 2020-08-21 DIAGNOSIS — N39.46 MIXED INCONTINENCE URGE AND STRESS (MALE)(FEMALE): ICD-10-CM

## 2020-08-21 DIAGNOSIS — R27.9 LACK OF COORDINATION: ICD-10-CM

## 2020-08-21 DIAGNOSIS — M62.89 PELVIC FLOOR DYSFUNCTION IN FEMALE: ICD-10-CM

## 2020-08-21 DIAGNOSIS — M62.89 PELVIC FLOOR DYSFUNCTION: ICD-10-CM

## 2020-08-21 PROCEDURE — 97530 THERAPEUTIC ACTIVITIES: CPT | Mod: GP | Performed by: PHYSICAL THERAPIST

## 2020-08-21 PROCEDURE — 97161 PT EVAL LOW COMPLEX 20 MIN: CPT | Mod: GP | Performed by: PHYSICAL THERAPIST

## 2020-08-21 PROCEDURE — 97110 THERAPEUTIC EXERCISES: CPT | Mod: GP | Performed by: PHYSICAL THERAPIST

## 2020-08-21 NOTE — PROGRESS NOTES
[facility] Initial Evaluation  Subjective:  The history is provided by the patient.   Patient Health History  Arely Carranza being seen for cystocele, PF PT.     Problem began: 8/6/2020 (MD consult).   Problem occurred: pregnancies, deliveries   Pain is reported as 0/10 on pain scale.  General health as reported by patient is good.  Pertinent medical history includes: asthma and incontinence.   Red flags:  None as reported by patient.  Medical allergies: other. Other medical allergies details: PCN, sulfas.   Surgeries include:  Orthopedic surgery. Other surgery history details: bone cyst in foot.    Current medications:  Other. Other medications details: multivit's, calcium, albuterol PRN.    Current occupation is anesthesiologist.   Primary job tasks include:  Computer work and prolonged standing.   Other job/home tasks details: childcare, housework, cooking.                Therapist Generated HPI Evaluation  Problem details: Arely Carranza is a 35 year old year old female with a pelvic floor condition. Patient reports onset of symptoms after her last delivery on 6/24/20.Symptoms include prolapse, incomplete bladder emptying, stress incontinence. Since onset symptoms have been getting better, worse or staying the same? Gradually improving for incontinence, but prolapse is staying the same.   Urination:  Do you leak on the way to the bathroom or with a strong urge to void? No   Do you leak with cough,sneeze, jumping, running?Yes   Any other activities that cause leaking? No   Do you have triggers that make you feel you can't wait to go to the bathroom? No what are they NA.  Type of pad and number used per day? Pantiliner, 1/day  When you leak what is the amount? Small spurts typically, with running constant dribbling  How long can you delay the need to urinate? 3 - 10 minutes.   How many times do you get up to urinate at night? 1   Can you stop the flow of urine when on the  toilet? No  Is the volume of urine passed usually: medium. (8sec rule= 250ml with average bladder storing 400-600ml)  Do you strain to pass urine? No  Do you have a slow or hesitant urinary stream? Occasionally, slight hesitation  Do you have difficulty initiating the urine stream? No  Is urination painful? No  How many bladder infections have you had in last 12 months? 0  Fluid intake(one glass is 8oz or one cup) around 8 glasses/day, 2 cups of coffee for caffinated glasses/day  0-1 alcohol glasses/day.  Bowel habits:  Frequency of bowel movements? 1 times a day  Consistancy of stool? soft formed, [place] Stool Scale 4-5 usually, occasionally 6  Do you ignore the urge to defecate? No  Do you strain to pass stool? No  Pelvic Pain:  Do you have any pelvic pain with intercourse, exams, use of tampons? Pt has not tried sexual intercourse yet, but after her first delivery she had some pain with sexual intercourse for 6 months  Pain level rate at 2-3/10 w/IUD placement/speculum  Are you sexually active?No  Is initial penetration during intercourse painful? Not asked  Is deeper penetration painful? Not asked  Do you use lubricant? Not asked     Given birth? Yes Any complications? Yes  # of vaginal delieveries?2  # of C-sections?0  # of episiotomies?0.  Have you ever been worried for your physical safety? No   Any abdominal or pelvic surgeries? no  Are you having any regular exercise?tried jumping jacks, but felt a lot of dropping in her vaginal area  Have you practiced the PF(kegel) exercises for 4 or more weeks? Yes, but not sure if doing right  Thyroid checked?yes (related to hair loss, flu-like symptoms, wt gain/loss, fatigue, menopause)  Changed diet lately?no.         Type of problem:  Pelvic dysfunction.    This is a new condition.  Condition occurred with:  After pregnancy.    Patient reports pain:  N/a.                                       Objective:  System                                 Pelvic Dysfunction  Evaluation:    Bladder/Pelvic Problems:    Storage Problem:  Stress incontinence and frequency  Emptying Problem:  Incomplete emptying      Diagnostic Tests:  none                        Flexibility:  normal      Abdominal Wall:  Abdominal wall pelvic: DR measures 1 inch long, 1 finger wide.  Diastasis Recti:  Present      Pelvic Clock Exam:    Ischiocavernosis pain:  -  Bulbocavernosis pain:  -  Transverse Perineal:  -  Levator ANI:  -  Perineal Body:  ++    Reflex Testing:  normal    External Assessment:    Skin Condition:  Normal  Scars:  Well healed, painful and tear  Bearing Down/Coughing:  Cystourethrocele  Tissue Symmetry:  Normal  Introitus:  Normal  Muscle Contraction/Perineal Mobility:  Slight lift, no urogential triangle descent  Internal Assessment:  Internal assessment pelvic: mild prolapse, appears to be more of a uterine prolapse than bladder and no rectocele.  Sensory Exam:  Normal  Contraction/Grade:  Weak squeeze, 2 second hold (2)  Accessory Muscle use-Abdominals:  None  Accessory Muscle use-Gluteals:  None  Accessory Muscle use-Adductors:  None  Symmetry of Contraction Response:  Equal  SEMG Biofeedback:    Equipment:  MR-10 unit    Suraface electrode placement--Perianal:  Yes, placed bilat'ly  Baseline EMG PM:  1.2 uV    Peak pelvic muscle contraction:  23 uV  Sustained contraction:  Avg of 12 uV, some slight drops in the 10 sec hold time  EMG interpretation to fatigue:  5-8 seconds  Position:  Supine                     General     ROS    Assessment/Plan:    Patient is a 35 year old female with pelvic complaints.    Patient has the following significant findings with corresponding treatment plan.                Diagnosis 1:  Post-partum PF dysfunction  Pain -  hot/cold therapy, manual therapy, self management, education and home program  Decreased ROM/flexibility - manual therapy and therapeutic exercise  Decreased strength - therapeutic exercise and therapeutic activities  Impaired muscle  performance - biofeedback and neuro re-education  Decreased function - therapeutic activities    Therapy Evaluation Codes:   1) History comprised of:   Personal factors that impact the plan of care:      Anxiety, Past/current experiences and Time since onset of symptoms.    Comorbidity factors that impact the plan of care are:      Asthma.     Medications impacting care: None.  2) Examination of Body Systems comprised of:   Body structures and functions that impact the plan of care:      Pelvis.   Activity limitations that impact the plan of care are:      Frequency and Stress incontinence.  3) Clinical presentation characteristics are:   Stable/Uncomplicated.  4) Decision-Making    Low complexity using standardized patient assessment instrument and/or measureable assessment of functional outcome.  Cumulative Therapy Evaluation is: Low complexity.    Previous and current functional limitations:  (See Goal Flow Sheet for this information)    Short term and Long term goals: (See Goal Flow Sheet for this information)     Communication ability:  Patient appears to be able to clearly communicate and understand verbal and written communication and follow directions correctly.  Treatment Explanation - The following has been discussed with the patient:   RX ordered/plan of care  Anticipated outcomes  Possible risks and side effects  This patient would benefit from PT intervention to resume normal activities.   Rehab potential is good.    Frequency:  1 X week, once daily  Duration:  for 6 weeks  Discharge Plan:  Achieve all LTG.  Independent in home treatment program.  Reach maximal therapeutic benefit.    Please refer to the daily flowsheet for treatment today, total treatment time and time spent performing 1:1 timed codes.

## 2020-08-31 ENCOUNTER — MEDICAL CORRESPONDENCE (OUTPATIENT)
Dept: HEALTH INFORMATION MANAGEMENT | Facility: CLINIC | Age: 35
End: 2020-08-31

## 2020-09-09 ENCOUNTER — THERAPY VISIT (OUTPATIENT)
Dept: PHYSICAL THERAPY | Facility: CLINIC | Age: 35
End: 2020-09-09
Payer: COMMERCIAL

## 2020-09-09 DIAGNOSIS — M62.89 PELVIC FLOOR DYSFUNCTION IN FEMALE: ICD-10-CM

## 2020-09-09 DIAGNOSIS — R27.9 LACK OF COORDINATION: ICD-10-CM

## 2020-09-09 DIAGNOSIS — N39.46 MIXED INCONTINENCE URGE AND STRESS (MALE)(FEMALE): ICD-10-CM

## 2020-09-09 PROCEDURE — 97110 THERAPEUTIC EXERCISES: CPT | Mod: GP | Performed by: PHYSICAL THERAPIST

## 2020-09-09 PROCEDURE — 97530 THERAPEUTIC ACTIVITIES: CPT | Mod: GP | Performed by: PHYSICAL THERAPIST

## 2020-09-09 PROCEDURE — 97112 NEUROMUSCULAR REEDUCATION: CPT | Mod: GP | Performed by: PHYSICAL THERAPIST

## 2020-09-22 ENCOUNTER — THERAPY VISIT (OUTPATIENT)
Dept: PHYSICAL THERAPY | Facility: CLINIC | Age: 35
End: 2020-09-22
Payer: COMMERCIAL

## 2020-09-22 DIAGNOSIS — R27.9 LACK OF COORDINATION: ICD-10-CM

## 2020-09-22 DIAGNOSIS — M62.89 PELVIC FLOOR DYSFUNCTION IN FEMALE: ICD-10-CM

## 2020-09-22 DIAGNOSIS — N39.46 MIXED INCONTINENCE URGE AND STRESS (MALE)(FEMALE): ICD-10-CM

## 2020-09-22 PROCEDURE — 97110 THERAPEUTIC EXERCISES: CPT | Mod: GP | Performed by: PHYSICAL THERAPIST

## 2020-09-22 PROCEDURE — 97530 THERAPEUTIC ACTIVITIES: CPT | Mod: GP | Performed by: PHYSICAL THERAPIST

## 2020-09-24 ENCOUNTER — IMMUNIZATION (OUTPATIENT)
Dept: NURSING | Facility: CLINIC | Age: 35
End: 2020-09-24
Payer: COMMERCIAL

## 2020-09-24 PROCEDURE — 90471 IMMUNIZATION ADMIN: CPT

## 2020-09-24 PROCEDURE — 90686 IIV4 VACC NO PRSV 0.5 ML IM: CPT

## 2020-10-27 ENCOUNTER — MEDICAL CORRESPONDENCE (OUTPATIENT)
Dept: HEALTH INFORMATION MANAGEMENT | Facility: CLINIC | Age: 35
End: 2020-10-27

## 2020-10-30 ENCOUNTER — THERAPY VISIT (OUTPATIENT)
Dept: PHYSICAL THERAPY | Facility: CLINIC | Age: 35
End: 2020-10-30
Payer: COMMERCIAL

## 2020-10-30 DIAGNOSIS — M62.89 PELVIC FLOOR DYSFUNCTION IN FEMALE: ICD-10-CM

## 2020-10-30 DIAGNOSIS — R27.9 LACK OF COORDINATION: ICD-10-CM

## 2020-10-30 DIAGNOSIS — N39.46 MIXED INCONTINENCE URGE AND STRESS (MALE)(FEMALE): ICD-10-CM

## 2020-10-30 PROCEDURE — 97530 THERAPEUTIC ACTIVITIES: CPT | Mod: GP | Performed by: PHYSICAL THERAPIST

## 2020-10-30 PROCEDURE — 97110 THERAPEUTIC EXERCISES: CPT | Mod: GP | Performed by: PHYSICAL THERAPIST

## 2020-10-30 PROCEDURE — 97112 NEUROMUSCULAR REEDUCATION: CPT | Mod: GP | Performed by: PHYSICAL THERAPIST

## 2020-10-30 NOTE — PROGRESS NOTES
Subjective:  HPI  Physical Exam                    Objective:  System    Physical Exam    General     ROS    Assessment/Plan:    PROGRESS  REPORT    Progress reporting period is from 8/21/20 to 10/30/20.       SUBJECTIVE  Subjective changes noted by patient:   Pt states that she has returned to work again and has noticed that she can wait 3 hrs most of the time btwn voids. She has not had any leaking, but also has not been running yet and that is when she would leak previously. She does still feel the bulging sensation, typically with lifting or doing heavier work.    Current pain level is  0/10.     Previous pain level was  0/10  .   Changes in function:  Yes (See Goal flowsheet attached for changes in current functional level)  Adverse reaction to treatment or activity: None    OBJECTIVE  Changes noted in objective findings:    DR measured; <1/2 cm wide and no gap top to bottom.  PF strength is at functional levels with avg's of 10 uV for sustained and 13 uV for quick contractions. Visually, did not see any prolapse with cough in supine position; however, pt reported a mild prolapse sensation during side lunges and lifting 10 lb from floor level.     ASSESSMENT/PLAN  Updated problem list and treatment plan: Diagnosis 1:  Post-partum pelvic floor dysfunction w/prolapse and stress urinary incontinence  Decreased strength - therapeutic exercise and therapeutic activities  Impaired muscle performance - biofeedback and neuro re-education  Decreased function - therapeutic activities  STG/LTGs have been met or progress has been made towards goals:  Yes (See Goal flow sheet completed today.)  Assessment of Progress: The patient's condition is improving.  The patient's condition has potential to improve.  Patient is meeting short term goals and is progressing towards long term goals.  Self Management Plans:  Patient has been instructed in a home treatment program.  Patient  has been instructed in self management of  symptoms.  I have re-evaluated this patient and find that the nature, scope, duration and intensity of the therapy is appropriate for the medical condition of the patient.  Arely continues to require the following intervention to meet STG and LTG's:  PT    Recommendations:  This patient would benefit from continued therapy.     Frequency:  2 X a month, once daily  Duration:  for 2 months        Please refer to the daily flowsheet for treatment today, total treatment time and time spent performing 1:1 timed codes.

## 2020-12-08 PROBLEM — R27.9 LACK OF COORDINATION: Status: RESOLVED | Noted: 2020-08-21 | Resolved: 2020-12-08

## 2020-12-08 PROBLEM — N39.46 MIXED INCONTINENCE URGE AND STRESS (MALE)(FEMALE): Status: RESOLVED | Noted: 2020-08-21 | Resolved: 2020-12-08

## 2020-12-08 PROBLEM — M62.89 PELVIC FLOOR DYSFUNCTION IN FEMALE: Status: RESOLVED | Noted: 2020-08-21 | Resolved: 2020-12-08

## 2020-12-08 NOTE — PROGRESS NOTES
Patient did not return for further treatment and no additional progress was noted.  Please refer to the progress note and goal flowsheet completed on 10/30/20 for discharge information.

## 2020-12-12 ENCOUNTER — VIRTUAL VISIT (OUTPATIENT)
Dept: FAMILY MEDICINE | Facility: OTHER | Age: 35
End: 2020-12-12
Payer: COMMERCIAL

## 2020-12-12 PROCEDURE — 99421 OL DIG E/M SVC 5-10 MIN: CPT | Performed by: INTERNAL MEDICINE

## 2020-12-12 NOTE — PROGRESS NOTES
"Date: 2020 08:04:37  Clinician: Marcie Sams  Clinician NPI: [id number]  Patient: Arely Carranza  Patient : 1985  Patient Address: [address] [place] [address]  Patient Phone: [phone]  Visit Protocol: Eye conditions  Patient Summary:  Arely is a 35 year old (: 1985 ) female who initiated a [facility] Visit for conjunctivitis.  When asked the question \"Please sign me up to receive news, health information and promotions from [facility].\", Arely responded \"No\".    Images of her eye condition were uploaded.   Her symptoms started 1-2 days ago and affect the left eye. The symptoms consist of itchy eye(s), eyelid swelling, bump(s) on the eyelid, and drainage coming from the eye(s).   Symptom details     Drainage: The color of the drainage coming out of her eye(s) is white. The drainage is watery but does not cause her eyelids to be stuck shut in the morning.    Itchiness: Arely has seasonal allergies or hay fever.    Eyelid bump(s): Arely has 1 bump on her eyelid. The bump(s) on her eyelid is not tender.     Denied symptoms include eye pain, light sensitivity, and eye redness. Arely has not experienced a decrease in vision and does not have subconjunctival hemorrhage. She does not feel feverish.   Precipitating events   She has not had a recent eye surgery, eye injury, foreign body in the eye(s), and cold or ear infection. She does not wear contact lenses.   Pertinent medical history  Arely has not ever been diagnosed with glaucoma.   Arely is not taking medication to treat her current symptoms.   Arely does not have diabetes.   Arely does not require proof of evaluation of her eye condition before returning to school, work, or .   Arely does not smoke or use smokeless tobacco.   She denies pregnancy and is breastfeeding. Her last period was over a month ago.   Additional information as reported by the patient (free text): The inner corner of my eye is red. Upper, lower, and " inner areas surrounding my eye are puffy/swollen. There is a mild-moderate sandpaper/foreign body sensation. Small amount whitish-tan discharge. Started 2 days ago and has gotten progressively worse.     MEDICATIONS: albuterol sulfate inhalation, Prenatal oral, ALLERGIES: Sulfa (Sulfonamide Antibiotics), Penicillins  Clinician Response:  Dear Arely,  Based on the information provided, you most likely have bacterial conjunctivitis, more commonly called pink eye.  Medication information  I am prescribing:  Polymyxin B sulf-trimethoprim (Polytrim) 10,000 unit- 1 mg/mL ophthalmic (eye) drops. Apply 1 drop into the affected eye(s) every 3 hours, up to 6 times a day for 7 days. There are no refills with this prescription.  The medication I prescribed is an antibiotic medication. Infections can be caused by either bacteria or a virus, and often have similar symptoms, so it is possible that this is a viral infection. Antibiotics are only effective against bacterial infections, so when it is caused by a virus, the medication will not help symptoms improve or make it less contagious.  Self care  To reduce the spread of the eye infection, you should not use eye makeup until the infection has fully resolved, and be sure to wash your hands at least once per hour and avoid touching the eyes as much as possible.  The following will reduce the risk for future eye infections:     Frequent handwashing    Replace towels and washcloths daily    Do not share towels and washcloths with others    Replace eye makeup used while eyes were infected    Do not use anyone else's eye makeup     Steps you can take to be as comfortable as possible:     Take regular breaks and remember to blink regularly when reading or using a computer for long periods of time    Wear sunglasses when outside    Wear eye protection when swimming or working with chemicals    Use good lighting     When to seek care  Please make an appointment to be seen in a clinic or  urgent care if any of the following occurs:     You develop new symptoms or your symptoms becomes worse.    Your symptoms do not improve within 2 days of starting treatment.      Diagnosis: Bacterial conjunctivitis  Diagnosis ICD: H10.9  Prescription: polymyxin B sulf-trimethoprim (Polytrim) 10,000 unit- 1 mg/mL ophthalmic (eye) drops 1 10 ml dropper bottle, 7 days supply. Apply 1 drop into the affected eye(s) every 3 hours up to 6 times a day for 7 days. Refills: 0, Refill as needed: no, Allow substitutions: yes  Pharmacy: [id number] - [phone] - [address] [place] [address]

## 2021-01-08 ENCOUNTER — MEDICAL CORRESPONDENCE (OUTPATIENT)
Dept: HEALTH INFORMATION MANAGEMENT | Facility: CLINIC | Age: 36
End: 2021-01-08

## 2021-07-06 NOTE — PROGRESS NOTES
GYN CLINIC VISIT  2021     CC: IUD removal    HPI:   Arely Carranza is a 36 year old  who presents to clinic today for an IUD removal.  She had a Mirena inserted on 2020 for post-partum contraception.   She desires removal of IUD because she desires pregnancy.        Last pap: 19    Reviewed GYN hx, OB hx, past medical hx, surgical hx and family hx.   Reviewed medications and allergies. Changes made in EPIC.     OBJECTIVE:   Vitals:    21 1357   BP: 120/72   Pulse: 91   SpO2: 99%   Weight: 52.6 kg (116 lb)     Body mass index is 18.72 kg/m .     Gen: alert, oriented, no distress, cooperative and pleasant  Abd: soft, nondistended  : normal external genitalia without lesions or abnormalities. Normal Bartholin's, normal La Plant's, normal urethra. Normal vaginal mucosa, no abnormal discharge or lesions. Cervix appears smooth, WNL, no lesions or erythema.     PROCEDURE: IUD REMOVAL  Patient has verbalized understanding of risks and benefits. All questions answered. She has signed the consent form.      A regular Kenneth speculum was placed in the vagina with good visualization of the cervix.  The IUD strings visualized at cervical os. IUD strings grasped with sterile ring forceps. Gentle traction applied and IUD removed intact without difficulty. There were no complications. The patient tolerated the procedure well.       ASSESSMENT:   Arely Carranza is a 36 year old  who had an IUD removed today without complication.    PLAN:  1. Encounter for IUD removal  Uncomplicated IUD removal.  Recommend cycle tracking and taking daily prenatal vitamin  RTC if not pregnant within 6 months of trying due to AMA  - Removal of Intrauterine Device      Venita Shaffer MD

## 2021-07-07 ENCOUNTER — OFFICE VISIT (OUTPATIENT)
Dept: OBGYN | Facility: CLINIC | Age: 36
End: 2021-07-07
Payer: COMMERCIAL

## 2021-07-07 VITALS
HEART RATE: 91 BPM | BODY MASS INDEX: 18.72 KG/M2 | DIASTOLIC BLOOD PRESSURE: 72 MMHG | WEIGHT: 116 LBS | SYSTOLIC BLOOD PRESSURE: 120 MMHG | OXYGEN SATURATION: 99 %

## 2021-07-07 DIAGNOSIS — Z30.432 ENCOUNTER FOR IUD REMOVAL: Primary | ICD-10-CM

## 2021-07-07 PROCEDURE — 58301 REMOVE INTRAUTERINE DEVICE: CPT | Performed by: OBSTETRICS & GYNECOLOGY

## 2021-08-19 ENCOUNTER — LAB (OUTPATIENT)
Dept: LAB | Facility: CLINIC | Age: 36
End: 2021-08-19

## 2021-08-19 ENCOUNTER — ALLIED HEALTH/NURSE VISIT (OUTPATIENT)
Dept: NURSING | Facility: CLINIC | Age: 36
End: 2021-08-19
Payer: COMMERCIAL

## 2021-08-19 DIAGNOSIS — Z67.91 RH NEGATIVE STATE IN ANTEPARTUM PERIOD: ICD-10-CM

## 2021-08-19 DIAGNOSIS — O26.899 RH NEGATIVE STATE IN ANTEPARTUM PERIOD: ICD-10-CM

## 2021-08-19 DIAGNOSIS — O20.9 FIRST TRIMESTER BLEEDING: ICD-10-CM

## 2021-08-19 LAB — B-HCG SERPL-ACNC: 632 IU/L (ref 0–5)

## 2021-08-19 PROCEDURE — 84702 CHORIONIC GONADOTROPIN TEST: CPT

## 2021-08-19 PROCEDURE — 96372 THER/PROPH/DIAG INJ SC/IM: CPT | Performed by: OBSTETRICS & GYNECOLOGY

## 2021-08-19 PROCEDURE — 36415 COLL VENOUS BLD VENIPUNCTURE: CPT

## 2021-08-21 ENCOUNTER — LAB (OUTPATIENT)
Dept: LAB | Facility: CLINIC | Age: 36
End: 2021-08-21
Payer: COMMERCIAL

## 2021-08-21 DIAGNOSIS — O20.9 FIRST TRIMESTER BLEEDING: ICD-10-CM

## 2021-08-21 LAB — B-HCG SERPL-ACNC: 904 IU/L (ref 0–5)

## 2021-08-21 PROCEDURE — 36415 COLL VENOUS BLD VENIPUNCTURE: CPT

## 2021-08-21 PROCEDURE — 84702 CHORIONIC GONADOTROPIN TEST: CPT

## 2021-08-23 ENCOUNTER — MYC MEDICAL ADVICE (OUTPATIENT)
Dept: OBGYN | Facility: CLINIC | Age: 36
End: 2021-08-23

## 2021-08-23 DIAGNOSIS — O20.9 FIRST TRIMESTER BLEEDING: Primary | ICD-10-CM

## 2021-08-24 ENCOUNTER — LAB (OUTPATIENT)
Dept: LAB | Facility: CLINIC | Age: 36
End: 2021-08-24
Payer: COMMERCIAL

## 2021-08-24 DIAGNOSIS — O20.9 FIRST TRIMESTER BLEEDING: ICD-10-CM

## 2021-08-24 LAB — B-HCG SERPL-ACNC: 1136 IU/L (ref 0–5)

## 2021-08-24 PROCEDURE — 36415 COLL VENOUS BLD VENIPUNCTURE: CPT

## 2021-08-24 PROCEDURE — 84702 CHORIONIC GONADOTROPIN TEST: CPT

## 2021-08-24 NOTE — TELEPHONE ENCOUNTER
Recommend repeat beta hCG in 2-3 days and ultrasound this week to rule out ectopic. Orders placed.  Venita Ragland MD

## 2021-08-24 NOTE — TELEPHONE ENCOUNTER
Pt sent mychart to follow up from lab result.  Aware that she needs hcg and ultrasound. Aminah Parish RN

## 2021-08-24 NOTE — TELEPHONE ENCOUNTER
Pt's hcg is not increasing normally and she'd like to know what the next steps are.  Can you please advise?  Aminah Parish, NALLELY        Component      Latest Ref Rng & Units 8/19/2021 8/21/2021 8/24/2021   HCG Quantitative Serum      0 - 5 IU/L 632 (H) 904 (H) 1,136 (H)

## 2021-08-27 ENCOUNTER — LAB (OUTPATIENT)
Dept: LAB | Facility: CLINIC | Age: 36
End: 2021-08-27
Payer: COMMERCIAL

## 2021-08-27 DIAGNOSIS — O20.9 FIRST TRIMESTER BLEEDING: ICD-10-CM

## 2021-08-27 LAB — B-HCG SERPL-ACNC: 1805 IU/L (ref 0–5)

## 2021-08-27 PROCEDURE — 84702 CHORIONIC GONADOTROPIN TEST: CPT

## 2021-08-27 PROCEDURE — 36415 COLL VENOUS BLD VENIPUNCTURE: CPT

## 2021-08-31 ENCOUNTER — ANCILLARY PROCEDURE (OUTPATIENT)
Dept: ULTRASOUND IMAGING | Facility: CLINIC | Age: 36
End: 2021-08-31
Attending: OBSTETRICS & GYNECOLOGY
Payer: COMMERCIAL

## 2021-08-31 ENCOUNTER — OFFICE VISIT (OUTPATIENT)
Dept: OBGYN | Facility: CLINIC | Age: 36
End: 2021-08-31
Attending: OBSTETRICS & GYNECOLOGY
Payer: COMMERCIAL

## 2021-08-31 VITALS
OXYGEN SATURATION: 96 % | SYSTOLIC BLOOD PRESSURE: 108 MMHG | DIASTOLIC BLOOD PRESSURE: 69 MMHG | WEIGHT: 120 LBS | HEART RATE: 80 BPM | BODY MASS INDEX: 19.37 KG/M2

## 2021-08-31 DIAGNOSIS — O20.0 THREATENED ABORTION: Primary | ICD-10-CM

## 2021-08-31 DIAGNOSIS — O20.9 FIRST TRIMESTER BLEEDING: ICD-10-CM

## 2021-08-31 PROCEDURE — 99213 OFFICE O/P EST LOW 20 MIN: CPT | Performed by: OBSTETRICS & GYNECOLOGY

## 2021-08-31 PROCEDURE — 76817 TRANSVAGINAL US OBSTETRIC: CPT | Performed by: OBSTETRICS & GYNECOLOGY

## 2021-08-31 NOTE — PROGRESS NOTES
S; Arely Carranza is a 36 year old  who present for us follow-up.  She is early pregnant, not exactly sure since she did not have a period after IUD removed.  She had 4-5days of a light period which seems likely to be implantation bleeding given findings on us today of 6w0d.  She has been followed for slowly rising quants and an episode of bleeding last week while at work.  She is nervous and concerned for overall well being of the pregnancy, but appropriate.    O: /69   Pulse 80   Wt 54.4 kg (120 lb)   SpO2 96%   BMI 19.37 kg/m      Gen: NAD    Dating (mm/dd/yyyy):   LMP: unknown                 EDC:  N/A            GA by LMP:         N/A     Current Scan On:  2021          EDC:  22  GA by Current Scan:        6w0d  The calculation of the gestational age by current scan was based on CRL.  Anatomy Scan:  Perla gestation.  Biometry:  CRL                       3.7 mm                6w0d                      Yolk Sac               NV                                                            Fetal heart activity:  Rate and rhythm is within normal limits.  Fetal heart rate: 118 bpm  Findings: Viable IUP, small gs with thickened walls?? MARITA = 0.5x 0.8x 0.7cm     Maternal Structures:  Cervix: The cervix appears long and closed.  Right Ovary: Not visualized   Left Ovary: Not visualized   Impression:      Early perla IUP with cardiac activity, measures 6w 0d by today's ultrasound, EDC 2022.  Unable to visualize yolk sac and has very thickened walls around embryo.  Would recommend a repeat ultrasound in 10 to 14 days to confirm viability.     WILDA MONTEMAYOR MD    A/P: threatened Ab   We reviewed the us together.  I explained the findings are concerning for a possible abnormal pregnancy resulting in miscarriage, but currently a live pregnancy.  We discussed multiple potential scenarios going forward.  For now, plan to repeat us next week, >7 days from now and go from  there.  Questions answered    DANIEL WHITLEY MD    17 minutes were spent on chart review, discussion with patient and documentation on day of visit.

## 2021-09-09 ENCOUNTER — HOSPITAL ENCOUNTER (OUTPATIENT)
Dept: ULTRASOUND IMAGING | Facility: CLINIC | Age: 36
Discharge: HOME | End: 2021-09-09
Attending: OBSTETRICS & GYNECOLOGY | Admitting: OBSTETRICS & GYNECOLOGY
Payer: COMMERCIAL

## 2021-09-09 ENCOUNTER — PRENATAL OFFICE VISIT (OUTPATIENT)
Dept: OBGYN | Facility: CLINIC | Age: 36
End: 2021-09-09
Payer: COMMERCIAL

## 2021-09-09 DIAGNOSIS — O20.0 THREATENED ABORTION: Primary | ICD-10-CM

## 2021-09-09 DIAGNOSIS — O20.0 THREATENED ABORTION: ICD-10-CM

## 2021-09-09 PROCEDURE — 76801 OB US < 14 WKS SINGLE FETUS: CPT | Mod: 26 | Performed by: RADIOLOGY

## 2021-09-09 PROCEDURE — 76801 OB US < 14 WKS SINGLE FETUS: CPT

## 2021-09-09 PROCEDURE — 99213 OFFICE O/P EST LOW 20 MIN: CPT | Mod: TEL | Performed by: OBSTETRICS & GYNECOLOGY

## 2021-09-09 NOTE — PROGRESS NOTES
This visit was conducted via phone due to covid 19 pandemic.    S; Arely Carranza is a 36 year old  at about 7 weeks who is here for repeat viability scan.  She had an us on  that showed a 6w0d IUP with small gestational sac with thickened walls and no yolk sac but a heartbeat.  Concern was for impending miscarriage and plan was for repeat today.  In the interim she has felt well, although pregnancy symptoms seemed to lessen.  She did have 2 episodes of dark spotting, but no red bleeding or pain.    Today, us shows live IUP measuring 7w0d.  Yolk sac seen, still with small gestational sac, measuring 5w6d.    O: no vitals    No formal report yet of us: but crl 7w0d, FHR 136bpm, no obvious MARITA seen anymore    A/P: IUP with threatened Ab   We discussed today's us and I explained that there has been appropriate growth since last scan. I spoke with Merry Will from [facility] and she did not think the small sac size was an indication of anueploidy and wasn't sure what the clinical significance would be outside of likely increased risk of miscarriage.  We discussed continuing to monitor closely and Arely agrees.  Will plan to repeat us with visit in 1-2wks.    DANIEL WHITLEY MD      Total phone time: 6 minutes

## 2021-09-23 ENCOUNTER — VIRTUAL VISIT (OUTPATIENT)
Dept: OBGYN | Facility: CLINIC | Age: 36
End: 2021-09-23
Payer: COMMERCIAL

## 2021-09-23 ENCOUNTER — ANCILLARY PROCEDURE (OUTPATIENT)
Dept: ULTRASOUND IMAGING | Facility: CLINIC | Age: 36
End: 2021-09-23
Attending: OBSTETRICS & GYNECOLOGY
Payer: COMMERCIAL

## 2021-09-23 DIAGNOSIS — O20.0 THREATENED ABORTION: ICD-10-CM

## 2021-09-23 DIAGNOSIS — O02.1 MISSED ABORTION: Primary | ICD-10-CM

## 2021-09-23 PROCEDURE — 99213 OFFICE O/P EST LOW 20 MIN: CPT | Mod: TEL | Performed by: OBSTETRICS & GYNECOLOGY

## 2021-09-23 PROCEDURE — 76801 OB US < 14 WKS SINGLE FETUS: CPT | Performed by: OBSTETRICS & GYNECOLOGY

## 2021-09-23 RX ORDER — ONDANSETRON 4 MG/1
4 TABLET, ORALLY DISINTEGRATING ORAL EVERY 6 HOURS PRN
Qty: 8 TABLET | Refills: 0 | Status: SHIPPED | OUTPATIENT
Start: 2021-09-23 | End: 2022-01-26

## 2021-09-23 RX ORDER — MISOPROSTOL 200 UG/1
800 TABLET ORAL 2 TIMES DAILY
Qty: 8 TABLET | Refills: 0 | Status: SHIPPED | OUTPATIENT
Start: 2021-09-23 | End: 2022-01-26

## 2021-09-23 NOTE — PROGRESS NOTES
Arely is a 36 year old who is being evaluated via a billable telephone visit.      What phone number would you like to be contacted at? [phone]  How would you like to obtain your AVS? Justin Carranza is a 36 year old  who has been followed for early pregnant with small gestational sac and concern for increased risk of miscarriage.  She reports feeling well, no pain or bleeding.  Unfortunately ultrasound today showed a 7w 4d IUP with no cardiac activity.  She is disappointed and sad, but understanding and appropriate.    O: There were no vitals taken for this visit.    No exam  U/S: 7w4d CRL, no FCA    A/P: Missed Ab   We reviewed the u/s and findings.  I reassured her she did not cause this to happen.  We discussed options of expt mgmt vs miso vs D&C.  We reviewed risks/benefits and side effects of all options.  A brief description of D&C given as well.  She desires miso to begin.   We discussed the process of taking the miso and typical symptoms and scenarios for spontaneous miscarriage.  We discussed high likelihood of success, but that a small percentage do not complete the miscarriage and that it can result in ER visit and emergent D&C for incomplete passage, heavy bleeding or pain. They verbalized understanding and questions were answered. rx faxed.    DANIEL WHITLEY MD      Phone call duration: 15 minutes

## 2021-10-04 ENCOUNTER — LAB (OUTPATIENT)
Dept: LAB | Facility: CLINIC | Age: 36
End: 2021-10-04
Payer: COMMERCIAL

## 2021-10-04 DIAGNOSIS — O03.9 MISCARRIAGE: ICD-10-CM

## 2021-10-04 LAB
ERYTHROCYTE [DISTWIDTH] IN BLOOD BY AUTOMATED COUNT: 12.7 % (ref 10–15)
HCT VFR BLD AUTO: 36.8 % (ref 35–47)
HGB BLD-MCNC: 12.2 G/DL (ref 11.7–15.7)
MCH RBC QN AUTO: 29.5 PG (ref 26.5–33)
MCHC RBC AUTO-ENTMCNC: 33.2 G/DL (ref 31.5–36.5)
MCV RBC AUTO: 89 FL (ref 78–100)
PLATELET # BLD AUTO: 154 10E3/UL (ref 150–450)
RBC # BLD AUTO: 4.13 10E6/UL (ref 3.8–5.2)
WBC # BLD AUTO: 5.6 10E3/UL (ref 4–11)

## 2021-10-04 PROCEDURE — 85027 COMPLETE CBC AUTOMATED: CPT

## 2021-10-04 PROCEDURE — 36415 COLL VENOUS BLD VENIPUNCTURE: CPT

## 2021-10-10 ENCOUNTER — HEALTH MAINTENANCE LETTER (OUTPATIENT)
Age: 36
End: 2021-10-10

## 2021-12-20 NOTE — PROGRESS NOTES
History of Present Illness - Arely Carranza is a very pleasant 36 year old female here to see me for the first time for throat/neck symptoms.  She is an anesthesiologist at [employer]    She tells me about 10 months ago she started to have issues with some swelling of the RIGHT submandibular gland.  She denies pain, but it just felt more prominent.  But then about 2 months ago she started to note that she started feeling more fullness in the entire RIGHT side of the neck.  There is still no pain, just fullness.    No changes in eating or drinking, no hoarseness or change in voice, no hemoptysis.  No chest pain or change in exercise tolerance.    Past Medical History -   Patient Active Problem List   Diagnosis     Mild intermittent asthma     Need for Tdap vaccination     History of vacuum extraction assisted delivery     History of shoulder dystocia in prior pregnancy     Rh negative state in antepartum period     Indication for care in labor or delivery      (normal spontaneous vaginal delivery)       Current Medications -   Current Outpatient Medications:      albuterol (PROAIR HFA/PROVENTIL HFA/VENTOLIN HFA) 108 (90 Base) MCG/ACT inhaler, Inhale 2 puffs into the lungs every 6 hours as needed for shortness of breath / dyspnea or wheezing, Disp: 1 Inhaler, Rfl: 3     loratadine (CLARITIN) 10 MG tablet, Take 10 mg by mouth daily, Disp: , Rfl:      misoprostol (CYTOTEC) 200 MCG tablet, Place 4 tablets (800 mcg) vaginally 2 times daily Place second 4 tabs 12 hours after first if miscarriage hasn't completed, Disp: 8 tablet, Rfl: 0     ondansetron (ZOFRAN-ODT) 4 MG ODT tab, Take 1 tablet (4 mg) by mouth every 6 hours as needed for nausea, Disp: 8 tablet, Rfl: 0     Prenatal Vit-Fe Fumarate-FA (PRENATAL MULTIVITAMIN W/IRON) 27-0.8 MG tablet, Take 1 tablet by mouth daily, Disp: , Rfl:   No current facility-administered medications for this visit.    Facility-Administered Medications Ordered in Other Visits:       bupivacaine (MARCAINE) 0.125 % injection (diluted from stock concentration by MD or CRNA), , EPIDURAL, PRN, Maycol Greene MD, 8 mL at 06/24/20 1459     fentaNYL (SUBLIMAZE) 2 mcg/mL, bupivacaine (MARCAINE) 0.125% in NaCl 0.9% EPIDURAL infusion, , EPIDURAL, Continuous PRN, Maycol Greene MD, Last Rate: 5 mL/hr at 06/24/20 1501, 5 mL/hr at 06/24/20 1501     lidocaine-EPINEPHrine 1.5 %-[id number] injection, , EPIDURAL, PRN, Maycol Greene MD, 3 mL at 06/24/20 1456    Allergies -   Allergies   Allergen Reactions     Penicillins      Sulfa Drugs        Social History -   Social History     Socioeconomic History     Marital status:      Spouse name: Not on file     Number of children: Not on file     Years of education: Not on file     Highest education level: Not on file   Occupational History     Not on file   Tobacco Use     Smoking status: Never Smoker     Smokeless tobacco: Never Used   Substance and Sexual Activity     Alcohol use: Not Currently     Drug use: Never     Sexual activity: Yes     Partners: Male   Other Topics Concern     Not on file   Social History Narrative     Not on file     Social Determinants of Health     Financial Resource Strain: Not on file   Food Insecurity: Not on file   Transportation Needs: Not on file   Physical Activity: Not on file   Stress: Not on file   Social Connections: Not on file   Intimate Partner Violence: Not on file   Housing Stability: Not on file       Family History -   Family History   Problem Relation Age of Onset     Ovarian Cancer Mother      Hypertension Maternal Grandmother      Glaucoma Maternal Grandmother      Macular Degeneration Maternal Grandmother        Review of Systems - As per HPI and PMHx, otherwise 10+ system review of the head and neck, and general constitution is negative.    Physical Exam  /74   Pulse 78   Resp 16   SpO2 99%     General - The patient is well nourished and well developed, and appears to have good nutritional status.   Alert and oriented to person and place, answers questions and cooperates with examination appropriately.   Head and Face - Normocephalic and atraumatic, with no gross asymmetry noted of the contour of the facial features.  The facial nerve is intact, with strong symmetric movements.  Voice and Breathing - The patient was breathing comfortably without the use of accessory muscles. There was no wheezing, stridor, or stertor.  The patients voice was clear and strong, and had appropriate pitch and quality.  Ears - The tympanic membranes are normal in appearance, bony landmarks are intact.  No retraction, perforation, or masses.  No fluid or purulence was seen in the external canal or the middle ear. No evidence of infection of the middle ear or external canal, cerumen was normal in appearance.  Eyes - Extraocular movements intact, and the pupils were reactive to light.  Sclera were not icteric or injected, conjunctiva were pink and moist.  Mouth - Examination of the oral cavity showed pink, healthy oral mucosa. No lesions or ulcerations noted.  The tongue was mobile and midline, and the dentition were in good condition.    Throat - The walls of the oropharynx were smooth, pink, moist, symmetric, and had no lesions or ulcerations.  The tonsillar pillars and soft palate were symmetric.  The uvula was midline on elevation.    Neck - Normal midline excursion of the laryngotracheal complex during swallowing.  Full range of motion on passive movement.  There is an area of fullness just lateral to the edge of the RIGHT hyoid.  Mobile and nontender. Otherwise, palpation of the occipital, submental, submandibular, internal jugular chain, and supraclavicular nodes did not demonstrate any abnormal lymph nodes or masses.  The carotid pulse was palpable bilaterally.  Palpation of the thyroid was soft and smooth, with no nodules or goiter appreciated.  The trachea was mobile and midline.  Nose - External contour is symmetric, no  gross deflection or scars.  Nasal mucosa is pink and moist with no abnormal mucus.  The septum was midline and non-obstructive, turbinates of normal size and position.  No polyps, masses, or purulence noted on examination.    Flexible Endoscopy -     Attempts at mirror laryngoscopy were not possible due to gag reflex.  Therefore I proceeded with a fiberoptic examination.  First I sprayed both sides of the nose with a mixture of lidocaine and neosynephrine.  I then passed the scope through the nasal cavity.  Color photographs were taken for the permanent medical record.  The nasal cavity was unremarkable.  The nasopharynx was mucosally covered and symmetric.  The Eustachian tube openings were unobstructed.  Going further down I had a clear view of the base of tongue which had normal appearing lingual tonsillar tissue.  The base of tongue was free of lesions, and the vallecula was open.  The epiglottis was smooth and mucosally covered.  The supraglottic larynx was then clearly visualized.  The vocal cords moved smoothly and symmetrically, they were pearly white and no lesions were seen.  The pyriform sinuses were open, and the limited view of the postcricoid region did not show any lesions.        A/P - Arely Carranza is a 36 year old female  (M54.2) Neck pain on right side  (primary encounter diagnosis)    My diagnostic considerations include temporomandibular disease, laryngopharyngeal reflux, and possibly chronic low grade RIGHT submandibular sialoadenitis.    I am going to order a neck CT due to the fullness I feel on the RIGHT side, and call her with results.    If normal, the first thing I would try is a month of omeprazole

## 2021-12-27 ENCOUNTER — OFFICE VISIT (OUTPATIENT)
Dept: OTOLARYNGOLOGY | Facility: CLINIC | Age: 36
End: 2021-12-27
Payer: COMMERCIAL

## 2021-12-27 VITALS
DIASTOLIC BLOOD PRESSURE: 74 MMHG | SYSTOLIC BLOOD PRESSURE: 117 MMHG | OXYGEN SATURATION: 99 % | HEART RATE: 78 BPM | RESPIRATION RATE: 16 BRPM

## 2021-12-27 DIAGNOSIS — R22.1 MASS OF RIGHT SIDE OF NECK: ICD-10-CM

## 2021-12-27 DIAGNOSIS — M54.2 NECK PAIN ON RIGHT SIDE: Primary | ICD-10-CM

## 2021-12-27 PROCEDURE — 99203 OFFICE O/P NEW LOW 30 MIN: CPT | Mod: 25 | Performed by: OTOLARYNGOLOGY

## 2021-12-27 PROCEDURE — 31575 DIAGNOSTIC LARYNGOSCOPY: CPT | Performed by: OTOLARYNGOLOGY

## 2021-12-27 NOTE — LETTER
2021         RE: Arely Carranza  [address] [place] [id number]        Dear Colleague,    Thank you for referring your patient, Arely Carranza, to the [facility]. Please see a copy of my visit note below.    History of Present Illness - Arely Carranza is a very pleasant 36 year old female here to see me for the first time for throat/neck symptoms.  She is an anesthesiologist at [employer]    She tells me about 10 months ago she started to have issues with some swelling of the RIGHT submandibular gland.  She denies pain, but it just felt more prominent.  But then about 2 months ago she started to note that she started feeling more fullness in the entire RIGHT side of the neck.  There is still no pain, just fullness.    No changes in eating or drinking, no hoarseness or change in voice, no hemoptysis.  No chest pain or change in exercise tolerance.    Past Medical History -   Patient Active Problem List   Diagnosis     Mild intermittent asthma     Need for Tdap vaccination     History of vacuum extraction assisted delivery     History of shoulder dystocia in prior pregnancy     Rh negative state in antepartum period     Indication for care in labor or delivery      (normal spontaneous vaginal delivery)       Current Medications -   Current Outpatient Medications:      albuterol (PROAIR HFA/PROVENTIL HFA/VENTOLIN HFA) 108 (90 Base) MCG/ACT inhaler, Inhale 2 puffs into the lungs every 6 hours as needed for shortness of breath / dyspnea or wheezing, Disp: 1 Inhaler, Rfl: 3     loratadine (CLARITIN) 10 MG tablet, Take 10 mg by mouth daily, Disp: , Rfl:      misoprostol (CYTOTEC) 200 MCG tablet, Place 4 tablets (800 mcg) vaginally 2 times daily Place second 4 tabs 12 hours after first if miscarriage hasn't completed, Disp: 8 tablet, Rfl: 0     ondansetron (ZOFRAN-ODT) 4 MG ODT tab, Take 1 tablet (4 mg) by mouth every 6 hours as needed for nausea, Disp: 8  tablet, Rfl: 0     Prenatal Vit-Fe Fumarate-FA (PRENATAL MULTIVITAMIN W/IRON) 27-0.8 MG tablet, Take 1 tablet by mouth daily, Disp: , Rfl:   No current facility-administered medications for this visit.    Facility-Administered Medications Ordered in Other Visits:      bupivacaine (MARCAINE) 0.125 % injection (diluted from stock concentration by MD or CRNA), , EPIDURAL, PRN, Maycol Greene MD, 8 mL at 06/24/20 1459     fentaNYL (SUBLIMAZE) 2 mcg/mL, bupivacaine (MARCAINE) 0.125% in NaCl 0.9% EPIDURAL infusion, , EPIDURAL, Continuous PRN, Maycol Greene MD, Last Rate: 5 mL/hr at 06/24/20 1501, 5 mL/hr at 06/24/20 1501     lidocaine-EPINEPHrine 1.5 %-1:[id number] injection, , EPIDURAL, PRN, Maycol Greene MD, 3 mL at 06/24/20 1456    Allergies -   Allergies   Allergen Reactions     Penicillins      Sulfa Drugs        Social History -   Social History     Socioeconomic History     Marital status:      Spouse name: Not on file     Number of children: Not on file     Years of education: Not on file     Highest education level: Not on file   Occupational History     Not on file   Tobacco Use     Smoking status: Never Smoker     Smokeless tobacco: Never Used   Substance and Sexual Activity     Alcohol use: Not Currently     Drug use: Never     Sexual activity: Yes     Partners: Male   Other Topics Concern     Not on file   Social History Narrative     Not on file     Social Determinants of Health     Financial Resource Strain: Not on file   Food Insecurity: Not on file   Transportation Needs: Not on file   Physical Activity: Not on file   Stress: Not on file   Social Connections: Not on file   Intimate Partner Violence: Not on file   Housing Stability: Not on file       Family History -   Family History   Problem Relation Age of Onset     Ovarian Cancer Mother      Hypertension Maternal Grandmother      Glaucoma Maternal Grandmother      Macular Degeneration Maternal Grandmother        Review of Systems - As per HPI and  PMHx, otherwise 10+ system review of the head and neck, and general constitution is negative.    Physical Exam  /74   Pulse 78   Resp 16   SpO2 99%     General - The patient is well nourished and well developed, and appears to have good nutritional status.  Alert and oriented to person and place, answers questions and cooperates with examination appropriately.   Head and Face - Normocephalic and atraumatic, with no gross asymmetry noted of the contour of the facial features.  The facial nerve is intact, with strong symmetric movements.  Voice and Breathing - The patient was breathing comfortably without the use of accessory muscles. There was no wheezing, stridor, or stertor.  The patients voice was clear and strong, and had appropriate pitch and quality.  Ears - The tympanic membranes are normal in appearance, bony landmarks are intact.  No retraction, perforation, or masses.  No fluid or purulence was seen in the external canal or the middle ear. No evidence of infection of the middle ear or external canal, cerumen was normal in appearance.  Eyes - Extraocular movements intact, and the pupils were reactive to light.  Sclera were not icteric or injected, conjunctiva were pink and moist.  Mouth - Examination of the oral cavity showed pink, healthy oral mucosa. No lesions or ulcerations noted.  The tongue was mobile and midline, and the dentition were in good condition.    Throat - The walls of the oropharynx were smooth, pink, moist, symmetric, and had no lesions or ulcerations.  The tonsillar pillars and soft palate were symmetric.  The uvula was midline on elevation.    Neck - Normal midline excursion of the laryngotracheal complex during swallowing.  Full range of motion on passive movement.  There is an area of fullness just lateral to the edge of the RIGHT hyoid.  Mobile and nontender. Otherwise, palpation of the occipital, submental, submandibular, internal jugular chain, and supraclavicular nodes did  not demonstrate any abnormal lymph nodes or masses.  The carotid pulse was palpable bilaterally.  Palpation of the thyroid was soft and smooth, with no nodules or goiter appreciated.  The trachea was mobile and midline.  Nose - External contour is symmetric, no gross deflection or scars.  Nasal mucosa is pink and moist with no abnormal mucus.  The septum was midline and non-obstructive, turbinates of normal size and position.  No polyps, masses, or purulence noted on examination.    Flexible Endoscopy -     Attempts at mirror laryngoscopy were not possible due to gag reflex.  Therefore I proceeded with a fiberoptic examination.  First I sprayed both sides of the nose with a mixture of lidocaine and neosynephrine.  I then passed the scope through the nasal cavity.  Color photographs were taken for the permanent medical record.  The nasal cavity was unremarkable.  The nasopharynx was mucosally covered and symmetric.  The Eustachian tube openings were unobstructed.  Going further down I had a clear view of the base of tongue which had normal appearing lingual tonsillar tissue.  The base of tongue was free of lesions, and the vallecula was open.  The epiglottis was smooth and mucosally covered.  The supraglottic larynx was then clearly visualized.  The vocal cords moved smoothly and symmetrically, they were pearly white and no lesions were seen.  The pyriform sinuses were open, and the limited view of the postcricoid region did not show any lesions.        A/P - Arely Carranza is a 36 year old female  (M54.2) Neck pain on right side  (primary encounter diagnosis)    My diagnostic considerations include temporomandibular disease, laryngopharyngeal reflux, and possibly chronic low grade RIGHT submandibular sialoadenitis.    I am going to order a neck CT due to the fullness I feel on the RIGHT side, and call her with results.    If normal, the first thing I would try is a month of omeprazole        Again, thank you  for allowing me to participate in the care of your patient.        Sincerely,        Bruce Solano MD

## 2021-12-28 ENCOUNTER — HOSPITAL ENCOUNTER (OUTPATIENT)
Dept: CT IMAGING | Facility: CLINIC | Age: 36
Discharge: HOME | End: 2021-12-28
Attending: OTOLARYNGOLOGY | Admitting: OTOLARYNGOLOGY
Payer: COMMERCIAL

## 2021-12-28 DIAGNOSIS — M54.2 NECK PAIN ON RIGHT SIDE: ICD-10-CM

## 2021-12-28 DIAGNOSIS — R22.1 MASS OF RIGHT SIDE OF NECK: ICD-10-CM

## 2021-12-28 PROCEDURE — 70491 CT SOFT TISSUE NECK W/DYE: CPT | Mod: 26 | Performed by: RADIOLOGY

## 2021-12-28 PROCEDURE — 250N000009 HC RX 250: Performed by: OTOLARYNGOLOGY

## 2021-12-28 PROCEDURE — 250N000011 HC RX IP 250 OP 636: Performed by: OTOLARYNGOLOGY

## 2021-12-28 PROCEDURE — 70491 CT SOFT TISSUE NECK W/DYE: CPT

## 2021-12-28 RX ORDER — IOPAMIDOL 755 MG/ML
100 INJECTION, SOLUTION INTRAVASCULAR ONCE
Status: COMPLETED | OUTPATIENT
Start: 2021-12-28 | End: 2021-12-28

## 2021-12-28 RX ADMIN — SODIUM CHLORIDE 80 ML: 9 INJECTION, SOLUTION INTRAVENOUS at 16:32

## 2021-12-28 RX ADMIN — IOPAMIDOL 100 ML: 755 INJECTION, SOLUTION INTRAVENOUS at 16:31

## 2022-01-18 PROCEDURE — U0005 INFEC AGEN DETEC AMPLI PROBE: HCPCS | Performed by: INTERNAL MEDICINE

## 2022-01-19 ENCOUNTER — LAB REQUISITION (OUTPATIENT)
Dept: LAB | Facility: CLINIC | Age: 37
End: 2022-01-19

## 2022-01-19 LAB — SARS-COV-2 RNA RESP QL NAA+PROBE: NEGATIVE

## 2022-01-26 ENCOUNTER — PRENATAL OFFICE VISIT (OUTPATIENT)
Dept: NURSING | Facility: CLINIC | Age: 37
End: 2022-01-26
Payer: COMMERCIAL

## 2022-01-26 ENCOUNTER — TRANSCRIBE ORDERS (OUTPATIENT)
Dept: MATERNAL FETAL MEDICINE | Facility: CLINIC | Age: 37
End: 2022-01-26

## 2022-01-26 VITALS — HEIGHT: 66 IN | BODY MASS INDEX: 19.29 KG/M2 | WEIGHT: 120 LBS

## 2022-01-26 DIAGNOSIS — O09.529 ENCOUNTER FOR SUPERVISION OF MULTIGRAVIDA WITH ADVANCED MATERNAL AGE: Primary | ICD-10-CM

## 2022-01-26 DIAGNOSIS — Z23 NEED FOR TDAP VACCINATION: ICD-10-CM

## 2022-01-26 DIAGNOSIS — O26.90 PREGNANCY RELATED CONDITION, ANTEPARTUM: Primary | ICD-10-CM

## 2022-01-26 PROBLEM — Z87.59 HISTORY OF VACUUM EXTRACTION ASSISTED DELIVERY: Status: RESOLVED | Noted: 2019-11-26 | Resolved: 2022-01-26

## 2022-01-26 PROCEDURE — 99207 PR NO CHARGE NURSE ONLY: CPT

## 2022-01-26 ASSESSMENT — MIFFLIN-ST. JEOR: SCORE: 1256.07

## 2022-01-26 NOTE — PROGRESS NOTES
Important Information for Provider:     New ob nurse intake by phone, fourth pregnancy, AMA. Recent SAB 9/2021. Ultrasound scheduled for 2/11/2022 with Dr Franklin to review results. NOB with Dr Csae 2/23/2022. Ordered  genetic screening     Prenatal OB Questionnaire  Patient supplied answers from flow sheet for:  Prenatal OB Questionnaire.  Past Medical History  Have you ever recieved care for your mental health? : No  Have you ever been in a major accident or suffered serious trauma?: No  Within the last year, has anyone hit, slapped, kicked or otherwise hurt you?: No  In the last year, has anyone forced you to have sex when you didn't want to?: No    Past Medical History 2   Have you ever received a blood transfusion?: No  Would you accept a blood transfusion if was medically recommended?: Yes  Does anyone in your home smoke?: No   Is your blood type Rh negative?: (!) Yes  Have you ever ?: (!) Yes  Have you been hospitalized for a nonsurgical reason excluding normal delivery?: No  Have you ever had an abnormal pap smear?: No    Past Medical History (Continued)  Do you have a history of abnormalities of the uterus?: No  Did your mother take KAYLEY or any other hormones when she was pregnant with you?: No  Do you have any other problems we have not asked about which you feel may be important to this pregnancy?: No                     Allergies as of 1/26/2022:    Allergies as of 01/26/2022 - Reviewed 01/26/2022   Allergen Reaction Noted     Sulfa drugs  08/02/2012     Penicillins Other (See Comments) and Rash 02/11/2014       Current medications are:  Current Outpatient Medications   Medication Sig Dispense Refill     albuterol (PROAIR HFA/PROVENTIL HFA/VENTOLIN HFA) 108 (90 Base) MCG/ACT inhaler Inhale 2 puffs into the lungs every 6 hours as needed for shortness of breath / dyspnea or wheezing 1 Inhaler 3     loratadine (CLARITIN) 10 MG tablet Take 10 mg by mouth daily       Prenatal Vit-Fe Fumarate-FA  (PRENATAL MULTIVITAMIN W/IRON) 27-0.8 MG tablet Take 1 tablet by mouth daily           Early ultrasound screening tool:    Does patient have irregular periods?  No  Did patient use hormonal birth control in the three months prior to positive urine pregnancy test? No  Is the patient breastfeeding?  No  Is the patient 10 weeks or greater at time of education visit?

## 2022-02-08 DIAGNOSIS — Z34.80 SUPERVISION OF OTHER NORMAL PREGNANCY, ANTEPARTUM: Primary | ICD-10-CM

## 2022-02-11 ENCOUNTER — OFFICE VISIT (OUTPATIENT)
Dept: OBGYN | Facility: CLINIC | Age: 37
End: 2022-02-11
Payer: COMMERCIAL

## 2022-02-11 ENCOUNTER — ANCILLARY PROCEDURE (OUTPATIENT)
Dept: ULTRASOUND IMAGING | Facility: CLINIC | Age: 37
End: 2022-02-11
Attending: OBSTETRICS & GYNECOLOGY
Payer: COMMERCIAL

## 2022-02-11 VITALS
TEMPERATURE: 98.1 F | DIASTOLIC BLOOD PRESSURE: 67 MMHG | WEIGHT: 125.6 LBS | BODY MASS INDEX: 20.27 KG/M2 | HEART RATE: 66 BPM | SYSTOLIC BLOOD PRESSURE: 105 MMHG | OXYGEN SATURATION: 99 %

## 2022-02-11 DIAGNOSIS — Z34.90 EARLY STAGE OF PREGNANCY: Primary | ICD-10-CM

## 2022-02-11 DIAGNOSIS — O09.529 ENCOUNTER FOR SUPERVISION OF MULTIGRAVIDA WITH ADVANCED MATERNAL AGE: ICD-10-CM

## 2022-02-11 PROCEDURE — 76801 OB US < 14 WKS SINGLE FETUS: CPT | Performed by: OBSTETRICS & GYNECOLOGY

## 2022-02-11 PROCEDURE — 99212 OFFICE O/P EST SF 10 MIN: CPT | Performed by: OBSTETRICS & GYNECOLOGY

## 2022-02-11 NOTE — PROGRESS NOTES
OB Progress Note     CC: F/U ultrasound for dating and viability     HPI: Arely Elena is a 36 year old  at 8w2d by LMP who presents today for a dating and viability ultrasound. Since her LMP she reports that she has  been having mild nausea, not requiring any treatment. She denies vaginal bleeding or abdominal pain. She denies any concerns today.     O: /67   Pulse 66   Temp 98.1  F (36.7  C)   Wt 57 kg (125 lb 9.6 oz)   LMP 12/15/2021   SpO2 99%   Breastfeeding No   BMI 20.27 kg/m       Ultrasound  Final report pending  I personally reviewed the US images which show a weir IUP measuring 8w5d by CRL and FHR of 161.     Assessment and plan:   Arely Elena is a 36 year old  at 8w2d by LMP who presents for a dating and viability ultrasound   -We reviewed her ultrasound results which documented a weir IUP with fetal cardiac activity. Her ultrasound dating is  consistent with her LMP and her Estimated Date of Delivery: Sep 21, 2022   -She is currently taking a prenatal vitamin  -Warning signs of SAB and when to call/contact the clinic reviewed     Dispo: RTC in 2 weeks for initial prenatal visit or sooner KAUR Franklin MD

## 2022-02-21 ENCOUNTER — PRE VISIT (OUTPATIENT)
Dept: MATERNAL FETAL MEDICINE | Facility: CLINIC | Age: 37
End: 2022-02-21
Payer: COMMERCIAL

## 2022-02-22 NOTE — PROGRESS NOTES
[facility]etal Medicine [place]  Genetic Counseling Consult    Patient: Arely Elena YOB: 1985   Date of Service: 22      Arely Elena was seen at [facility] for genetic consultation to discuss the options for screening and testing for fetal chromosome abnormalities. The indication for genetic counseling is advanced maternal age. The patient was unaccompanied to today's visit.        Impression/Plan:   1.  Arely had a genetic counseling consultation today. She has elected to pursue NIPT through [email or web address] and is scheduled to return for NT ultrasound on 22. NIPT results are expected within 7-10 days, and will be available in [place]. We will contact her to discuss the results, and a copy will be forwarded to the office of the referring OB provider. Arely requested that we do not leave results in her voicemail. She would like sex of information placed in an envelope at [facility] .     2.  Maternal serum AFP (single marker screen) is recommended after 15 weeks to screen for open neural tube defects. A quad screen should not be performed.    3.  An 18-20 week comprehensive ultrasound is standard of care for all women 35 or older at delivery.    Pregnancy History:   /Parity:    Age at Delivery: 37 year old  GHASSAN: 2022, by Last Menstrual Period  Gestational Age: 9w6d    No significant complications or exposures were reported in the current pregnancy.    Arely ahsraf pregnancy history is significant for two term deliveries and one SAB.    Medical History:   Arely ashraf reported medical history is not expected to impact pregnancy management or risks to fetal development.       Family History:   The couple's family history was discussed in her previous pregnancy, see note from 19. No new significant findings were reported by Arely today.        Carrier Screening:       Expanded carrier screening for mutations in a  large panel of genes associated with autosomal recessive conditions including cystic fibrosis, spinal muscular atrophy, and others, is now available.      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 Arely shared that her father has a history of Gilbert syndrome and therefore she expects she is also a carrier of this condition. Arely shared that she is not interested in pursuing any additional carrier testing for her parnter related to these conditions. We reviewed limitations of carrier testing by genotyping platform and availability of clinical comprehensive expanded carrier screening panels. She was provided with a brochure about her testing options, and contact information if she has questions or wishes to pursue screening.       Risk Assessment for Chromosome Conditions:   We explained that the risk for fetal chromosome abnormalities increases with maternal age. We discussed specific features of common chromosome abnormalities, including Down syndrome, trisomy 13, trisomy 18, and sex chromosome trisomies.      - At age 37 at midtrimester, the risk to have a baby with Down syndrome is 1 in 216.     - At age 37 at midtrimester, the risk to have a baby with any chromosome abnormality is 1 in 105.     Testing Options:   We discussed the following options:   Non-invasive Prenatal Testing (NIPT)    Maternal plasma cell-free DNA testing; first trimester ultrasound with nuchal translucency and nasal bone assessment is recommended, when appropriate    Screens for fetal trisomy 21, trisomy 13, trisomy 18, and sex chromosome aneuploidy    Cannot screen for open neural tube defects; maternal serum AFP after 15 weeks is recommended     Chorionic villus sampling (CVS)    Invasive procedure typically performed in the first trimester by which placental villi are obtained for the purpose of chromosome analysis and/or other prenatal genetic  analysis    Diagnostic results; >99% sensitivity for fetal chromosome abnormalities    Cannot test for open neural tube defects; maternal serum AFP after 15 weeks is recommended     Genetic Amniocentesis    Invasive procedure typically performed in the second trimester by which amniotic fluid is obtained for the purpose of chromosome analysis and/or other prenatal genetic analysis    Diagnostic results; >99% sensitivity for fetal chromosome abnormalities    AFAFP measurement tests for open neural tube defects     Comprehensive (Level II) ultrasound: Detailed ultrasound performed between 18-22 weeks gestation to screen for major birth defects and markers for aneuploidy.      We reviewed the benefits and limitations of this testing.  Screening tests provide a risk assessment specific to the pregnancy for certain fetal chromosome abnormalities, but cannot definitively diagnose or exclude a fetal chromosome abnormality.  Follow-up genetic counseling and consideration of diagnostic testing is recommended with any abnormal screening result.     Diagnostic tests carry inherent risks- including risk of miscarriage- that require careful consideration.  These tests can detect fetal chromosome abnormalities with greater than 99% certainty.  Results can be compromised by maternal cell contamination or mosaicism, and are limited by the resolution of cytogenetic G-banding technology.  There is no screening nor diagnostic test that can detect all forms of birth defects or mental disability.     It was a pleasure to be involved with Arely [facility]. Face-to-face time of the meeting was 15 minutes.    Audra Barbosa MS, [facility]  Licensed Genetic Counselor  [facility]  Maternal Fetal Medicine  Ph: [phone]  [email or web address]

## 2022-02-23 ENCOUNTER — LAB (OUTPATIENT)
Dept: LAB | Facility: CLINIC | Age: 37
End: 2022-02-23
Attending: OBSTETRICS & GYNECOLOGY
Payer: COMMERCIAL

## 2022-02-23 ENCOUNTER — PRENATAL OFFICE VISIT (OUTPATIENT)
Dept: OBGYN | Facility: CLINIC | Age: 37
End: 2022-02-23
Payer: COMMERCIAL

## 2022-02-23 ENCOUNTER — OFFICE VISIT (OUTPATIENT)
Dept: MATERNAL FETAL MEDICINE | Facility: CLINIC | Age: 37
End: 2022-02-23
Attending: OBSTETRICS & GYNECOLOGY
Payer: COMMERCIAL

## 2022-02-23 VITALS
BODY MASS INDEX: 20.13 KG/M2 | HEART RATE: 73 BPM | DIASTOLIC BLOOD PRESSURE: 59 MMHG | SYSTOLIC BLOOD PRESSURE: 106 MMHG | WEIGHT: 124.7 LBS

## 2022-02-23 DIAGNOSIS — Z34.81 ENCOUNTER FOR SUPERVISION OF OTHER NORMAL PREGNANCY, FIRST TRIMESTER: Primary | ICD-10-CM

## 2022-02-23 DIAGNOSIS — O09.521 MULTIGRAVIDA OF ADVANCED MATERNAL AGE IN FIRST TRIMESTER: Primary | ICD-10-CM

## 2022-02-23 DIAGNOSIS — O26.90 PREGNANCY RELATED CONDITION, ANTEPARTUM: ICD-10-CM

## 2022-02-23 DIAGNOSIS — O09.521 MULTIGRAVIDA OF ADVANCED MATERNAL AGE IN FIRST TRIMESTER: ICD-10-CM

## 2022-02-23 DIAGNOSIS — O09.529 ENCOUNTER FOR SUPERVISION OF MULTIGRAVIDA WITH ADVANCED MATERNAL AGE: ICD-10-CM

## 2022-02-23 LAB
ABO/RH(D): NORMAL
ALBUMIN UR-MCNC: NEGATIVE MG/DL
ANTIBODY SCREEN: NEGATIVE
APPEARANCE UR: CLEAR
BILIRUB UR QL STRIP: NEGATIVE
COLOR UR AUTO: YELLOW
ERYTHROCYTE [DISTWIDTH] IN BLOOD BY AUTOMATED COUNT: 13.1 % (ref 10–15)
GLUCOSE UR STRIP-MCNC: NEGATIVE MG/DL
HBV SURFACE AG SERPL QL IA: NONREACTIVE
HCT VFR BLD AUTO: 41.8 % (ref 35–47)
HCV AB SERPL QL IA: NONREACTIVE
HGB BLD-MCNC: 14 G/DL (ref 11.7–15.7)
HGB UR QL STRIP: NEGATIVE
HIV 1+2 AB+HIV1 P24 AG SERPL QL IA: NONREACTIVE
KETONES UR STRIP-MCNC: NEGATIVE MG/DL
LEUKOCYTE ESTERASE UR QL STRIP: NEGATIVE
MCH RBC QN AUTO: 29.7 PG (ref 26.5–33)
MCHC RBC AUTO-ENTMCNC: 33.5 G/DL (ref 31.5–36.5)
MCV RBC AUTO: 89 FL (ref 78–100)
NITRATE UR QL: NEGATIVE
PH UR STRIP: 7.5 [PH] (ref 5–7)
PLATELET # BLD AUTO: 186 10E3/UL (ref 150–450)
RBC # BLD AUTO: 4.71 10E6/UL (ref 3.8–5.2)
SP GR UR STRIP: 1.02 (ref 1–1.03)
SPECIMEN EXPIRATION DATE: NORMAL
T PALLIDUM AB SER QL: NONREACTIVE
UROBILINOGEN UR STRIP-ACNC: 0.2 E.U./DL
WBC # BLD AUTO: 11.5 10E3/UL (ref 4–11)

## 2022-02-23 PROCEDURE — 87340 HEPATITIS B SURFACE AG IA: CPT

## 2022-02-23 PROCEDURE — 87086 URINE CULTURE/COLONY COUNT: CPT | Performed by: OBSTETRICS & GYNECOLOGY

## 2022-02-23 PROCEDURE — 86850 RBC ANTIBODY SCREEN: CPT

## 2022-02-23 PROCEDURE — 85027 COMPLETE CBC AUTOMATED: CPT

## 2022-02-23 PROCEDURE — 86803 HEPATITIS C AB TEST: CPT

## 2022-02-23 PROCEDURE — 87389 HIV-1 AG W/HIV-1&-2 AB AG IA: CPT

## 2022-02-23 PROCEDURE — 86901 BLOOD TYPING SEROLOGIC RH(D): CPT

## 2022-02-23 PROCEDURE — 999N000069 HC STATISTIC GENETIC COUNSELING, < 16 MIN: Performed by: GENETIC COUNSELOR, MS

## 2022-02-23 PROCEDURE — 81003 URINALYSIS AUTO W/O SCOPE: CPT | Performed by: OBSTETRICS & GYNECOLOGY

## 2022-02-23 PROCEDURE — 86780 TREPONEMA PALLIDUM: CPT

## 2022-02-23 PROCEDURE — 86762 RUBELLA ANTIBODY: CPT

## 2022-02-23 PROCEDURE — 99207 PR FIRST OB VISIT: CPT | Performed by: OBSTETRICS & GYNECOLOGY

## 2022-02-23 PROCEDURE — 36415 COLL VENOUS BLD VENIPUNCTURE: CPT

## 2022-02-23 NOTE — PROGRESS NOTES
CC: New Ob visit  HPI: Roque Elena is a 36 year old  here for new Ob visit.  Patient's last menstrual period was 12/15/2021..  She is 10w0d by known LMP and c/w 8w5d us, giving her an EDC of 22.  The pregnancy was planned and she and her  are feeling excited.    This far the pregnancy has been uneventful other than mild nausea.  She was in [place] the past 2 weeks and overall felt well.    Her previous pregnancies were uncomplicated, 2 , largest 8lb6oz    Past Medical History:   Diagnosis Date     Asthma      Varicella        Past Surgical History:   Procedure Laterality Date     FOOT SURGERY      cyst removal     wisdom tooth removal       OB History    Para Term  AB Living   4 2 2 0 1 2   SAB IAB Ectopic Multiple Live Births   1 0 0 0 2      # Outcome Date GA Lbr Keven/2nd Weight Sex Delivery Anes PTL Lv   4 Current            3 SAB 2021           2 Term 20 39w0d 06:20 / 00:15 3.118 kg (6 lb 14 oz) M Vag-Spont EPI N ODILON      Name: LANA PERALTA,MALE-ROQUE      Apgar1: 9  Apgar5: 9   1 Term 16 40w6d  3.799 kg (8 lb 6 oz) M Vag-Vacuum EPI N ODILON           Current Outpatient Medications:      albuterol (PROAIR HFA/PROVENTIL HFA/VENTOLIN HFA) 108 (90 Base) MCG/ACT inhaler, Inhale 2 puffs into the lungs every 6 hours as needed for shortness of breath / dyspnea or wheezing, Disp: 1 Inhaler, Rfl: 3     loratadine (CLARITIN) 10 MG tablet, Take 10 mg by mouth daily, Disp: , Rfl:      Prenatal Vit-Fe Fumarate-FA (PRENATAL MULTIVITAMIN W/IRON) 27-0.8 MG tablet, Take 1 tablet by mouth daily, Disp: , Rfl:   No current facility-administered medications for this visit.    Facility-Administered Medications Ordered in Other Visits:      bupivacaine (MARCAINE) 0.125 % injection (diluted from stock concentration by MD or CRNA), , EPIDURAL, PRN, Maycol Greene MD, 8 mL at 20 [id number]     fentaNYL (SUBLIMAZE) 2 mcg/mL, bupivacaine (MARCAINE) 0.125% in NaCl 0.9% EPIDURAL infusion, ,  EPIDURAL, Continuous PRN, Maycol Greene MD, Last Rate: 5 mL/hr at 06/24/20 1501, 5 mL/hr at 06/24/20 1501     lidocaine-EPINEPHrine 1.5 %-1:141471 injection, , EPIDURAL, PRN, Maycol Greene MD, 3 mL at 06/24/20 1456    Allergies   Allergen Reactions     Sulfa Drugs      Penicillins Other (See Comments) and Rash     Unknown reaction         Family History   Problem Relation Age of Onset     Ovarian Cancer Mother      Hypertension Maternal Grandmother      Glaucoma Maternal Grandmother      Macular Degeneration Maternal Grandmother        Past medical, social, surgical and family history were reviewed and updated in EPIC.    ROS: No TIA's or unusual headaches, no dysphagia.  No prolonged cough. No dyspnea or chest pain on exertion.  No abdominal pain, change in bowel habits, black or bloody stools.  No urinary tract symptoms.  No new or unusual musculoskeletal symptoms.  Normal menses, no abnormal vaginal bleeding, discharge or unexpected pelvic pain. No new breast lumps, breast pain or nipple discharge.    PE: /59   Pulse 73   Wt 56.6 kg (124 lb 11.2 oz)   LMP 12/15/2021   BMI 20.13 kg/m      Gen: Healthy appearing female in no acute distress  Heart: RRR  Lungs: CTAB  Abd: +BS, SNT  Ex: No C/C/E, no suspicious rashes or lesions    BSUS: single live IUP, +FCA.    A/P:  1) IUP at 10w0d, ama        PNL today, pap UTD        Reviewed anticipated course of prenatal care        Reviewed recommendations for weight gain, activity and diet        We discussed options for genetic screening and diagnosis including CVS/amnio, first trimester screen and quad screen.  We discussed a fetal survey will be performed around 18-20 weeks. Doing NIPT today, scheduled for NT.  Plan level 2        We discussed COVID 19 in pregnancy and increased risk of more severe disease requiring hospitalization, ventilation and slight increased risk of death.  Possible increased risk of PTL/PTD.  Instructed her to follow safety guidelines  strictly, avoid gathering outside of household and work remotely if/when possible.  She is triple vaxxed        Discussed MD call schedule as well as role of residents and med students both in clinic and hospital.  She is ok with resident care in ob as well as anesthesia.  If she needs a wyatt or cook she would prefer a senior resident or staff MD place, otherwise ok with all residents.    RTC 4 weeks    Codi Case MD

## 2022-02-24 LAB
RUBV IGG SERPL QL IA: 2.32 INDEX
RUBV IGG SERPL QL IA: POSITIVE

## 2022-02-25 LAB — BACTERIA UR CULT: NO GROWTH

## 2022-03-01 ENCOUNTER — TELEPHONE (OUTPATIENT)
Dept: MATERNAL FETAL MEDICINE | Facility: CLINIC | Age: 37
End: 2022-03-01
Payer: COMMERCIAL

## 2022-03-01 LAB — SCANNED LAB RESULT: NORMAL

## 2022-03-01 NOTE — TELEPHONE ENCOUNTER
March 1, 2022  I spoke with Arely regarding her NIPT results.     Results indicate NO ANEUPLOIDY DETECTED for chromosomes 21, 18, 13, or sex chromosomes. Sex of baby information will be placed in envelope for Arely to  at [facility] .      This puts her current pregnancy at low risk for Down syndrome, trisomy 18, trisomy 13 and sex chromosome abnormalities. This test is reported to have the following sensitivities: Down syndrome: 99.99%, trisomy 18: 99.99%, and trisomy 13: 99.99%. Although these results are reassuring, this does not replace a standard chromosome analysis from a chorionic villus sampling or amniocentesis.     Level II ultrasound is recommended, given AMA.     MSAFP is the appropriate second trimester screening test for open neural tube defects; the maternal quad screen is not recommended.    Her results are available in her Epic chart for her primary OB to review.     Audra Barbosa MS, [facility]  Licensed Genetic Counselor  [facility]  Maternal Fetal Medicine  Ph: [phone]  [email or web address]

## 2022-03-01 NOTE — TELEPHONE ENCOUNTER
March 1, 2022    Attempted to reach Arely regarding NIPT result, however she is unavailable and voicemail is not set up, unable to leave message. Will attempt again at later time.      Audra Barbosa MS, [facility]  Licensed Genetic Counselor  [facility]  Maternal Fetal Medicine  Ph: [phone]  [email or web address]

## 2022-03-09 ENCOUNTER — HOSPITAL ENCOUNTER (OUTPATIENT)
Dept: ULTRASOUND IMAGING | Facility: CLINIC | Age: 37
End: 2022-03-09
Attending: OBSTETRICS & GYNECOLOGY
Payer: COMMERCIAL

## 2022-03-09 ENCOUNTER — OFFICE VISIT (OUTPATIENT)
Dept: MATERNAL FETAL MEDICINE | Facility: CLINIC | Age: 37
End: 2022-03-09
Attending: OBSTETRICS & GYNECOLOGY
Payer: COMMERCIAL

## 2022-03-09 DIAGNOSIS — O26.90 PREGNANCY RELATED CONDITION, ANTEPARTUM: ICD-10-CM

## 2022-03-09 DIAGNOSIS — O09.521 MULTIGRAVIDA OF ADVANCED MATERNAL AGE IN FIRST TRIMESTER: Primary | ICD-10-CM

## 2022-03-09 PROCEDURE — 76801 OB US < 14 WKS SINGLE FETUS: CPT | Mod: 26 | Performed by: OBSTETRICS & GYNECOLOGY

## 2022-03-09 PROCEDURE — 76801 OB US < 14 WKS SINGLE FETUS: CPT

## 2022-03-09 NOTE — PROGRESS NOTES
Please refer to ultrasound report under 'Imaging' Studies of 'Chart Review' tabs.    Brent Lazo M.D.

## 2022-03-11 ENCOUNTER — MYC MEDICAL ADVICE (OUTPATIENT)
Dept: OBGYN | Facility: CLINIC | Age: 37
End: 2022-03-11
Payer: COMMERCIAL

## 2022-03-11 NOTE — TELEPHONE ENCOUNTER
Contacted patient and was attempting to view schedules for the day cancelled (03/23). Was attempting to inform patient that day is not available and patient ended call politely but disconnected quickly. Will route to Reception to call back and reschedule. No medical reason noted for emergent rescheduling.   Silvana FORD RN

## 2022-04-20 ENCOUNTER — OFFICE VISIT (OUTPATIENT)
Dept: MATERNAL FETAL MEDICINE | Facility: CLINIC | Age: 37
End: 2022-04-20
Attending: OBSTETRICS & GYNECOLOGY
Payer: COMMERCIAL

## 2022-04-20 ENCOUNTER — PRENATAL OFFICE VISIT (OUTPATIENT)
Dept: OBGYN | Facility: CLINIC | Age: 37
End: 2022-04-20
Payer: COMMERCIAL

## 2022-04-20 ENCOUNTER — HOSPITAL ENCOUNTER (OUTPATIENT)
Dept: ULTRASOUND IMAGING | Facility: CLINIC | Age: 37
Discharge: HOME | End: 2022-04-20
Attending: OBSTETRICS & GYNECOLOGY
Payer: COMMERCIAL

## 2022-04-20 VITALS
HEART RATE: 58 BPM | BODY MASS INDEX: 21.26 KG/M2 | WEIGHT: 131.7 LBS | DIASTOLIC BLOOD PRESSURE: 58 MMHG | SYSTOLIC BLOOD PRESSURE: 100 MMHG

## 2022-04-20 DIAGNOSIS — Z34.82 ENCOUNTER FOR SUPERVISION OF OTHER NORMAL PREGNANCY IN SECOND TRIMESTER: ICD-10-CM

## 2022-04-20 DIAGNOSIS — Z34.81 ENCOUNTER FOR SUPERVISION OF OTHER NORMAL PREGNANCY, FIRST TRIMESTER: Primary | ICD-10-CM

## 2022-04-20 DIAGNOSIS — O43.113 CIRCUMVALLATE PLACENTA DURING PREGNANCY IN THIRD TRIMESTER, ANTEPARTUM: Primary | ICD-10-CM

## 2022-04-20 DIAGNOSIS — O09.521 MULTIGRAVIDA OF ADVANCED MATERNAL AGE IN FIRST TRIMESTER: ICD-10-CM

## 2022-04-20 PROCEDURE — 76811 OB US DETAILED SNGL FETUS: CPT

## 2022-04-20 PROCEDURE — 99207 PR PRENATAL VISIT: CPT | Performed by: OBSTETRICS & GYNECOLOGY

## 2022-04-20 PROCEDURE — 76811 OB US DETAILED SNGL FETUS: CPT | Mod: 26 | Performed by: OBSTETRICS & GYNECOLOGY

## 2022-04-20 RX ORDER — ONDANSETRON 4 MG/1
4 TABLET, ORALLY DISINTEGRATING ORAL EVERY 8 HOURS PRN
Qty: 30 TABLET | Refills: 4 | Status: ON HOLD | OUTPATIENT
Start: 2022-04-20 | End: 2022-09-17

## 2022-04-20 NOTE — PROGRESS NOTES
18w0d  [facility] US prior to this appointment.  Per patient report, found to have placental lakes and circumvallate placenta.  Has follow-up scheduled, but rather nervous.  Discussed these findings and that in a majority of cases, there are no further pregnancy complications.  Discussed option for msAFP screening.  Given no evidence of ONTD on US, declined.  Having occ struggles with nausea, and had kiddos sick recently.  Requested rx for Zofran prn, which was provided.  RTC 4w.  Will then have glucose as following visit.  Venita Shaffer MD

## 2022-05-18 ENCOUNTER — PRENATAL OFFICE VISIT (OUTPATIENT)
Dept: OBGYN | Facility: CLINIC | Age: 37
End: 2022-05-18
Payer: COMMERCIAL

## 2022-05-18 VITALS
WEIGHT: 136.8 LBS | OXYGEN SATURATION: 100 % | BODY MASS INDEX: 22.08 KG/M2 | DIASTOLIC BLOOD PRESSURE: 62 MMHG | SYSTOLIC BLOOD PRESSURE: 98 MMHG | HEART RATE: 67 BPM

## 2022-05-18 DIAGNOSIS — Z34.82 ENCOUNTER FOR SUPERVISION OF OTHER NORMAL PREGNANCY IN SECOND TRIMESTER: Primary | ICD-10-CM

## 2022-05-18 PROCEDURE — 99207 PR PRENATAL VISIT: CPT | Performed by: OBSTETRICS & GYNECOLOGY

## 2022-05-18 NOTE — PROGRESS NOTES
22w0d  Doing well. Active fetal movement. No contractions, no leaking, no bleeding.  RTC 4w, will do gtt then.  Venita Ragland MD

## 2022-06-15 ENCOUNTER — LAB (OUTPATIENT)
Dept: LAB | Facility: CLINIC | Age: 37
End: 2022-06-15
Payer: COMMERCIAL

## 2022-06-15 ENCOUNTER — PRENATAL OFFICE VISIT (OUTPATIENT)
Dept: OBGYN | Facility: CLINIC | Age: 37
End: 2022-06-15

## 2022-06-15 VITALS
BODY MASS INDEX: 22.47 KG/M2 | DIASTOLIC BLOOD PRESSURE: 66 MMHG | HEART RATE: 62 BPM | WEIGHT: 139.2 LBS | SYSTOLIC BLOOD PRESSURE: 102 MMHG

## 2022-06-15 DIAGNOSIS — R60.0 LOWER EXTREMITY EDEMA: ICD-10-CM

## 2022-06-15 DIAGNOSIS — R73.09 IMPAIRED GLUCOSE TOLERANCE TEST: Primary | ICD-10-CM

## 2022-06-15 DIAGNOSIS — Z3A.26 26 WEEKS GESTATION OF PREGNANCY: Primary | ICD-10-CM

## 2022-06-15 DIAGNOSIS — R73.9 ELEVATED SERUM GLUCOSE: ICD-10-CM

## 2022-06-15 DIAGNOSIS — Z34.81 ENCOUNTER FOR SUPERVISION OF OTHER NORMAL PREGNANCY, FIRST TRIMESTER: ICD-10-CM

## 2022-06-15 LAB
GLUCOSE 1H P 50 G GLC PO SERPL-MCNC: 166 MG/DL (ref 70–129)
HGB BLD-MCNC: 12.6 G/DL (ref 11.7–15.7)
HOLD SPECIMEN: NORMAL

## 2022-06-15 PROCEDURE — 99207 PR PRENATAL VISIT: CPT | Performed by: OBSTETRICS & GYNECOLOGY

## 2022-06-15 PROCEDURE — 36415 COLL VENOUS BLD VENIPUNCTURE: CPT

## 2022-06-15 PROCEDURE — 82950 GLUCOSE TEST: CPT

## 2022-06-15 NOTE — PROGRESS NOTES
Patient reports she is feeling some BH ctx's.  Denies any vaginal bleeding or leaking fluid.  Normal fetal movement.  GCT elevated.  With work schedule and travel planned to the [place], does not think she will be able to do 3 hour GTT.  Discussed alternative with QID blood sugar testing for 1 week, which seems like a better plan for her.  Will plan fasting value and then determine if she wants to check one hour or two hours pp.  Goal for fasting <95 and <140 for 1 hour pp (<120 for 2 hour pp).  Patient will send us one week of values via [employer].  Some LE edema.  Compression stockings help.  DME order placed for additional compression stockings.  Discussed Type and screen draw follow by Rhogam shot at 28 weeks due to Rh negative status.  Ok for RN only visit.  Next visit in 4 weeks with Tdap.   MFM ultrasound 6/29/22, growth due to circumvallate placenta     Shruthi Keys MD

## 2022-06-29 ENCOUNTER — ALLIED HEALTH/NURSE VISIT (OUTPATIENT)
Dept: NURSING | Facility: CLINIC | Age: 37
End: 2022-06-29
Payer: COMMERCIAL

## 2022-06-29 ENCOUNTER — OFFICE VISIT (OUTPATIENT)
Dept: MATERNAL FETAL MEDICINE | Facility: CLINIC | Age: 37
End: 2022-06-29
Attending: OBSTETRICS & GYNECOLOGY
Payer: COMMERCIAL

## 2022-06-29 ENCOUNTER — HOSPITAL ENCOUNTER (OUTPATIENT)
Dept: ULTRASOUND IMAGING | Facility: CLINIC | Age: 37
Discharge: HOME | End: 2022-06-29
Attending: OBSTETRICS & GYNECOLOGY
Payer: COMMERCIAL

## 2022-06-29 DIAGNOSIS — Z34.81 ENCOUNTER FOR SUPERVISION OF OTHER NORMAL PREGNANCY, FIRST TRIMESTER: ICD-10-CM

## 2022-06-29 DIAGNOSIS — O43.113 CIRCUMVALLATE PLACENTA DURING PREGNANCY IN THIRD TRIMESTER, ANTEPARTUM: Primary | ICD-10-CM

## 2022-06-29 DIAGNOSIS — O43.113 CIRCUMVALLATE PLACENTA DURING PREGNANCY IN THIRD TRIMESTER, ANTEPARTUM: ICD-10-CM

## 2022-06-29 DIAGNOSIS — Z67.91 RH NEGATIVE STATE IN ANTEPARTUM PERIOD: Primary | ICD-10-CM

## 2022-06-29 DIAGNOSIS — O26.899 RH NEGATIVE STATE IN ANTEPARTUM PERIOD: Primary | ICD-10-CM

## 2022-06-29 LAB
ANTIBODY SCREEN: NEGATIVE
SPECIMEN EXPIRATION DATE: NORMAL

## 2022-06-29 PROCEDURE — 96372 THER/PROPH/DIAG INJ SC/IM: CPT | Performed by: OBSTETRICS & GYNECOLOGY

## 2022-06-29 PROCEDURE — 76816 OB US FOLLOW-UP PER FETUS: CPT

## 2022-06-29 PROCEDURE — 76816 OB US FOLLOW-UP PER FETUS: CPT | Mod: 26 | Performed by: OBSTETRICS & GYNECOLOGY

## 2022-06-29 PROCEDURE — 86850 RBC ANTIBODY SCREEN: CPT

## 2022-06-29 PROCEDURE — 36415 COLL VENOUS BLD VENIPUNCTURE: CPT

## 2022-06-29 NOTE — PROGRESS NOTES
The patient was seen for an ultrasound in the Maternal-Fetal Medicine Center at the [facility] today.  For a detailed report of the ultrasound examination, please see the ultrasound report which can be found under the imaging tab.    Riddhi Ortega MD  , OB/GYN  Maternal-Fetal Medicine  [phone] (Pager)

## 2022-07-13 ENCOUNTER — PRENATAL OFFICE VISIT (OUTPATIENT)
Dept: OBGYN | Facility: CLINIC | Age: 37
End: 2022-07-13
Payer: COMMERCIAL

## 2022-07-13 VITALS
OXYGEN SATURATION: 98 % | SYSTOLIC BLOOD PRESSURE: 117 MMHG | DIASTOLIC BLOOD PRESSURE: 70 MMHG | WEIGHT: 142 LBS | HEART RATE: 74 BPM | BODY MASS INDEX: 22.92 KG/M2

## 2022-07-13 DIAGNOSIS — Z23 NEED FOR TDAP VACCINATION: Primary | ICD-10-CM

## 2022-07-13 PROCEDURE — 99207 PR PRENATAL VISIT: CPT | Performed by: OBSTETRICS & GYNECOLOGY

## 2022-07-13 PROCEDURE — 90715 TDAP VACCINE 7 YRS/> IM: CPT | Performed by: OBSTETRICS & GYNECOLOGY

## 2022-07-13 PROCEDURE — 90471 IMMUNIZATION ADMIN: CPT | Performed by: OBSTETRICS & GYNECOLOGY

## 2022-07-13 ASSESSMENT — PATIENT HEALTH QUESTIONNAIRE - PHQ9: SUM OF ALL RESPONSES TO PHQ QUESTIONS 1-9: 3

## 2022-07-13 NOTE — PROGRESS NOTES
30w0d  Active fetal movement. Contractions when at work and needs to pee. No leaking or bleeding.  Wears compression socks. Some constipation.   Failed 1h gtt. Tracked BG wnl. Has repeat growth US on 8/10 for circumvallate placenta.   RTC 2 weeks, sooner PRN.   Venita Ragland MD

## 2022-07-13 NOTE — NURSING NOTE
Clinic Administered Medication Documentation          Injectable Medication Documentation    Patient was given tdap. Prior to medication administration, verified patients identity using patient s name and date of birth. Please see MAR and medication order for additional information. Patient instructed to remain in clinic for 15 minutes.      Was entire vial of medication used? Yes  Vial/Syringe: Single dose vial  Expiration Date:  3/11/24  Was this medication supplied by the patient? No

## 2022-08-10 ENCOUNTER — PRENATAL OFFICE VISIT (OUTPATIENT)
Dept: OBGYN | Facility: CLINIC | Age: 37
End: 2022-08-10

## 2022-08-10 ENCOUNTER — HOSPITAL ENCOUNTER (OUTPATIENT)
Dept: ULTRASOUND IMAGING | Facility: CLINIC | Age: 37
Discharge: HOME | End: 2022-08-10
Attending: OBSTETRICS & GYNECOLOGY
Payer: COMMERCIAL

## 2022-08-10 ENCOUNTER — OFFICE VISIT (OUTPATIENT)
Dept: MATERNAL FETAL MEDICINE | Facility: CLINIC | Age: 37
End: 2022-08-10
Attending: OBSTETRICS & GYNECOLOGY
Payer: COMMERCIAL

## 2022-08-10 VITALS
DIASTOLIC BLOOD PRESSURE: 65 MMHG | WEIGHT: 146.8 LBS | SYSTOLIC BLOOD PRESSURE: 100 MMHG | HEART RATE: 72 BPM | BODY MASS INDEX: 23.69 KG/M2

## 2022-08-10 DIAGNOSIS — O43.113 CIRCUMVALLATE PLACENTA DURING PREGNANCY IN THIRD TRIMESTER, ANTEPARTUM: ICD-10-CM

## 2022-08-10 DIAGNOSIS — O26.893 LOW BACK PAIN DURING PREGNANCY IN THIRD TRIMESTER: Primary | ICD-10-CM

## 2022-08-10 DIAGNOSIS — O43.113 CIRCUMVALLATE PLACENTA DURING PREGNANCY IN THIRD TRIMESTER, ANTEPARTUM: Primary | ICD-10-CM

## 2022-08-10 DIAGNOSIS — M54.50 LOW BACK PAIN DURING PREGNANCY IN THIRD TRIMESTER: Primary | ICD-10-CM

## 2022-08-10 PROCEDURE — 76816 OB US FOLLOW-UP PER FETUS: CPT | Mod: 26 | Performed by: OBSTETRICS & GYNECOLOGY

## 2022-08-10 PROCEDURE — 76816 OB US FOLLOW-UP PER FETUS: CPT

## 2022-08-10 PROCEDURE — 99207 PR PRENATAL VISIT: CPT | Performed by: OBSTETRICS & GYNECOLOGY

## 2022-08-10 NOTE — PROGRESS NOTES
Patient reports she is feeling tired, but overall well.  Baby feels low.  Has not used a pregnancy support belt, but is open to getting one fit today.  She is having some BH ctx's and about a couple of painful ctx per day.  No vaginal bleeding or leaking fluid.  Normal fetal movement.  Reviewed MFM ultrasound findings today with EFW of 61%tile, cephalic, and partial circumvallate placenta.  Discussed IOL at 39 wk for AMA, which patient is in agreement with.  Prefers evening IOL on 9/15/22, scheduled with L&D at 1930 on 9/15/22.  Discussed compression tights for vulvar varicosities.    Next visit 8/31/22 with GBS and OB Hgb.  Shruthi Keys MD

## 2022-08-10 NOTE — PROGRESS NOTES
The patient was seen for an ultrasound in the Maternal-Fetal Medicine Center at the [facility] today.  For a detailed report of the ultrasound examination, please see the ultrasound report which can be found under the imaging tab.    Riddhi Ortega MD  , OB/GYN  Maternal-Fetal Medicine  [phone] (Pager)

## 2022-08-26 ENCOUNTER — TELEPHONE (OUTPATIENT)
Dept: OBGYN | Facility: CLINIC | Age: 37
End: 2022-08-26

## 2022-08-31 ENCOUNTER — PRENATAL OFFICE VISIT (OUTPATIENT)
Dept: OBGYN | Facility: CLINIC | Age: 37
End: 2022-08-31
Payer: COMMERCIAL

## 2022-08-31 VITALS
DIASTOLIC BLOOD PRESSURE: 68 MMHG | HEART RATE: 75 BPM | WEIGHT: 152.7 LBS | SYSTOLIC BLOOD PRESSURE: 111 MMHG | BODY MASS INDEX: 24.65 KG/M2 | OXYGEN SATURATION: 98 %

## 2022-08-31 DIAGNOSIS — Z34.82 ENCOUNTER FOR SUPERVISION OF OTHER NORMAL PREGNANCY IN SECOND TRIMESTER: Primary | ICD-10-CM

## 2022-08-31 LAB
HGB BLD-MCNC: 12.9 G/DL (ref 11.7–15.7)
HOLD SPECIMEN: NORMAL

## 2022-08-31 PROCEDURE — 36415 COLL VENOUS BLD VENIPUNCTURE: CPT | Performed by: OBSTETRICS & GYNECOLOGY

## 2022-08-31 PROCEDURE — 87653 STREP B DNA AMP PROBE: CPT | Performed by: OBSTETRICS & GYNECOLOGY

## 2022-08-31 PROCEDURE — 99207 PR PRENATAL VISIT: CPT | Performed by: OBSTETRICS & GYNECOLOGY

## 2022-08-31 NOTE — PROGRESS NOTES
Denies any contractions, vaginal bleeding, leaking fluid.  Normal fetal movement.  Discussed avoiding contact with child with hand foot and mouth.    GBS, OB Hgb today.  Cephalic by BSUS  Next visit 9/9/22    Shruthi Keys MD

## 2022-09-01 LAB — GP B STREP DNA SPEC QL NAA+PROBE: NEGATIVE

## 2022-09-09 ENCOUNTER — PRENATAL OFFICE VISIT (OUTPATIENT)
Dept: OBGYN | Facility: CLINIC | Age: 37
End: 2022-09-09
Payer: COMMERCIAL

## 2022-09-09 VITALS
OXYGEN SATURATION: 96 % | SYSTOLIC BLOOD PRESSURE: 113 MMHG | HEART RATE: 80 BPM | DIASTOLIC BLOOD PRESSURE: 72 MMHG | BODY MASS INDEX: 24.86 KG/M2 | WEIGHT: 154 LBS

## 2022-09-09 DIAGNOSIS — Z11.59 SCREENING FOR VIRAL DISEASE: ICD-10-CM

## 2022-09-09 DIAGNOSIS — Z34.83 ENCOUNTER FOR SUPERVISION OF OTHER NORMAL PREGNANCY IN THIRD TRIMESTER: Primary | ICD-10-CM

## 2022-09-09 PROCEDURE — 59025 FETAL NON-STRESS TEST: CPT | Performed by: OBSTETRICS & GYNECOLOGY

## 2022-09-09 PROCEDURE — 99207 PR PRENATAL VISIT: CPT | Performed by: OBSTETRICS & GYNECOLOGY

## 2022-09-09 NOTE — PROGRESS NOTES
38w2d  Getting more uncomfortable, but no s/sx of labor.  Baby very active.  Doptones with tachycardia.  NST performed and reactive.  135/mod/+ accels, no decels.  IOL scheduled previously.  COVID swab ordered.  RTC 1w.  Venita Shaffer MD

## 2022-09-14 ENCOUNTER — PRENATAL OFFICE VISIT (OUTPATIENT)
Dept: OBGYN | Facility: CLINIC | Age: 37
End: 2022-09-14
Payer: COMMERCIAL

## 2022-09-14 ENCOUNTER — LAB (OUTPATIENT)
Dept: LAB | Facility: CLINIC | Age: 37
End: 2022-09-14
Attending: OBSTETRICS & GYNECOLOGY

## 2022-09-14 VITALS
WEIGHT: 154 LBS | SYSTOLIC BLOOD PRESSURE: 122 MMHG | OXYGEN SATURATION: 98 % | DIASTOLIC BLOOD PRESSURE: 80 MMHG | BODY MASS INDEX: 24.86 KG/M2 | HEART RATE: 77 BPM

## 2022-09-14 DIAGNOSIS — Z11.59 SCREENING FOR VIRAL DISEASE: ICD-10-CM

## 2022-09-14 DIAGNOSIS — Z34.83 ENCOUNTER FOR SUPERVISION OF OTHER NORMAL PREGNANCY IN THIRD TRIMESTER: Primary | ICD-10-CM

## 2022-09-14 LAB — SARS-COV-2 RNA RESP QL NAA+PROBE: NEGATIVE

## 2022-09-14 PROCEDURE — 99207 PR PRENATAL VISIT: CPT | Performed by: OBSTETRICS & GYNECOLOGY

## 2022-09-14 PROCEDURE — U0003 INFECTIOUS AGENT DETECTION BY NUCLEIC ACID (DNA OR RNA); SEVERE ACUTE RESPIRATORY SYNDROME CORONAVIRUS 2 (SARS-COV-2) (CORONAVIRUS DISEASE [COVID-19]), AMPLIFIED PROBE TECHNIQUE, MAKING USE OF HIGH THROUGHPUT TECHNOLOGIES AS DESCRIBED BY CMS-2020-01-R: HCPCS

## 2022-09-14 PROCEDURE — U0005 INFEC AGEN DETEC AMPLI PROBE: HCPCS

## 2022-09-14 NOTE — PROGRESS NOTES
Patient states she is having some contractions.  No vaginal bleeding or leaking fluid.  Normal fetal movement.  Has home pp OTC meds.  GBS negative.  COVID19 swab today.  IOL 9/15/22 at 1730.   3/60/-2/medium/mid, membrane sweep with some light red bleeding on glove.    Patient would like salpingectomy if  occurs for permanent sterilization.  Shruthi Keys MD

## 2022-09-15 ENCOUNTER — ANESTHESIA EVENT (OUTPATIENT)
Dept: OBGYN | Facility: CLINIC | Age: 37
End: 2022-09-15
Payer: COMMERCIAL

## 2022-09-15 ENCOUNTER — HOSPITAL ENCOUNTER (INPATIENT)
Facility: CLINIC | Age: 37
LOS: 2 days | Discharge: HOME HEALTH | End: 2022-09-17
Attending: OBSTETRICS & GYNECOLOGY | Admitting: OBSTETRICS & GYNECOLOGY
Payer: COMMERCIAL

## 2022-09-15 ENCOUNTER — ANESTHESIA (OUTPATIENT)
Dept: OBGYN | Facility: CLINIC | Age: 37
End: 2022-09-15
Payer: COMMERCIAL

## 2022-09-15 PROBLEM — Z34.90 PREGNANCY: Status: ACTIVE | Noted: 2022-09-15

## 2022-09-15 LAB
ABO/RH(D): ABNORMAL
ANTIBODY ID: NORMAL
ANTIBODY SCREEN: POSITIVE
ERYTHROCYTE [DISTWIDTH] IN BLOOD BY AUTOMATED COUNT: 12.9 % (ref 10–15)
HCT VFR BLD AUTO: 41.7 % (ref 35–47)
HGB BLD-MCNC: 14.3 G/DL (ref 11.7–15.7)
MCH RBC QN AUTO: 30.1 PG (ref 26.5–33)
MCHC RBC AUTO-ENTMCNC: 34.3 G/DL (ref 31.5–36.5)
MCV RBC AUTO: 88 FL (ref 78–100)
PLATELET # BLD AUTO: 122 10E3/UL (ref 150–450)
RBC # BLD AUTO: 4.75 10E6/UL (ref 3.8–5.2)
SPECIMEN EXPIRATION DATE: ABNORMAL
SPECIMEN EXPIRATION DATE: NORMAL
WBC # BLD AUTO: 13.7 10E3/UL (ref 4–11)

## 2022-09-15 PROCEDURE — 86850 RBC ANTIBODY SCREEN: CPT | Performed by: OBSTETRICS & GYNECOLOGY

## 2022-09-15 PROCEDURE — 86870 RBC ANTIBODY IDENTIFICATION: CPT | Performed by: OBSTETRICS & GYNECOLOGY

## 2022-09-15 PROCEDURE — 258N000003 HC RX IP 258 OP 636: Performed by: OBSTETRICS & GYNECOLOGY

## 2022-09-15 PROCEDURE — 120N000002 HC R&B MED SURG/OB UMMC

## 2022-09-15 PROCEDURE — 3E033VJ INTRODUCTION OF OTHER HORMONE INTO PERIPHERAL VEIN, PERCUTANEOUS APPROACH: ICD-10-PCS | Performed by: OBSTETRICS & GYNECOLOGY

## 2022-09-15 PROCEDURE — 86780 TREPONEMA PALLIDUM: CPT | Performed by: OBSTETRICS & GYNECOLOGY

## 2022-09-15 PROCEDURE — 250N000009 HC RX 250: Performed by: OBSTETRICS & GYNECOLOGY

## 2022-09-15 PROCEDURE — 85027 COMPLETE CBC AUTOMATED: CPT | Performed by: OBSTETRICS & GYNECOLOGY

## 2022-09-15 PROCEDURE — 370N000003 HC ANESTHESIA WARD SERVICE

## 2022-09-15 RX ORDER — OXYTOCIN/0.9 % SODIUM CHLORIDE 30/500 ML
1-24 PLASTIC BAG, INJECTION (ML) INTRAVENOUS CONTINUOUS
Status: DISCONTINUED | OUTPATIENT
Start: 2022-09-15 | End: 2022-09-16 | Stop reason: HOSPADM

## 2022-09-15 RX ORDER — NALOXONE HYDROCHLORIDE 0.4 MG/ML
0.2 INJECTION, SOLUTION INTRAMUSCULAR; INTRAVENOUS; SUBCUTANEOUS
Status: DISCONTINUED | OUTPATIENT
Start: 2022-09-15 | End: 2022-09-16 | Stop reason: HOSPADM

## 2022-09-15 RX ORDER — METHYLERGONOVINE MALEATE 0.2 MG/ML
200 INJECTION INTRAVENOUS
Status: DISCONTINUED | OUTPATIENT
Start: 2022-09-15 | End: 2022-09-16 | Stop reason: HOSPADM

## 2022-09-15 RX ORDER — OXYTOCIN/0.9 % SODIUM CHLORIDE 30/500 ML
340 PLASTIC BAG, INJECTION (ML) INTRAVENOUS CONTINUOUS PRN
Status: DISCONTINUED | OUTPATIENT
Start: 2022-09-15 | End: 2022-09-16 | Stop reason: HOSPADM

## 2022-09-15 RX ORDER — NALOXONE HYDROCHLORIDE 0.4 MG/ML
0.4 INJECTION, SOLUTION INTRAMUSCULAR; INTRAVENOUS; SUBCUTANEOUS
Status: DISCONTINUED | OUTPATIENT
Start: 2022-09-15 | End: 2022-09-16 | Stop reason: HOSPADM

## 2022-09-15 RX ORDER — SODIUM CHLORIDE, SODIUM LACTATE, POTASSIUM CHLORIDE, CALCIUM CHLORIDE 600; 310; 30; 20 MG/100ML; MG/100ML; MG/100ML; MG/100ML
INJECTION, SOLUTION INTRAVENOUS CONTINUOUS
Status: DISCONTINUED | OUTPATIENT
Start: 2022-09-15 | End: 2022-09-16 | Stop reason: HOSPADM

## 2022-09-15 RX ORDER — PROCHLORPERAZINE 25 MG
25 SUPPOSITORY, RECTAL RECTAL EVERY 12 HOURS PRN
Status: DISCONTINUED | OUTPATIENT
Start: 2022-09-15 | End: 2022-09-16 | Stop reason: HOSPADM

## 2022-09-15 RX ORDER — TERBUTALINE SULFATE 1 MG/ML
0.25 INJECTION, SOLUTION SUBCUTANEOUS
Status: DISCONTINUED | OUTPATIENT
Start: 2022-09-15 | End: 2022-09-16 | Stop reason: HOSPADM

## 2022-09-15 RX ORDER — TRANEXAMIC ACID 10 MG/ML
1 INJECTION, SOLUTION INTRAVENOUS EVERY 30 MIN PRN
Status: DISCONTINUED | OUTPATIENT
Start: 2022-09-15 | End: 2022-09-16 | Stop reason: HOSPADM

## 2022-09-15 RX ORDER — CITRIC ACID/SODIUM CITRATE 334-500MG
30 SOLUTION, ORAL ORAL
Status: DISCONTINUED | OUTPATIENT
Start: 2022-09-15 | End: 2022-09-16 | Stop reason: HOSPADM

## 2022-09-15 RX ORDER — METOCLOPRAMIDE HYDROCHLORIDE 5 MG/ML
10 INJECTION INTRAMUSCULAR; INTRAVENOUS EVERY 6 HOURS PRN
Status: DISCONTINUED | OUTPATIENT
Start: 2022-09-15 | End: 2022-09-16 | Stop reason: HOSPADM

## 2022-09-15 RX ORDER — ONDANSETRON 4 MG/1
4 TABLET, ORALLY DISINTEGRATING ORAL EVERY 6 HOURS PRN
Status: DISCONTINUED | OUTPATIENT
Start: 2022-09-15 | End: 2022-09-16 | Stop reason: HOSPADM

## 2022-09-15 RX ORDER — OXYTOCIN 10 [USP'U]/ML
10 INJECTION, SOLUTION INTRAMUSCULAR; INTRAVENOUS
Status: DISCONTINUED | OUTPATIENT
Start: 2022-09-15 | End: 2022-09-16 | Stop reason: HOSPADM

## 2022-09-15 RX ORDER — IBUPROFEN 800 MG/1
800 TABLET, FILM COATED ORAL
Status: DISCONTINUED | OUTPATIENT
Start: 2022-09-15 | End: 2022-09-17 | Stop reason: HOSPADM

## 2022-09-15 RX ORDER — CARBOPROST TROMETHAMINE 250 UG/ML
250 INJECTION, SOLUTION INTRAMUSCULAR
Status: DISCONTINUED | OUTPATIENT
Start: 2022-09-15 | End: 2022-09-16 | Stop reason: HOSPADM

## 2022-09-15 RX ORDER — LIDOCAINE 40 MG/G
CREAM TOPICAL
Status: DISCONTINUED | OUTPATIENT
Start: 2022-09-15 | End: 2022-09-16 | Stop reason: HOSPADM

## 2022-09-15 RX ORDER — PROCHLORPERAZINE MALEATE 10 MG
10 TABLET ORAL EVERY 6 HOURS PRN
Status: DISCONTINUED | OUTPATIENT
Start: 2022-09-15 | End: 2022-09-16 | Stop reason: HOSPADM

## 2022-09-15 RX ORDER — MISOPROSTOL 200 UG/1
800 TABLET ORAL
Status: DISCONTINUED | OUTPATIENT
Start: 2022-09-15 | End: 2022-09-16 | Stop reason: HOSPADM

## 2022-09-15 RX ORDER — KETOROLAC TROMETHAMINE 30 MG/ML
30 INJECTION, SOLUTION INTRAMUSCULAR; INTRAVENOUS
Status: DISCONTINUED | OUTPATIENT
Start: 2022-09-15 | End: 2022-09-17 | Stop reason: HOSPADM

## 2022-09-15 RX ORDER — SODIUM CHLORIDE, SODIUM LACTATE, POTASSIUM CHLORIDE, CALCIUM CHLORIDE 600; 310; 30; 20 MG/100ML; MG/100ML; MG/100ML; MG/100ML
INJECTION, SOLUTION INTRAVENOUS CONTINUOUS PRN
Status: DISCONTINUED | OUTPATIENT
Start: 2022-09-15 | End: 2022-09-16 | Stop reason: HOSPADM

## 2022-09-15 RX ORDER — OXYTOCIN 10 [USP'U]/ML
10 INJECTION, SOLUTION INTRAMUSCULAR; INTRAVENOUS
Status: DISCONTINUED | OUTPATIENT
Start: 2022-09-15 | End: 2022-09-17 | Stop reason: HOSPADM

## 2022-09-15 RX ORDER — MISOPROSTOL 200 UG/1
400 TABLET ORAL
Status: DISCONTINUED | OUTPATIENT
Start: 2022-09-15 | End: 2022-09-16 | Stop reason: HOSPADM

## 2022-09-15 RX ORDER — METOCLOPRAMIDE 10 MG/1
10 TABLET ORAL EVERY 6 HOURS PRN
Status: DISCONTINUED | OUTPATIENT
Start: 2022-09-15 | End: 2022-09-16 | Stop reason: HOSPADM

## 2022-09-15 RX ORDER — ONDANSETRON 2 MG/ML
4 INJECTION INTRAMUSCULAR; INTRAVENOUS EVERY 6 HOURS PRN
Status: DISCONTINUED | OUTPATIENT
Start: 2022-09-15 | End: 2022-09-16 | Stop reason: HOSPADM

## 2022-09-15 RX ORDER — OXYTOCIN/0.9 % SODIUM CHLORIDE 30/500 ML
100-340 PLASTIC BAG, INJECTION (ML) INTRAVENOUS CONTINUOUS PRN
Status: DISCONTINUED | OUTPATIENT
Start: 2022-09-15 | End: 2022-09-17 | Stop reason: HOSPADM

## 2022-09-15 RX ORDER — CITRIC ACID/SODIUM CITRATE 334-500MG
30 SOLUTION, ORAL ORAL ONCE
Status: DISCONTINUED | OUTPATIENT
Start: 2022-09-15 | End: 2022-09-16 | Stop reason: HOSPADM

## 2022-09-15 RX ADMIN — SODIUM CHLORIDE, POTASSIUM CHLORIDE, SODIUM LACTATE AND CALCIUM CHLORIDE: 600; 310; 30; 20 INJECTION, SOLUTION INTRAVENOUS at 21:00

## 2022-09-15 RX ADMIN — Medication 2 MILLI-UNITS/MIN: at 21:01

## 2022-09-15 RX ADMIN — SODIUM CHLORIDE, POTASSIUM CHLORIDE, SODIUM LACTATE AND CALCIUM CHLORIDE 1000 ML: 600; 310; 30; 20 INJECTION, SOLUTION INTRAVENOUS at 23:51

## 2022-09-15 ASSESSMENT — ACTIVITIES OF DAILY LIVING (ADL)
TOILETING_ISSUES: NO
HEARING_DIFFICULTY_OR_DEAF: NO
WEAR_GLASSES_OR_BLIND: YES
FALL_HISTORY_WITHIN_LAST_SIX_MONTHS: NO
VISION_MANAGEMENT: GLASSES
WALKING_OR_CLIMBING_STAIRS_DIFFICULTY: NO
DOING_ERRANDS_INDEPENDENTLY_DIFFICULTY: NO
DIFFICULTY_COMMUNICATING: NO
ADLS_ACUITY_SCORE: 35
CONCENTRATING,_REMEMBERING_OR_MAKING_DECISIONS_DIFFICULTY: NO
DRESSING/BATHING_DIFFICULTY: NO
CHANGE_IN_FUNCTIONAL_STATUS_SINCE_ONSET_OF_CURRENT_ILLNESS/INJURY: NO
ADLS_ACUITY_SCORE: 20
DIFFICULTY_EATING/SWALLOWING: NO

## 2022-09-16 LAB — T PALLIDUM AB SER QL: NONREACTIVE

## 2022-09-16 PROCEDURE — 250N000013 HC RX MED GY IP 250 OP 250 PS 637: Performed by: STUDENT IN AN ORGANIZED HEALTH CARE EDUCATION/TRAINING PROGRAM

## 2022-09-16 PROCEDURE — 0KQM0ZZ REPAIR PERINEUM MUSCLE, OPEN APPROACH: ICD-10-PCS | Performed by: OBSTETRICS & GYNECOLOGY

## 2022-09-16 PROCEDURE — 59400 OBSTETRICAL CARE: CPT | Mod: GC | Performed by: OBSTETRICS & GYNECOLOGY

## 2022-09-16 PROCEDURE — 722N000001 HC LABOR CARE VAGINAL DELIVERY SINGLE

## 2022-09-16 PROCEDURE — 250N000011 HC RX IP 250 OP 636

## 2022-09-16 PROCEDURE — 250N000011 HC RX IP 250 OP 636: Performed by: STUDENT IN AN ORGANIZED HEALTH CARE EDUCATION/TRAINING PROGRAM

## 2022-09-16 PROCEDURE — 10907ZC DRAINAGE OF AMNIOTIC FLUID, THERAPEUTIC FROM PRODUCTS OF CONCEPTION, VIA NATURAL OR ARTIFICIAL OPENING: ICD-10-PCS | Performed by: OBSTETRICS & GYNECOLOGY

## 2022-09-16 PROCEDURE — 120N000002 HC R&B MED SURG/OB UMMC

## 2022-09-16 PROCEDURE — 3E0R3BZ INTRODUCTION OF ANESTHETIC AGENT INTO SPINAL CANAL, PERCUTANEOUS APPROACH: ICD-10-PCS | Performed by: STUDENT IN AN ORGANIZED HEALTH CARE EDUCATION/TRAINING PROGRAM

## 2022-09-16 PROCEDURE — 250N000009 HC RX 250: Performed by: OBSTETRICS & GYNECOLOGY

## 2022-09-16 PROCEDURE — 250N000013 HC RX MED GY IP 250 OP 250 PS 637: Performed by: OBSTETRICS & GYNECOLOGY

## 2022-09-16 PROCEDURE — 00HU33Z INSERTION OF INFUSION DEVICE INTO SPINAL CANAL, PERCUTANEOUS APPROACH: ICD-10-PCS | Performed by: STUDENT IN AN ORGANIZED HEALTH CARE EDUCATION/TRAINING PROGRAM

## 2022-09-16 PROCEDURE — 258N000003 HC RX IP 258 OP 636: Performed by: STUDENT IN AN ORGANIZED HEALTH CARE EDUCATION/TRAINING PROGRAM

## 2022-09-16 RX ORDER — FENTANYL/ROPIVACAINE/NS/PF 2MCG/ML-.1
PLASTIC BAG, INJECTION (ML) EPIDURAL
Status: COMPLETED
Start: 2022-09-16 | End: 2022-09-16

## 2022-09-16 RX ORDER — FENTANYL/ROPIVACAINE/NS/PF 2MCG/ML-.1
PLASTIC BAG, INJECTION (ML) EPIDURAL
Status: DISCONTINUED | OUTPATIENT
Start: 2022-09-16 | End: 2022-09-16 | Stop reason: HOSPADM

## 2022-09-16 RX ORDER — TRANEXAMIC ACID 10 MG/ML
1 INJECTION, SOLUTION INTRAVENOUS EVERY 30 MIN PRN
Status: DISCONTINUED | OUTPATIENT
Start: 2022-09-16 | End: 2022-09-17 | Stop reason: HOSPADM

## 2022-09-16 RX ORDER — OXYTOCIN/0.9 % SODIUM CHLORIDE 30/500 ML
340 PLASTIC BAG, INJECTION (ML) INTRAVENOUS CONTINUOUS PRN
Status: DISCONTINUED | OUTPATIENT
Start: 2022-09-16 | End: 2022-09-17 | Stop reason: HOSPADM

## 2022-09-16 RX ORDER — MISOPROSTOL 200 UG/1
800 TABLET ORAL
Status: DISCONTINUED | OUTPATIENT
Start: 2022-09-16 | End: 2022-09-17 | Stop reason: HOSPADM

## 2022-09-16 RX ORDER — IBUPROFEN 800 MG/1
800 TABLET, FILM COATED ORAL EVERY 6 HOURS PRN
Status: DISCONTINUED | OUTPATIENT
Start: 2022-09-16 | End: 2022-09-17 | Stop reason: HOSPADM

## 2022-09-16 RX ORDER — OXYTOCIN 10 [USP'U]/ML
10 INJECTION, SOLUTION INTRAMUSCULAR; INTRAVENOUS
Status: DISCONTINUED | OUTPATIENT
Start: 2022-09-16 | End: 2022-09-17 | Stop reason: HOSPADM

## 2022-09-16 RX ORDER — FENTANYL CITRATE-0.9 % NACL/PF 10 MCG/ML
PLASTIC BAG, INJECTION (ML) INTRAVENOUS
Status: DISCONTINUED
Start: 2022-09-16 | End: 2022-09-16 | Stop reason: HOSPADM

## 2022-09-16 RX ORDER — FENTANYL CITRATE-0.9 % NACL/PF 10 MCG/ML
100 PLASTIC BAG, INJECTION (ML) INTRAVENOUS EVERY 5 MIN PRN
Status: DISCONTINUED | OUTPATIENT
Start: 2022-09-16 | End: 2022-09-16 | Stop reason: HOSPADM

## 2022-09-16 RX ORDER — MODIFIED LANOLIN
OINTMENT (GRAM) TOPICAL
Status: DISCONTINUED | OUTPATIENT
Start: 2022-09-16 | End: 2022-09-17 | Stop reason: HOSPADM

## 2022-09-16 RX ORDER — DOCUSATE SODIUM 100 MG/1
100 CAPSULE, LIQUID FILLED ORAL DAILY
Status: DISCONTINUED | OUTPATIENT
Start: 2022-09-16 | End: 2022-09-17 | Stop reason: HOSPADM

## 2022-09-16 RX ORDER — NALBUPHINE HYDROCHLORIDE 10 MG/ML
2.5-5 INJECTION, SOLUTION INTRAMUSCULAR; INTRAVENOUS; SUBCUTANEOUS EVERY 6 HOURS PRN
Status: DISCONTINUED | OUTPATIENT
Start: 2022-09-16 | End: 2022-09-17 | Stop reason: HOSPADM

## 2022-09-16 RX ORDER — CARBOPROST TROMETHAMINE 250 UG/ML
250 INJECTION, SOLUTION INTRAMUSCULAR
Status: DISCONTINUED | OUTPATIENT
Start: 2022-09-16 | End: 2022-09-17 | Stop reason: HOSPADM

## 2022-09-16 RX ORDER — HYDROCORTISONE 25 MG/G
CREAM TOPICAL 3 TIMES DAILY PRN
Status: DISCONTINUED | OUTPATIENT
Start: 2022-09-16 | End: 2022-09-17 | Stop reason: HOSPADM

## 2022-09-16 RX ORDER — ACETAMINOPHEN 325 MG/1
650 TABLET ORAL EVERY 4 HOURS PRN
Status: DISCONTINUED | OUTPATIENT
Start: 2022-09-16 | End: 2022-09-17 | Stop reason: HOSPADM

## 2022-09-16 RX ORDER — MISOPROSTOL 200 UG/1
400 TABLET ORAL
Status: DISCONTINUED | OUTPATIENT
Start: 2022-09-16 | End: 2022-09-17 | Stop reason: HOSPADM

## 2022-09-16 RX ORDER — BISACODYL 10 MG
10 SUPPOSITORY, RECTAL RECTAL DAILY PRN
Status: DISCONTINUED | OUTPATIENT
Start: 2022-09-16 | End: 2022-09-17 | Stop reason: HOSPADM

## 2022-09-16 RX ORDER — METHYLERGONOVINE MALEATE 0.2 MG/ML
200 INJECTION INTRAVENOUS
Status: DISCONTINUED | OUTPATIENT
Start: 2022-09-16 | End: 2022-09-17 | Stop reason: HOSPADM

## 2022-09-16 RX ADMIN — BUPIVACAINE HYDROCHLORIDE 6 ML: 2.5 INJECTION, SOLUTION EPIDURAL; INFILTRATION; INTRACAUDAL at 01:21

## 2022-09-16 RX ADMIN — LIDOCAINE HYDROCHLORIDE 20 ML: 10 INJECTION, SOLUTION EPIDURAL; INFILTRATION; INTRACAUDAL; PERINEURAL at 04:37

## 2022-09-16 RX ADMIN — IBUPROFEN 800 MG: 800 TABLET ORAL at 09:31

## 2022-09-16 RX ADMIN — ACETAMINOPHEN 650 MG: 325 TABLET ORAL at 05:49

## 2022-09-16 RX ADMIN — MISOPROSTOL 400 MCG: 200 TABLET ORAL at 05:14

## 2022-09-16 RX ADMIN — ACETAMINOPHEN 650 MG: 325 TABLET ORAL at 19:38

## 2022-09-16 RX ADMIN — Medication 340 ML/HR: at 04:33

## 2022-09-16 RX ADMIN — Medication: at 04:00

## 2022-09-16 RX ADMIN — DOCUSATE SODIUM 100 MG: 100 CAPSULE, LIQUID FILLED ORAL at 09:31

## 2022-09-16 RX ADMIN — Medication 100 ML/HR: at 08:06

## 2022-09-16 RX ADMIN — IBUPROFEN 800 MG: 800 TABLET, FILM COATED ORAL at 15:37

## 2022-09-16 ASSESSMENT — ACTIVITIES OF DAILY LIVING (ADL)
ADLS_ACUITY_SCORE: 20

## 2022-09-16 NOTE — LACTATION NOTE
"This note was copied from a baby's chart.  Consult for: Third baby, history of lower supply with second child who had a borderline tongue tie that was not revised. Did breastfeed for 15 months successfully just had to do extra pumping after every feed about a week after delivery and this was quite difficult for her, wants to avoid that this time.     History:  Vaginal delivery @ 39w2d, AGA @ 7# 9.3 ounce birthweight. Maternal history of asthma, elevated GCT but no GTT noted unable to tell from H&P if done elsewhere or if mom tracked BG at home instead? AMA @ 36 y/o, gestational thrombocytopenia platelets 122K on admission. First baby mom reports having great milk supply and baby with \"lots of rolls,\" gained weight excellent. Second child delivered in 30th percentile but went down to 10th and stayed there until starting on solids then quickly back up to 30th percentile. She continued breastfeeding until 15 months of age, did not need to use formula with either one but supply quickly dropped when went back to work.     Breast exam of mom: Soft, symmetric with sore, everted nipples bilaterally left one with tiny blister at 1 o'clock position and both slightly reddened at face. Arely  noted early tenderness & bilateral breast growth again during this pregnancy.      Oral exam of baby: Normal arch to palate, limited length of tongue palpable beyond attachment of lingual frenulum though is able to extend tip of tongue beyond lower alveolar ridge when sucking on finger and noted once sticking out past lips during visit.     Feeding assessment:  Mom latches Lia independently, minimal tips and assist to get on deeper for asymmetric latch with more of lower areola in mouth. Able to point out signs of milk transfer, mom denies pain with good latch is able to adjust when it is pinching. Slight crease at tip of nipple when she came off on right side.     Education provided: Discussed nose to nipple positioning with good " support, anatomy of breast and infant mouth, tips to get and maintain deeper latch, breast compressions prn to enhance milk transfer, nutritive vs. non-nutritive suck and listening for swallows, encouraged skin to skin and feeding on cue, supply and demand, benefits of and how to do breast massage & hand expression, hands on pumping. Show how to use both Initiate and Maintain functions of pump and when to switch. Encouraged pumping just 2=3 times/daily if desired to help boost supply and hand expressing frequently at least 4 to 6 times per day until milk is in. Mom would like to have more than she needs now even if pump dependent somewhat so she has a more robust supply when she returns to work. Reviewed how to tell when satiated and if getting enough, what to expect in the coming days, breastfeeding log with when and who to call if concerns and lactation resources for after discharge.    Feeding Plan: Frequent skin to skin when awake, breastfeed on cue 8 to 12 times per day. Encouraged hand expressing after most feedings/attempts until milk is in to help stimulate milk production. Mom concern for milk supply so optional hands on pumping 2-4 times/day cautioned to avoid significant over supply especially in first weeks after delivery Follow up with outpatient lactation consultant within a week of discharge to check in on supply and milk transfer since Lia's tongue seems similar to her second child who had difficulty for a short while transferring enough milk.

## 2022-09-16 NOTE — PROVIDER NOTIFICATION
09/16/22 1637   Provider Notification   Provider Name/Title G2 Looby   Method of Notification Electronic Page   Request Evaluate-Remote   Notification Reason Other   pt wants her PIV taken out. would you like to check Hgb before removing?   please advise RN. thanks

## 2022-09-16 NOTE — PLAN OF CARE
Goal Outcome Evaluation:    Plan of Care Reviewed With: patient, spouse     Overall Patient Progress: improving    VSS. Fundus midline, firm, and U/1. Lochia light. Pain adequately managed with tylenol and ibuprofen. Voiding without difficulty. Breastfeeding with minimal assist for deeper latch. Bonding well with . Continue with plan of care.

## 2022-09-16 NOTE — L&D DELIVERY NOTE
OB Vaginal Delivery Note    Arely Elena MRN# [id number]   Age: 37 year old YOB: 1985       GA: 39w2d  GP:   Delivery Type: Vaginal, Spontaneous   ROM to Delivery Time: (Delivered) Hours: 1 Minutes: 55  [place] Weight:     1 Minute 5 Minute 10 Minute   Apgar Totals: 7   8        LYNNETTE BYNUM;CM, DIANA R       L&D Delivery Note:     Arely Elena is a 37 year old now  who presented at 39w2d for IOL for AMA.  Pregnancy was notable for hx VAVD, hx SD, hx PPH, Rh negative. Her IOL began with pitocin. She was augmented with AROM.  Labor course was uncomplicated.  She progressed to complete and pushed for about 15 minutes, after which she had a spontaneous vaginal delivery of a viable female infant in OA position.  One tight nuchal cord was noted.  Cord additional wrapped around baby's left arm.  Apgars of 7 and 8 with weight pending.  The cord was double clamped after 60 seconds and cut.  A cord segment and cord blood were obtained.  IV pitocin was started. The placenta was then delivered using gentle traction and suprapubic pressure.  The uterus was noted to be firm after fundal massage.  The perineum was assessed for lacerations and shallow second degree laceration was noted. Local anesthetic infiltrated. The laceration was repaired in standard fashion using 3-0 vicryl suture.  On final examination of the perineum, the repair was noted to be hemostatic.  Total QBL was 224 ml.  The placenta appeared intact with a 3V umbilical cord.  Dr. Shruthi Keys was present for the entire procedure.     Lynnette Bynum MD  ObGyn, PGY-3  22 5:05 AM     Physician Attestation   I spent a total of 30 minutes with the patient, personally operating as the resident performed  and laceration repair.     Shruthi Keys MD  Date of Service (when I saw the patient): 22         Parvez Elena-Arely [[id number]]    Labor Event Times    Dilation complete date: 22 Complete time:  4:07 AM       Labor Length    2nd Stage (hrs): 0 (min): 24   3rd Stage (hrs): 0 (min): 4      Labor Events     labor?: No   steroids: None  Labor Type: Induction/Cervical ripening  Predominate monitoring during 1st stage: continuous electronic fetal monitoring     Antibiotics received during labor?: No     Rupture date/time: 22 0236   Rupture type: Artificial Rupture of Membranes  Fluid color: Clear  Fluid odor: Normal     Induction date/time:     Cervical ripening date/time: 9/15/22 2101   Indications for induction: Elective     Augmentation: Oxytocin, AROM  1:1 continuous labor support provided by?: RN Labor partogram used?: no      Delivery/Placenta Date and Time    Delivery Date: 22 Delivery Time:  4:31 AM   Placenta Date/Time: 2022  4:35 AM  Oxytocin given at the time of delivery: after delivery of baby  Delivering clinician: Shruthi Keys MD   Other personnel present at delivery:  Provider Role   Lynnette Celaya MD Resident   Ryann Modi, RN Delivery Nurse         Apgars    Living status: Living   1 Minute 5 Minute 10 Minute 15 Minute 20 Minute   Skin color: 1  1       Heart rate: 2  2       Reflex irritability: 1  2       Muscle tone: 1  1       Respiratory effort: 2  2       Total: 7  8       Apgars assigned by: MARK OLSEN RN     Cord    Vessels: 2 Vessels                Cord Blood Disposition: Discard    Gases Sent?: No       Labor Events and Shoulder Dystocia    Fetal Tracing Prior to Delivery: Category 2     Delivery (Maternal) (Provider to Complete) ([id number])    Episiotomy: None  Perineal lacerations: 2nd Repaired?: Yes      Blood Loss  Mother: KassyMelissaa MOISE #[id number]   Start of Mother's Information    Delivery Blood Loss  09/15/22 1631 - 22 0524    Delivery QBL (mL) Hospital Encounter 476 mL    Total  476 mL         End of Mother's Information  Mother: KassyArely #[id number]          Delivery - Provider to Complete ([id number])    Delivering clinician:  Shruthi Keys MD  Delivery Type (Choose the 1 that will go to the Birth History): Vaginal, Spontaneous                   Other personnel:  Provider Role   Lynnette Celaya MD Resident   Ryann Modi, RN Delivery Nurse                Placenta    Date/Time: 9/16/2022  4:35 AM           Anesthesia    Method: Epidural  Cervical dilation at placement: 4-7                Presentation and Position    Presentation: Vertex     Occiput Anterior                 Shruthi Keys MD

## 2022-09-16 NOTE — ANESTHESIA PREPROCEDURE EVALUATION
Anesthesia Pre-Procedure Evaluation    Patient: Arely Elena   MRN: [id number] : 1985        Procedure :           Past Medical History:   Diagnosis Date     Asthma      Varicella       Past Surgical History:   Procedure Laterality Date     FOOT SURGERY      cyst removal     wisdom tooth removal        Allergies   Allergen Reactions     Sulfa Drugs      Penicillins Other (See Comments) and Rash     Unknown reaction        Social History     Tobacco Use     Smoking status: Never Smoker     Smokeless tobacco: Never Used   Substance Use Topics     Alcohol use: Not Currently      Wt Readings from Last 1 Encounters:   22 69.9 kg (154 lb)        Anesthesia Evaluation   Pt has had prior anesthetic.     No history of anesthetic complications       ROS/MED HX  ENT/Pulmonary:     (+) Intermittent, asthma     Neurologic:  - neg neurologic ROS     Cardiovascular:  - neg cardiovascular ROS     METS/Exercise Tolerance: >4 METS    Hematologic:  - neg hematologic  ROS     Musculoskeletal:  - neg musculoskeletal ROS     GI/Hepatic:  - neg GI/hepatic ROS     Renal/Genitourinary:  - neg Renal ROS     Endo:  - neg endo ROS     Psychiatric/Substance Use:  - neg psychiatric ROS     Infectious Disease:  - neg infectious disease ROS     Malignancy:  - neg malignancy ROS     Other:      (+) Possibly pregnant, ,            OUTSIDE LABS:  CBC:   Lab Results   Component Value Date    WBC 13.7 (H) 09/15/2022    WBC 11.5 (H) 2022    HGB 14.3 09/15/2022    HGB 12.9 2022    HCT 41.7 09/15/2022    HCT 41.8 2022     (L) 09/15/2022     2022     BMP: No results found for: NA, POTASSIUM, CHLORIDE, CO2, BUN, CR, GLC  COAGS: No results found for: PTT, INR, FIBR  POC:   Lab Results   Component Value Date    HCG Positive (A) 10/31/2019     HEPATIC: No results found for: ALBUMIN, PROTTOTAL, ALT, AST, GGT, ALKPHOS, BILITOTAL, BILIDIRECT, PAXTON  OTHER:   Lab Results   Component Value Date    TSH 2.21  09/24/2019       Anesthesia Plan    ASA Status:  2      Anesthesia Type: Epidural.              Consents            Postoperative Care            Comments:                Danyell Carbajal MD

## 2022-09-16 NOTE — H&P
L&D History and Physical   September 15, 2022  Roque Elena  [id number]    Admission Date: 9/15/2022   PCP: No Ref-Primary, Physician     HPI: Roque Elena is a 37 year old  at 39w1d by LMP c/w 8w5d us, here for induction of labor for AMA.    Patient states that she is feeling well today.  She had some on and off contractions after sweep in clinic. Denies any leaking fluid.     Pregnancy notable for:  - AMA  - Rh negative  -History of VAVD, shoulder dystocia, PPH  -desire for permanent sterilization if need for  section  -Asthma  -gTCP    OBHX:   OB History    Para Term  AB Living   4 2 2 0 1 2   SAB IAB Ectopic Multiple Live Births   1 0 0 0 2      # Outcome Date GA Lbr Keven/2nd Weight Sex Delivery Anes PTL Lv   4 Current            3 SAB 2021           2 Term 20 39w0d 06:20 / 00:15 3.118 kg (6 lb 14 oz) M Vag-Spont EPI N ODILON      Name: LANA PERALTA,MALE-ROQUE      Apgar1: 9  Apgar5: 9   1 Term 16 40w6d  3.799 kg (8 lb 6 oz) M Vag-Vacuum EPI N ODILON       MedicalHX:   Past Medical History:   Diagnosis Date     Asthma      Varicella        SurgicalHX:   Past Surgical History:   Procedure Laterality Date     FOOT SURGERY      cyst removal     wisdom tooth removal         Medications:   bupivacaine (MARCAINE) 0.125 % injection (diluted from stock concentration by MD or CRNA)  fentaNYL (SUBLIMAZE) 2 mcg/mL, bupivacaine (MARCAINE) 0.125% in NaCl 0.9% EPIDURAL infusion  lidocaine-EPINEPHrine 1.5 %-1:[id number] injection    albuterol (PROAIR HFA/PROVENTIL HFA/VENTOLIN HFA) 108 (90 Base) MCG/ACT inhaler, Inhale 2 puffs into the lungs every 6 hours as needed for shortness of breath / dyspnea or wheezing  loratadine (CLARITIN) 10 MG tablet, Take 10 mg by mouth daily  ondansetron (ZOFRAN-ODT) 4 MG ODT tab, Take 1 tablet (4 mg) by mouth every 8 hours as needed for nausea  Prenatal Vit-Fe Fumarate-FA (PRENATAL MULTIVITAMIN W/IRON) 27-0.8 MG tablet, Take 1 tablet by mouth  daily        Allergies:   Allergies   Allergen Reactions     Sulfa Drugs      Penicillins Other (See Comments) and Rash     Unknown reaction         SocialHX:   Social History     Socioeconomic History     Marital status:    Tobacco Use     Smoking status: Never Smoker     Smokeless tobacco: Never Used   Vaping Use     Vaping Use: Never used   Substance and Sexual Activity     Alcohol use: Not Currently     Drug use: Never     Sexual activity: Yes     Partners: Male     /74 P 62    Physical Exam:  GEN: resting comfortably in bed, no acute distress  CV: regular rate   PULM: breathing comfortably on room air  ABD: soft, gravid, non-tender, non-distended  EXT: trace edema  CVX: in clinic check on 22 3/60/-2/medium/mid  Presentation: cephalic by BSUS  EFW: 7.5 lbs    NST:  FHT: baseline 132, moderate variability, + accels, no decels  TOCO: 0-1 contractions/ 10 minutes       Lab Results   Component Value Date    ABO A 2020    RH Neg 2020    AS Negative 2022    HEPBANG Nonreactive 2022    CHPCRT Negative 2019    GCPCRT Negative 2019    HGB 14.3 09/15/2022       GBS Status:   Lab Results   Component Value Date    GBS Negative 2020       Lab Results   Component Value Date    PAP NIL 2019       A/P: Arely Elena is a 37 year old  at 39w1d by LMP c/w 8w5d us, here for induction of labor for AMA.    Induction of Labor:  Peterson score of 7.  Discussed starting with IV pitocin.  When regular contraction pattern, then patient plans epidural and is amenable to AROM.    GBS negative    Gestational thrombocytopenia:  Admission plt 122    Asthma:  Avoid hemabate    FWB: Category 1, reactive     History of PPH:  Normal starting Hgb  Type and screen on admission.  Plan for misoprostol, methergine, and TXA if higher than average bleeding after delivery.    Rh negative:  Plan for rhogam eval postpartum    Shruthi Keys MD  9/15/2022 8:48 PM

## 2022-09-16 NOTE — DISCHARGE SUMMARY
[facility] Discharge Summary    Arely Elena MRN# [id number]   Age: 37 year old YOB: 1985     Date of Admission:  9/15/2022  Date of Discharge::  2022  Admitting Physician:  Shruthi Keys MD  Discharge Physician:  Codi Case MD             Admission Diagnoses:   -   - IUP at 39w2d  - Hx VAVD  - Hx SD  - Hx PPH  - Rh neg          Discharge Diagnosis:   - Postpartum, s/p delivery of viable infant  - Same as above          Procedures:     Procedure(s): - Normal spontaneous vaginal delivery  - Epidural anesthesia               Medications Prior to Admission:     Medications Prior to Admission   Medication Sig Dispense Refill Last Dose     albuterol (PROAIR HFA/PROVENTIL HFA/VENTOLIN HFA) 108 (90 Base) MCG/ACT inhaler Inhale 2 puffs into the lungs every 6 hours as needed for shortness of breath / dyspnea or wheezing 1 Inhaler 3      loratadine (CLARITIN) 10 MG tablet Take 10 mg by mouth daily        Prenatal Vit-Fe Fumarate-FA (PRENATAL MULTIVITAMIN W/IRON) 27-0.8 MG tablet Take 1 tablet by mouth daily        [DISCONTINUED] ondansetron (ZOFRAN-ODT) 4 MG ODT tab Take 1 tablet (4 mg) by mouth every 8 hours as needed for nausea 30 tablet 4              Discharge Medications:        Review of your medicines      START taking      Dose / Directions   acetaminophen 325 MG tablet  Commonly known as: TYLENOL  Used for:  (normal spontaneous vaginal delivery)      Dose: 650 mg  Take 2 tablets (650 mg) by mouth every 6 hours as needed for mild pain Start after Delivery.  Quantity: 100 tablet  Refills: 0     ibuprofen 600 MG tablet  Commonly known as: ADVIL/MOTRIN  Used for:  (normal spontaneous vaginal delivery)      Dose: 600 mg  Take 1 tablet (600 mg) by mouth every 6 hours as needed for moderate pain Start after delivery  Quantity: 60 tablet  Refills: 0     senna-docusate 8.6-50 MG tablet  Commonly known as: SENOKOT-S/PERICOLACE  Used for:  (normal spontaneous vaginal  delivery)      Dose: 1 tablet  Take 1 tablet by mouth daily Start after delivery.  Quantity: 100 tablet  Refills: 0        CONTINUE these medicines which have NOT CHANGED      Dose / Directions   albuterol 108 (90 Base) MCG/ACT inhaler  Commonly known as: PROAIR HFA/PROVENTIL HFA/VENTOLIN HFA  Used for: Mild intermittent asthma without complication      Dose: 2 puff  Inhale 2 puffs into the lungs every 6 hours as needed for shortness of breath / dyspnea or wheezing  Quantity: 1 Inhaler  Refills: 3     loratadine 10 MG tablet  Commonly known as: CLARITIN      Dose: 10 mg  Take 10 mg by mouth daily  Refills: 0     prenatal multivitamin w/iron 27-0.8 MG tablet      Dose: 1 tablet  Take 1 tablet by mouth daily  Refills: 0        STOP taking    ondansetron 4 MG ODT tab  Commonly known as: ZOFRAN ODT              Where to get your medicines      These medications were sent to [facility] [address]  [address] [address]    Phone: [phone]     acetaminophen 325 MG tablet    ibuprofen 600 MG tablet    senna-docusate 8.6-50 MG tablet               Consultations:   Anesthesiology            Brief History of Admission and Labor:   Arely Elena is a 37 year old now  who presented at 39w1d for elective IOL.       Pregnancy was notable for hx VAVD, hx SD, hx PPH, Rh negative. Her IOL began with pitocin. She was augmented with AROM.  Labor course was uncomplicated.  She progressed to complete and pushed for about 15 minutes, after which she had a spontaneous vaginal delivery of a viable female infant in OA position.  One tight nuchal cord was noted.  Apgars of 7 and 8 with weight 3440 g.  The cord was double clamped after 60 seconds and cut.  A cord segment and cord blood were obtained.  IV pitocin was started. The placenta was then delivered using gentle traction and suprapubic pressure.  The uterus was noted to be firm after fundal massage.  The  perineum was assessed for lacerations and shallow second degree laceration was noted. Local anesthetic infiltrated. The laceration was repaired in standard fashion using 3-0 vicryl suture.  On final examination of the perineum, the repair was noted to be hemostatic.  Total QBL was 224 ml.  The placenta appeared intact with a 3V umbilical cord.        Postpartum Hospital Course:   The patient's postpartum course was unremarkable.  On discharge, her pain was well controlled. Vaginal bleeding is similar to peak menstrual flow.  Voiding without difficulty.  Ambulating well and tolerating a normal diet.  No fever.  Breastfeeding well.  Infant is stable.  She was discharged on post-partum day #1.    Post-partum hemoglobin: 11.3    Contraception: declined    Rhogam was not indicated, baby was also Rh negative          Discharge Instructions and Follow-Up:     Discharge diet: Regular   Discharge activity: Activity as tolerated   Discharge follow-up: Follow up with your primary OBGYN provider in six weeks for a routine postpartum visit and [facility] hospital home health RN was placed    Wound care: Drink plenty of fluids  Ice to area for comfort  Keep wound clean and dry            Discharge Disposition:     Discharged to home      Attestation:  I have reviewed today's vital signs, notes, medications, labs and imaging.    Kathie Ryan MD  OB/GYN Resident PGY-3  9:18 AM September 17, 2022

## 2022-09-16 NOTE — PROGRESS NOTES
Labor Progress Note     S: very comfortable with epidural. Desires AROM.    O:  /61   Temp 98.3  F (36.8  C)   Resp 16   LMP 12/15/2021   SpO2 97%   General: NAD  VE: 4-5/80/-2    FHT: Baseline 130, moderate variability, accelerations present, no decelerations  TOCO: 3-4 contractions in ten minutes    A/P: 37 year old  at 39w2d, here for elective IOL. Pregnancy notable for hx VAVD c/b SD, hx PPH, Rh negative status.    - Induction: Continue pitocin, titrate per protocol. Now s/p AROM for clear fluids.  - FWB: Category I, reactive and reassuring.  - Pain: epidural in place  - GBS: negative, no abx indicated    Anticipate     Lynnette Celaya MD  ObGyn, PGY-3  2022 2:37 AM

## 2022-09-16 NOTE — PLAN OF CARE
Patient arrived to [facility] unit via wheelchair at 0710,with belongings, accompanied by spouse/ significant other, with infant in arms. Received report from Ryann BROWNING at 0615 with updates at the bedside and checked bands. Unit and room orientation started. Call light given; no concerns present at this time. Continue with plan of care.     Handoff then was given to Pat BROWNING at 0625.

## 2022-09-16 NOTE — ANESTHESIA PROCEDURE NOTES
Epidural catheter Procedure Note    Pre-Procedure   Staff -        Anesthesiologist:  Judith Boyce MD       Performed By: anesthesiologist       Pre-Anesthestic Checklist: patient identified, IV checked, risks and benefits discussed, informed consent, monitors and equipment checked, pre-op evaluation, at physician/surgeon's request and post-op pain management  Timeout:       Correct Patient: Yes        Correct Procedure: Yes        Correct Site: Yes        Correct Position: Yes   Procedure Documentation  Procedure: epidural catheter       Patient Position: sitting       Skin prep: Chloraprep       Local skin infiltrated with 3 mL of 1% lidocaine.        Insertion Site: L3-4. (midline approach).       Technique: LORT saline        PAU at 4.5 cm.       Needle Type: ToGFS ITy needle       Needle Gauge: 17.        Catheter: 19 G.          Catheter threaded easily.         4.5 cm epidural space.         Threaded 9 cm at skin.         # of attempts: 1 and  # of redirects:  0    Assessment/Narrative         Paresthesias: No.       Test dose of 3 mL at 00:55 CDT.        .       Insertion/Infusion Method: LORT saline       Aspiration negative for Heme or CSF via Epidural Catheter.       Sensory Level Left: T10.       Sensory Level Right: T10.    Medication(s) Administered   0.125% bupivacaine 5 mL + fentanyl 20 mcg + NS 5 mL (Epidural) (Mixture components: bupivacaine HCl (PF) 0.25 % Soln, 5 mL; fentaNYL (PF) 100 MCG/2ML Soln, 20 mcg; sodium chloride 0.9 % Soln, 5 mL) - EPIDURAL   6 mL - 9/16/2022 1:21:00 AM

## 2022-09-16 NOTE — PLAN OF CARE
Vaginal Delivery Note   of viable Female with Dr. Keys and Dr. Celaya in attendance.  Nursery RN Ella present.  Infant with spontaneous cry, to mother's abdomen, dried and stimulated.  APGAR at 1 minute:  7 and APGAR at 5 minutes:  8.  Placenta delivered with out complication,  2nd degree laceration, with repair, marielena cares provided.  Mother and baby in stable condition.  Goal Outcome Evaluation:  Anticipate transfer to [facility] in 2 hours if stable.

## 2022-09-17 VITALS
TEMPERATURE: 99.2 F | RESPIRATION RATE: 16 BRPM | OXYGEN SATURATION: 94 % | SYSTOLIC BLOOD PRESSURE: 116 MMHG | DIASTOLIC BLOOD PRESSURE: 69 MMHG | HEART RATE: 69 BPM

## 2022-09-17 LAB — HGB BLD-MCNC: 11.3 G/DL (ref 11.7–15.7)

## 2022-09-17 PROCEDURE — 36415 COLL VENOUS BLD VENIPUNCTURE: CPT | Performed by: STUDENT IN AN ORGANIZED HEALTH CARE EDUCATION/TRAINING PROGRAM

## 2022-09-17 PROCEDURE — 250N000013 HC RX MED GY IP 250 OP 250 PS 637: Performed by: STUDENT IN AN ORGANIZED HEALTH CARE EDUCATION/TRAINING PROGRAM

## 2022-09-17 PROCEDURE — 85018 HEMOGLOBIN: CPT | Performed by: STUDENT IN AN ORGANIZED HEALTH CARE EDUCATION/TRAINING PROGRAM

## 2022-09-17 RX ORDER — AMOXICILLIN 250 MG
1 CAPSULE ORAL DAILY
Qty: 100 TABLET | Refills: 0 | Status: SHIPPED | OUTPATIENT
Start: 2022-09-17 | End: 2023-11-13

## 2022-09-17 RX ORDER — ACETAMINOPHEN 325 MG/1
650 TABLET ORAL EVERY 6 HOURS PRN
Qty: 100 TABLET | Refills: 0 | Status: SHIPPED | OUTPATIENT
Start: 2022-09-17 | End: 2022-10-28

## 2022-09-17 RX ORDER — IBUPROFEN 600 MG/1
600 TABLET, FILM COATED ORAL EVERY 6 HOURS PRN
Qty: 60 TABLET | Refills: 0 | Status: SHIPPED | OUTPATIENT
Start: 2022-09-17 | End: 2022-10-28

## 2022-09-17 RX ADMIN — IBUPROFEN 800 MG: 800 TABLET, FILM COATED ORAL at 00:32

## 2022-09-17 RX ADMIN — DOCUSATE SODIUM 100 MG: 100 CAPSULE, LIQUID FILLED ORAL at 06:38

## 2022-09-17 RX ADMIN — ACETAMINOPHEN 650 MG: 325 TABLET ORAL at 06:37

## 2022-09-17 ASSESSMENT — ACTIVITIES OF DAILY LIVING (ADL)
ADLS_ACUITY_SCORE: 20

## 2022-09-17 NOTE — PROGRESS NOTES
Post Partum Progress Note  PPD#1    Subjective:  She is resting comfortably in bed this morning. She has no complaints but is asking about getting her IV to go home today. Pain is well controlled on current medication regimen. Lochia present and appropriate.  She is voiding without difficulty and passing flatus. She is ambulating without dizziness or difficulty.  She denies headache, changes in vision, nausea/vomiting, chest pain, shortness of breath, RUQ pain, or worsening edema. Is breast feeding without issues. Declines birth control prior to discharge.    Objective:  Vitals:    22 0748 22 1204 22 1538 22 0031   BP: 125/86 114/74 114/60 112/65   BP Location:   Left arm Left arm   Patient Position:   Semi-Atwood's Semi-Atwood's   Cuff Size:   Adult Regular Adult Regular   Pulse: 67 57 65 62   Resp:    Temp: 98.8  F (37.1  C) 99.3  F (37.4  C) 98.4  F (36.9  C) 97.8  F (36.6  C)   TempSrc: Oral Oral Oral Oral   SpO2: 94%        General: NAD, resting comfortably  CV: warm, well perfused.   Pulm: normal respiratory effort  Abd: soft, non-tender, non-distended. Fundus is firm and below the umbilicus.    Ext: trace lower extremity edema bilaterally. No calf tenderness.    Assessment/Plan:  Arely Elena is a 37 year old  female who is PPD#1 s/p  (0431). Pregnancy c/b hx of PPH, Rh negative.    - PNC: Rh neg. Rubella immune. Rhogam evaluation pending.  - Pain: well controlled on oral medications  - Heme: Hgb 14.3> > 11.3  - GI: continue anti-emetics and stool softeners as needed.  - : voiding spontaneously without difficulty  - Feeding: breast feeding  - BC: declined prior to discharge    Anticipate discharge to home later today on PPD#1 pending clinical course.    Nadege Funk MD  PGY-1 OBGYN Resident  2022 5:58 AM     Patient seen and examined by me, agree with above resident note. Overall doing well and meeting goals.  Desires discharge today if baby  is able to be discharged. Has all pp meds at home.  Instructions given.  RTC 6 weeks    DANIEL WHITLEY MD

## 2022-09-17 NOTE — PLAN OF CARE
Goal Outcome Evaluation:    Plan of Care Reviewed With: spouse, patient     Overall Patient Progress: improving  VSS and postpartum assessments WDL. Fundus firm at U/2. Scant lochia. Voiding without difficulty and tolerating regular diet. Up and ambulating with steady gait.  Independent with cares.  Bonding well with infant. Breastfeeding on cue independently. Pain managed with tylenol and Ibuprofen. Will continue with postpartum cares and education per plan of care.

## 2022-09-17 NOTE — PLAN OF CARE
VSS on RA. Up ad fabrice. Fundus firm at 2 cm below umbilicus. Lochia scant. Voids spontaneously. Passing flatus, denies nausea. Cramping pain managed with Ibuprofen, declined Tylenol. Independent with breast feeding. Continue plan of care.   Vitals:    09/16/22 0748 09/16/22 1204 09/16/22 1538 09/17/22 0031   BP: 125/86 114/74 114/60 112/65   BP Location:   Left arm Left arm   Patient Position:   Semi-Atwood's Semi-Atwood's   Cuff Size:   Adult Regular Adult Regular   Pulse: 67 57 65 62   Resp: 16 14 16 16   Temp: 98.8  F (37.1  C) 99.3  F (37.4  C) 98.4  F (36.9  C) 97.8  F (36.6  C)   TempSrc: Oral Oral Oral Oral   SpO2: 94%

## 2022-09-18 ENCOUNTER — HEALTH MAINTENANCE LETTER (OUTPATIENT)
Age: 37
End: 2022-09-18

## 2022-09-19 ENCOUNTER — MEDICAL CORRESPONDENCE (OUTPATIENT)
Dept: HEALTH INFORMATION MANAGEMENT | Facility: CLINIC | Age: 37
End: 2022-09-19

## 2022-10-17 ENCOUNTER — LAB (OUTPATIENT)
Dept: URGENT CARE | Facility: URGENT CARE | Age: 37
End: 2022-10-17
Attending: FAMILY MEDICINE
Payer: COMMERCIAL

## 2022-10-17 DIAGNOSIS — Z20.822 SUSPECTED 2019 NOVEL CORONAVIRUS INFECTION: ICD-10-CM

## 2022-10-17 PROCEDURE — U0003 INFECTIOUS AGENT DETECTION BY NUCLEIC ACID (DNA OR RNA); SEVERE ACUTE RESPIRATORY SYNDROME CORONAVIRUS 2 (SARS-COV-2) (CORONAVIRUS DISEASE [COVID-19]), AMPLIFIED PROBE TECHNIQUE, MAKING USE OF HIGH THROUGHPUT TECHNOLOGIES AS DESCRIBED BY CMS-2020-01-R: HCPCS

## 2022-10-17 PROCEDURE — 99207 PR NO CHARGE LOS: CPT

## 2022-10-17 PROCEDURE — U0005 INFEC AGEN DETEC AMPLI PROBE: HCPCS

## 2022-10-18 LAB — SARS-COV-2 RNA RESP QL NAA+PROBE: NEGATIVE

## 2022-10-25 ENCOUNTER — MEDICAL CORRESPONDENCE (OUTPATIENT)
Dept: HEALTH INFORMATION MANAGEMENT | Facility: CLINIC | Age: 37
End: 2022-10-25

## 2022-10-28 ENCOUNTER — PRENATAL OFFICE VISIT (OUTPATIENT)
Dept: OBGYN | Facility: CLINIC | Age: 37
End: 2022-10-28
Payer: COMMERCIAL

## 2022-10-28 VITALS
OXYGEN SATURATION: 97 % | WEIGHT: 139.9 LBS | HEART RATE: 67 BPM | DIASTOLIC BLOOD PRESSURE: 73 MMHG | SYSTOLIC BLOOD PRESSURE: 108 MMHG | BODY MASS INDEX: 22.58 KG/M2

## 2022-10-28 DIAGNOSIS — N95.2 VAGINAL ATROPHY: ICD-10-CM

## 2022-10-28 DIAGNOSIS — J45.20 MILD INTERMITTENT ASTHMA WITHOUT COMPLICATION: ICD-10-CM

## 2022-10-28 DIAGNOSIS — N81.11 CYSTOCELE, MIDLINE: ICD-10-CM

## 2022-10-28 PROCEDURE — 99207 PR POST PARTUM EXAM: CPT | Performed by: OBSTETRICS & GYNECOLOGY

## 2022-10-28 RX ORDER — ESTRADIOL 0.1 MG/G
CREAM VAGINAL
Qty: 42.5 G | Refills: 0 | Status: SHIPPED | OUTPATIENT
Start: 2022-10-28 | End: 2023-11-13

## 2022-10-28 RX ORDER — ALBUTEROL SULFATE 90 UG/1
2 AEROSOL, METERED RESPIRATORY (INHALATION) EVERY 6 HOURS PRN
Qty: 18 G | Refills: 3 | Status: SHIPPED | OUTPATIENT
Start: 2022-10-28

## 2022-10-28 ASSESSMENT — ASTHMA QUESTIONNAIRES
QUESTION_2 LAST FOUR WEEKS HOW OFTEN HAVE YOU HAD SHORTNESS OF BREATH: NOT AT ALL
QUESTION_4 LAST FOUR WEEKS HOW OFTEN HAVE YOU USED YOUR RESCUE INHALER OR NEBULIZER MEDICATION (SUCH AS ALBUTEROL): NOT AT ALL
QUESTION_5 LAST FOUR WEEKS HOW WOULD YOU RATE YOUR ASTHMA CONTROL: COMPLETELY CONTROLLED
ACT_TOTALSCORE: 25
ACUTE_EXACERBATION_TODAY: NO
ACT_TOTALSCORE: 25
QUESTION_3 LAST FOUR WEEKS HOW OFTEN DID YOUR ASTHMA SYMPTOMS (WHEEZING, COUGHING, SHORTNESS OF BREATH, CHEST TIGHTNESS OR PAIN) WAKE YOU UP AT NIGHT OR EARLIER THAN USUAL IN THE MORNING: NOT AT ALL
QUESTION_1 LAST FOUR WEEKS HOW MUCH OF THE TIME DID YOUR ASTHMA KEEP YOU FROM GETTING AS MUCH DONE AT WORK, SCHOOL OR AT HOME: NONE OF THE TIME

## 2022-10-28 NOTE — PROGRESS NOTES
Arely is here for a 6-week postpartum checkup.    She had a  of a viable girl, weight 7 pounds 9 oz., with no complications.  Since delivery, she has been breast feeding.  She has no signs of infection, bleeding or other complications.  She is not pregnant.  We discussed contraception and she has chosen Mirena IUD.  Then vasectomy  Mood is good.  Today's Depression Rating was   PHQ-9 SCORE 2022   PHQ-9 Total Score 3       EXAM:  Vitals:    10/28/22 0947   BP: 108/73   Pulse: 67   SpO2: 97%   Weight: 63.5 kg (139 lb 14.4 oz)     HEENT: grossly normal.  NECK: no lymphadenopathy or thyroidomegaly.  BREASTS: symmetric, no masses or discharge  BACK: No spinal or CVA tenderness.  ABDOMEN: soft, non tender, good bowel sounds, without masses rebound, guarding or tenderness.    PELVIC:    External genitalia: normal without lesion, repair well healed.                            Vagina: normal mucosa and rugae, no discharge. Anterior wall prolapse present. Urethral caruncle present.   Cervix: multiparous, well healed, without lesion.  Uterus: non pregnant in size, firm , mobile, no lesions.  Adnexa: non tender, without masses    EXTREMITIES: Warm to touch, good pulses, no ankle edema or calf tenderness.  NEUROLOGIC: grossly normal.    ASSESSMENT:   Normal 6-week postpartum exam after .    PLAN:  Pap smear Due  and Mirena IUD for contraception at next visit  Estrace for urethral caruncle. Desires PT for prolapse.     (Z39.2) Routine postpartum follow-up  (primary encounter diagnosis)  Comment:   Plan: Doing well.   RTC for IUD placement     (J45.20) Mild intermittent asthma without complication  Comment:   Plan: albuterol (PROAIR HFA/PROVENTIL HFA/VENTOLIN         HFA) 108 (90 Base) MCG/ACT inhaler        URIs exacerbate    (N81.11) Cystocele, midline  Comment:   Plan: Physical Therapy Referral            (N95.2) Vaginal atrophy  Comment:   Plan: estradiol (ESTRACE) 0.1 MG/GM vaginal cream

## 2022-11-13 NOTE — PROGRESS NOTES
Physical Therapy Initial Examination/Evaluation  November 14, 2022    Arely Elena is a 37 year old female referred to physical therapy by Corrine Kulkarni MD for treatment of cystocele  with Precautions/Restrictions/MD instructions evaluate and treat.     Therapist Impression:   Patient has complaints of bulging sensation in vagina. She reported increased urinary frequency and bulging sensation that started after giving birth to second child two years ago. She went to PT and frequency decreased as well as the bulging sensation. Gave birth about 8 weeks ago and feels that the bulge sensation is definitively back. She reports feeling it when walking and some days it is worse than others. She reports some days noticing that sensation right away, others will not notice it until after walking.  Patient found to have decreased PF strength and proprioceptive awareness of PF activation, decreased core stability and SRIKANTH. Patient given HEP with DBE, contract/relax with TrA and cat/cow.     Subjective:  DOI/onset: 10/28/22 - referral date   Related PMH: 3 vaginal deliveries    Chief Complaint/Functional Limitations:  Bulging sensation in vagina after walking, sometimes worse day by day and feels the sensation just while standing and see below in therapy evaluation codes   Occupation: MD   Job duties: keyboarding/computer use, prolonged standing  Current HEP/exercise regimen: walking   Patient's goals are see chief complaints decrease bulging sensation while walking.     Other pertinent PMH/Red Flags: Asthma   Barriers at home/work: None as reported by patient  Pertinent Surgical History: foot bone cyst 2004  Medications: topical estrogen   General health as reported by patient: good      Urination:  Do you leak on the way to the bathroom or with a strong urge to void? No    Do you leak with cough,sneeze, jumping, running? Yes - with full bladder, feels bulge with this   Any other activities that cause leaking? No    Do you have triggers that make you feel you can't wait to go to the bathroom? No.  Type of pad and number used per day?  None   When you leak what is the amount?  None     How often do you use the bathroom? Every 2-3 hours   How many times do you get up to urinate at night? 1x - gets up to feed not to use the bathroom but will use the bathroom    Can you stop the flow of urine when on the toilet? Hasn't tried     Do you strain to pass urine? No  Do you have a slow or hesitant urinary stream? Yes - at the beginning   Do you have difficulty initiating the urine stream? No  Is urination painful?  No    How many bladder infections have you had in last 12 months? 0     Fluid intake(one glass is 8oz or one cup) 7-8 glasses water/day, 1-2 caffinated glasses/day  1-2 glasses of carbonated water       Bowel habits:  Frequency of bowel movements? 1x/day  Consistancy of stool? soft  Do you ignore the urge to defecate? No  Do you strain to pass stool? Yes - every once in a while; will take stool softener to help with this     Pelvic Pain:  Do you have any pelvic pain with intercourse, exams, use of tampons? No - hasn't resumed intercourse       Given birth? Yes Any complications? Yes; 2nd degree perineal tear with all three   # of vaginal delieveries? 3    # of C-sections? 0    # of episiotomies? 0   Are you sexually active?Yes  Have you ever been worried for your physical safety? No  Any abdominal or pelvic surgeries? None     OBSERVATION    Static Posture  Standing posture: Lumbar lordosis and thoracic kyphosis   Sitting posture: Good    Pelvic symmetry: Negative  Introitus: loose  Trendelenberg Sign:Positive RLE     ROM  Lumbar AROM: WNL and pain free   Single leg standing unilateral hip flexion PSIS:Negative  Standing forward flexion PSIS:Negative  Passive Hip ROM:Negative  AYO:Negative  AYO with OP:Negative    Hip and Knee Strength   MMT Left Right   Hip Flexion 4/5 4/5   Hip Extension 4+/5 4+/5   Hip Abduction 4/5  4/5   Hip ER 4/5 4/5   Hip IR 4-/5 4-/5       MUSCLE PERFORMANCE  Double limb squat observations: Anterior knee translation and Able to perform full deep squat without pain  Active SLR:Negative - moderate pelvic rotation B   SRIKANTH: 2 finger width separation below umbilicus, 1.5 finger width separation above     Baseline PF tone:hyper  PF Tone with cough: hyper  Valsalva: bulge  PF Response quality: sluggish  PF Power: Center: 3 Stronger on right/left right .  Endurance: Maximum contraction in seconds: 10  # of endurance contractions before fatigue: 4  Quick contraction repetitions prior to fatigue: 6.  Specificity/accessory muscles: WNL/WFL, Synergy with abdominals: WNL/WFL.  Prolapse: Cyctocele/Rectocele/Uterine ndgndrndanddndend:nd nd2nd Urethrocele: none, Enterocele: none.  Pelvic Floor Lengthening: minimal PF lengthening   Diaphragmatic Breathing: Minimal PF movement with breathing - improved with cues     PALPATION: Pain: levator ani, bulbocavernosus and transverse perineal muscle        Assessment/Plan:  Patient is a 37 year old female with pelvic complaints.    Patient has the following significant findings with corresponding treatment plan.                Diagnosis 1:  Cystocele, midline   Pain -  manual therapy, self management, education and home program  Decreased strength - therapeutic exercise, therapeutic activities and home program  Impaired balance - neuro re-education, gait training, therapeutic activities and home program  Decreased proprioception - neuro re-education, therapeutic activities and home program  Decreased function - therapeutic activities and home program    Therapy Evaluation Codes:   1) History comprised of:   Personal factors that impact the plan of care:      None.    Comorbidity factors that impact the plan of care are:      Asthma.     Medications impacting care: None.  2) Examination of Body Systems comprised of:   Body structures and functions that impact the plan of care:       Pelvis.   Activity limitations that impact the plan of care are:      Jumping, Standing and Walking.  3) Clinical presentation characteristics are:   Stable/Uncomplicated.  4) Decision-Making    Low complexity using standardized patient assessment instrument and/or measureable assessment of functional outcome.  Cumulative Therapy Evaluation is: Low complexity.    Previous and current functional limitations:  (See Goal Flow Sheet for this information)    Short term and Long term goals: (See Goal Flow Sheet for this information)     Communication ability:  Patient appears to be able to clearly communicate and understand verbal and written communication and follow directions correctly.  Treatment Explanation - The following has been discussed with the patient:   RX ordered/plan of care  Anticipated outcomes  Possible risks and side effects  This patient would benefit from PT intervention to resume normal activities.   Rehab potential is good.    Frequency:  1 X week, once daily  Duration:  for 4 weeks tapering to every other X a week over 8 weeks  Discharge Plan:  Achieve all LTG.  Independent in home treatment program.  Reach maximal therapeutic benefit.    Please refer to the daily flowsheet for treatment today, total treatment time and time spent performing 1:1 timed codes.

## 2022-11-14 ENCOUNTER — THERAPY VISIT (OUTPATIENT)
Dept: PHYSICAL THERAPY | Facility: CLINIC | Age: 37
End: 2022-11-14
Attending: OBSTETRICS & GYNECOLOGY
Payer: COMMERCIAL

## 2022-11-14 DIAGNOSIS — R27.9 LACK OF COORDINATION: ICD-10-CM

## 2022-11-14 DIAGNOSIS — N81.11 CYSTOCELE, MIDLINE: ICD-10-CM

## 2022-11-14 DIAGNOSIS — M62.89 PELVIC FLOOR DYSFUNCTION IN FEMALE: Primary | ICD-10-CM

## 2022-11-14 PROCEDURE — 97140 MANUAL THERAPY 1/> REGIONS: CPT | Mod: GP

## 2022-11-14 PROCEDURE — 97535 SELF CARE MNGMENT TRAINING: CPT | Mod: GP

## 2022-11-14 PROCEDURE — 97161 PT EVAL LOW COMPLEX 20 MIN: CPT | Mod: GP

## 2022-11-14 PROCEDURE — 97112 NEUROMUSCULAR REEDUCATION: CPT | Mod: GP

## 2022-11-17 ENCOUNTER — MEDICAL CORRESPONDENCE (OUTPATIENT)
Dept: HEALTH INFORMATION MANAGEMENT | Facility: CLINIC | Age: 37
End: 2022-11-17

## 2022-11-21 ENCOUNTER — THERAPY VISIT (OUTPATIENT)
Dept: PHYSICAL THERAPY | Facility: CLINIC | Age: 37
End: 2022-11-21
Payer: COMMERCIAL

## 2022-11-21 DIAGNOSIS — R27.9 LACK OF COORDINATION: ICD-10-CM

## 2022-11-21 DIAGNOSIS — M62.89 PELVIC FLOOR DYSFUNCTION IN FEMALE: Primary | ICD-10-CM

## 2022-11-21 PROCEDURE — 97110 THERAPEUTIC EXERCISES: CPT | Mod: GP

## 2022-11-21 PROCEDURE — 97112 NEUROMUSCULAR REEDUCATION: CPT | Mod: GP

## 2022-11-22 ENCOUNTER — PRENATAL OFFICE VISIT (OUTPATIENT)
Dept: OBGYN | Facility: CLINIC | Age: 37
End: 2022-11-22
Payer: COMMERCIAL

## 2022-11-22 VITALS
SYSTOLIC BLOOD PRESSURE: 107 MMHG | WEIGHT: 137.9 LBS | OXYGEN SATURATION: 96 % | BODY MASS INDEX: 22.26 KG/M2 | DIASTOLIC BLOOD PRESSURE: 66 MMHG | HEART RATE: 60 BPM

## 2022-11-22 DIAGNOSIS — Z30.430 ENCOUNTER FOR IUD INSERTION: Primary | ICD-10-CM

## 2022-11-22 LAB — HCG UR QL: NEGATIVE

## 2022-11-22 PROCEDURE — 81025 URINE PREGNANCY TEST: CPT | Performed by: OBSTETRICS & GYNECOLOGY

## 2022-11-22 PROCEDURE — 58300 INSERT INTRAUTERINE DEVICE: CPT | Performed by: OBSTETRICS & GYNECOLOGY

## 2022-11-22 NOTE — PROGRESS NOTES
IUD Insertion:  CONSULT:    Is a pregnancy test required: Yes.  Was it positive or negative?  Negative  Was a consent obtained?  Yes    Subjective: Arely Elena is a 37 year old  presents for IUD and desires Mirena type IUD.    Arely Elena understands she may have the IUD removed at any time. IUD should be removed by a health care provider.    The entire insertion procedure was reviewed with the patient, including care after placement.    Patient's last menstrual period was 12/15/2021. Denies any unprotected intercourse. No allergy to betadine or shellfish.   HCG Qual Urine     hCG Urine Qualitative   Date Value Ref Range Status   2022 Negative Negative Final     Comment:     This test is for screening purposes.  Results should be interpreted along with the clinical picture.  Confirmation testing is available if warranted by ordering [id number], HCG Quantitative Pregnancy.       LMP 12/15/2021     Pelvic Exam:   Normal appearing external genitalia, normal appearing vaginal mucosa, normal appearing cervix, no vaginal discharge     PROCEDURE NOTE: -- IUD Insertion    Reason for Insertion: contraception    Premedicated with ibuprofen.  Under sterile technique, cervix was visualized with speculum and prepped with Betadine solution swab x 3. Tenaculum was placed for stability. The uterus was gently straightened and sounded to 7.0 cm. IUD prepared for placement, and IUD inserted according to 's instructions without difficulty or significant resitance, and deployed at the fundus. The strings were visualized and trimmed to 3.0 cm from the external os. Tenaculum was removed and hemostasis noted after application of silver nitrate sticks. Speculum removed.  Patient tolerated procedure well.    Lot # [id number]  Exp: 2024 OCT    EBL: minimal    Complications: none    ASSESSMENT:     ICD-10-CM    1. Encounter for IUD insertion  Z30.430 HCG Qual, Urine ([id number])     levonorgestrel (MIRENA) 20 MCG/DAY IUD      levonorgestrel (MIRENA) 20 MCG/DAY IUD 20 mcg     INSERTION INTRAUTERINE DEVICE           PLAN:    Given 's handouts, including when to have IUD removed, list of danger s/sx, side effects and follow up recommended. Encouraged condom use for prevention of STD. Back up contraception advised for 7 days if progestin method. Advised to call for any fever, for prolonged or severe pain or bleeding, abnormal vaginal discharge, or unable to palpate strings. She was advised to use pain medications (ibuprofen) as needed for mild to moderate pain. Advised to follow-up in clinic in 4-6 weeks for IUD string check if unable to find strings or as directed by provider.     Shruthi Keys MD

## 2022-11-22 NOTE — PROGRESS NOTES
"  IUD Insertion:  CONSULT:    Is a pregnancy test required: {Pregnancy test required:972389}  Was a consent obtained?  {Yes/No:026767::\"Yes\"}    Subjective: Arely Elena is a 37 year old  presents for IUD and desires {OB IUD TYPE:093595} type IUD.    Patient has been given the opportunity to ask questions about all forms of birth control, including all options appropriate for Arely Elena. Discussed that no method of birth control, except abstinence is 100% effective against pregnancy or sexually transmitted infection.     Arely Elena understands she may have the IUD removed at any time. IUD should be removed by a health care provider.    The entire insertion procedure was reviewed with the patient, including care after placement.    Patient's last menstrual period was 12/15/2021. {last sex (Optional):381246}. No allergy to betadine or shellfish. {std screenin}  HCG Qual Urine   Date Value Ref Range Status   10/31/2019 Positive (A) NEG^Negative Final     Comment:     This test is for screening purposes.  Results should be interpreted along with   the clinical picture.  Confirmation testing is available if warranted by   ordering BEN990, HCG Quantitative Pregnancy.           LMP 12/15/2021     Pelvic Exam:   EG/BUS: normal genital architecture without lesions, erythema or abnormal secretions.   Vagina: moist, pink, rugae with physiologic discharge and secretions  Cervix: ***parous no lesions and pink, moist, closed, without lesion or CMT  Uterus: ***position, mobile, no pain  Adnexa: within normal limits and no masses, nodularity, tenderness    PROCEDURE NOTE: -- IUD Insertion    Reason for Insertion: {IUD reasons:526068::\"contraception\"}    {IUD pain:958318}  Under sterile technique, cervix was visualized with speculum and prepped with {cleansing agents:784484::\"Betadine\"} solution swab x 3. {IUD instruments:877327::\"Tenaculum\"} was placed for stability. The uterus was gently straightened and " sounded to {IUD SOUNDING DEPTHS:573344} cm. IUD prepared for placement, and IUD inserted according to 's instructions without difficulty or significant resitance, and deployed at the fundus. The strings were visualized and trimmed to {IUD SOUNDING DEPTHS:339530} cm from the external os. Tenaculum was removed and hemostasis noted. Speculum removed.  Patient tolerated procedure well.    Lot # ***  Exp: ***    EBL: minimal    Complications: none    ASSESSMENT: No diagnosis found.     PLAN:    Given 's handouts, including when to have IUD removed, list of danger s/sx, side effects and follow up recommended. Encouraged condom use for prevention of STD. Back up contraception advised for 7 days if progestin method. Advised to call for any fever, for prolonged or severe pain or bleeding, abnormal vaginal discharge, or unable to palpate strings. She was advised to use pain medications (ibuprofen) as needed for mild to moderate pain. Advised to follow-up in clinic in 4-6 weeks for IUD string check if unable to find strings or as directed by provider.     Shruthi Keys MD

## 2022-12-27 ENCOUNTER — THERAPY VISIT (OUTPATIENT)
Dept: PHYSICAL THERAPY | Facility: CLINIC | Age: 37
End: 2022-12-27
Payer: COMMERCIAL

## 2022-12-27 DIAGNOSIS — M62.89 PELVIC FLOOR DYSFUNCTION IN FEMALE: Primary | ICD-10-CM

## 2022-12-27 DIAGNOSIS — R27.9 LACK OF COORDINATION: ICD-10-CM

## 2022-12-27 PROCEDURE — 97110 THERAPEUTIC EXERCISES: CPT | Mod: GP

## 2022-12-27 PROCEDURE — 97112 NEUROMUSCULAR REEDUCATION: CPT | Mod: GP

## 2023-01-05 ENCOUNTER — THERAPY VISIT (OUTPATIENT)
Dept: PHYSICAL THERAPY | Facility: CLINIC | Age: 38
End: 2023-01-05
Payer: COMMERCIAL

## 2023-01-05 DIAGNOSIS — R27.9 LACK OF COORDINATION: ICD-10-CM

## 2023-01-05 DIAGNOSIS — M62.89 PELVIC FLOOR DYSFUNCTION IN FEMALE: Primary | ICD-10-CM

## 2023-01-05 PROCEDURE — 97110 THERAPEUTIC EXERCISES: CPT | Mod: GP

## 2023-01-05 PROCEDURE — 97112 NEUROMUSCULAR REEDUCATION: CPT | Mod: GP

## 2023-01-18 ENCOUNTER — MEDICAL CORRESPONDENCE (OUTPATIENT)
Dept: HEALTH INFORMATION MANAGEMENT | Facility: CLINIC | Age: 38
End: 2023-01-18

## 2023-01-25 ENCOUNTER — OFFICE VISIT (OUTPATIENT)
Dept: OPTOMETRY | Facility: CLINIC | Age: 38
End: 2023-01-25
Payer: COMMERCIAL

## 2023-01-25 ENCOUNTER — THERAPY VISIT (OUTPATIENT)
Dept: PHYSICAL THERAPY | Facility: CLINIC | Age: 38
End: 2023-01-25
Payer: COMMERCIAL

## 2023-01-25 DIAGNOSIS — H52.13 MYOPIA OF BOTH EYES: ICD-10-CM

## 2023-01-25 DIAGNOSIS — M62.89 PELVIC FLOOR DYSFUNCTION IN FEMALE: Primary | ICD-10-CM

## 2023-01-25 DIAGNOSIS — H40.003 GLAUCOMA SUSPECT OF BOTH EYES: ICD-10-CM

## 2023-01-25 DIAGNOSIS — Z01.00 EXAMINATION OF EYES AND VISION: Primary | ICD-10-CM

## 2023-01-25 DIAGNOSIS — H10.13 ALLERGIC CONJUNCTIVITIS OF BOTH EYES: ICD-10-CM

## 2023-01-25 DIAGNOSIS — R27.9 LACK OF COORDINATION: ICD-10-CM

## 2023-01-25 DIAGNOSIS — H52.223 REGULAR ASTIGMATISM OF BOTH EYES: ICD-10-CM

## 2023-01-25 PROCEDURE — 97110 THERAPEUTIC EXERCISES: CPT | Mod: GP

## 2023-01-25 PROCEDURE — 92015 DETERMINE REFRACTIVE STATE: CPT | Performed by: OPTOMETRIST

## 2023-01-25 PROCEDURE — 92004 COMPRE OPH EXAM NEW PT 1/>: CPT | Performed by: OPTOMETRIST

## 2023-01-25 PROCEDURE — 97112 NEUROMUSCULAR REEDUCATION: CPT | Mod: GP

## 2023-01-25 RX ORDER — AZELASTINE HYDROCHLORIDE 0.5 MG/ML
1 SOLUTION/ DROPS OPHTHALMIC 2 TIMES DAILY PRN
Qty: 6 ML | Refills: 11 | Status: CANCELLED | OUTPATIENT
Start: 2023-01-25 | End: 2024-01-25

## 2023-01-25 ASSESSMENT — VISUAL ACUITY
OS_CC: 20/20
OS_CC: 20/20
OD_CC: 20/20
METHOD: SNELLEN - LINEAR
OD_CC+: -1
CORRECTION_TYPE: GLASSES
OD_CC: 20/20

## 2023-01-25 ASSESSMENT — CONF VISUAL FIELD
OS_INFERIOR_NASAL_RESTRICTION: 0
OS_INFERIOR_TEMPORAL_RESTRICTION: 0
OS_NORMAL: 1
METHOD: COUNTING FINGERS
OD_INFERIOR_TEMPORAL_RESTRICTION: 0
OD_SUPERIOR_NASAL_RESTRICTION: 0
OS_SUPERIOR_NASAL_RESTRICTION: 0
OD_NORMAL: 1
OD_INFERIOR_NASAL_RESTRICTION: 0
OD_SUPERIOR_TEMPORAL_RESTRICTION: 0
OS_SUPERIOR_TEMPORAL_RESTRICTION: 0

## 2023-01-25 ASSESSMENT — REFRACTION_MANIFEST
OD_CYLINDER: +1.50
OS_SPHERE: -1.25
OD_AXIS: 007
OD_SPHERE: -2.50
OS_CYLINDER: +0.25
OS_AXIS: 171

## 2023-01-25 ASSESSMENT — TONOMETRY
IOP_METHOD: APPLANATION
OS_IOP_MMHG: 17
OD_IOP_MMHG: 17

## 2023-01-25 ASSESSMENT — EXTERNAL EXAM - RIGHT EYE: OD_EXAM: NORMAL

## 2023-01-25 ASSESSMENT — REFRACTION_WEARINGRX
OD_AXIS: 007
OS_AXIS: 171
OD_SPHERE: -2.50
OD_CYLINDER: +1.50
OS_SPHERE: -1.25
SPECS_TYPE: SVL
OS_CYLINDER: +0.50

## 2023-01-25 ASSESSMENT — KERATOMETRY
OS_AXISANGLE2_DEGREES: 21
OD_AXISANGLE2_DEGREES: 118
OD_K2POWER_DIOPTERS: 43.50
OD_K1POWER_DIOPTERS: 43.00
OS_K2POWER_DIOPTERS: 43.50
OS_K1POWER_DIOPTERS: 43.25

## 2023-01-25 ASSESSMENT — SLIT LAMP EXAM - LIDS
COMMENTS: NORMAL
COMMENTS: NORMAL

## 2023-01-25 ASSESSMENT — CUP TO DISC RATIO
OD_RATIO: 0.5
OS_RATIO: 0.25

## 2023-01-25 ASSESSMENT — EXTERNAL EXAM - LEFT EYE: OS_EXAM: NORMAL

## 2023-01-25 NOTE — PROGRESS NOTES
Chief Complaint   Patient presents with     COMPREHENSIVE EYE EXAM      Has infant with her today    Last Eye Exam: 11/25/2019  Dilated Previously: No, Prefers no DFE.    What are you currently using to see?  glasses       Distance Vision Acuity: Noticed gradual change in both eyes, slight, also noticing the glare at night is getting worse     Near Vision Acuity: Satisfied with vision while reading and using computer with glasses, thinks that there is a very slight, if any change.     Eye Comfort: good, had a phase in past with itchy and red eyes  Do you use eye drops? 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       OBJECTIVE: See Ophthalmology exam    ASSESSMENT:    ICD-10-CM    1. Examination of eyes and vision  Z01.00 EYE EXAM (SIMPLE-NONBILLABLE)      2. Myopia of both eyes  H52.13 REFRACTION      3. Regular astigmatism of both eyes  H52.223 REFRACTION      4. Allergic conjunctivitis of both eyes  H10.13 EYE EXAM (SIMPLE-NONBILLABLE)      5. Glaucoma suspect of both eyes  H40.003 EYE EXAM (SIMPLE-NONBILLABLE)    Asymmetric C/Ds, RE > LE  IOPs within normal limits both eyes          PLAN:     Patient Instructions   Eyeglass prescription given.  No change in eyeglass prescription.    OTC Pataday or Lastacaft to be used once daily for itchy eyes.  Use as needed.    You are a glaucoma suspect.  Referral for glaucoma testing. OCT/Visual Field.    Recommend returning for dilated fundus exam. It is a more thorough exam of the retina.    Return to see me as needed.    Galdino Prado, OD

## 2023-01-25 NOTE — PATIENT INSTRUCTIONS
Eyeglass prescription given.  No change in eyeglass prescription.    OTC Pataday or Lastacaft to be used once daily for itchy eyes.  Use as needed.    You are a glaucoma suspect.  Referral for glaucoma testing. OCT/Visual Field.    Recommend returning for dilated fundus exam. It is a more thorough exam of the retina.    Return to see me as needed.    Galdino Prado OD      Optometry Providers       Clinic Locations                                 Telephone Number   Dr. Kimberlee Ny   [place]  [place] [phone]     [facility] Hours:                [place] [facility] [place] [facility] Hours:       [facility] Hours:   [address]   [address]      [address]  [place] [address]   [place] [address]    [place] [address]  Phone: [phone]                    Phone: [phone]     Phone: [phone]                      Monday 8:00-6:00                          Monday 8:00-6:00                          Monday 8:00-6:00              Tuesday 8:00-6:00                          Tuesday 8:00-6:00                          Tuesday 8:00-6:00              Wednesday 8:00-6:00                  Wednesday 8:00-6:00                   Wednesday 8:00-6:00      Thursday 8:00-6:00                        Thursday 8:00-6:00                         Thursday 8:00-6:00            Friday 8:00-5:00                              Friday 8:00-5:00                              Friday 8:00-5:00    [facility] Hours:   [address] [place] [address]  [phone]    Monday 9:00-6:00  Tuesday 9:00-6:00  Wednesday 9:00-6:00  Thursday 9:00-6:00  Friday 9:00-5:00  Please log on to [email or web address] to order your contact lenses.  The link is found on the [facility] page.  As always, Thank you for trusting us with your health care needs!

## 2023-01-25 NOTE — LETTER
1/25/2023         RE: Arely Elena  [address]  [facility] [address]        Dear Colleague,    Thank you for referring your patient, Arely Elena, to the [facility]. Please see a copy of my visit note below.    Chief Complaint   Patient presents with     COMPREHENSIVE EYE EXAM      Has infant with her today    Last Eye Exam: 11/25/2019  Dilated Previously: No, Prefers no DFE.    What are you currently using to see?  glasses       Distance Vision Acuity: Noticed gradual change in both eyes, slight, also noticing the glare at night is getting worse     Near Vision Acuity: Satisfied with vision while reading and using computer with glasses, thinks that there is a very slight, if any change.     Eye Comfort: good, had a phase in past with itchy and red eyes  Do you use eye drops? 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       OBJECTIVE: See Ophthalmology exam    ASSESSMENT:    ICD-10-CM    1. Examination of eyes and vision  Z01.00 EYE EXAM (SIMPLE-NONBILLABLE)      2. Myopia of both eyes  H52.13 REFRACTION      3. Regular astigmatism of both eyes  H52.223 REFRACTION      4. Allergic conjunctivitis of both eyes  H10.13 EYE EXAM (SIMPLE-NONBILLABLE)      5. Glaucoma suspect of both eyes  H40.003 EYE EXAM (SIMPLE-NONBILLABLE)    Asymmetric C/Ds, RE > LE  IOPs within normal limits both eyes          PLAN:     Patient Instructions   Eyeglass prescription given.  No change in eyeglass prescription.    OTC Pataday or Lastacaft to be used once daily for itchy eyes.  Use as needed.    You are a glaucoma suspect.  Referral for glaucoma testing. OCT/Visual Field.    Recommend returning for dilated fundus exam. It is a more thorough exam of the retina.    Return to see me as needed.    Galdino  ISACC Prado           Again, thank you for allowing me to participate in the care of your patient.        Sincerely,        Galdino Prado OD

## 2023-02-13 ENCOUNTER — THERAPY VISIT (OUTPATIENT)
Dept: PHYSICAL THERAPY | Facility: CLINIC | Age: 38
End: 2023-02-13
Payer: COMMERCIAL

## 2023-02-13 DIAGNOSIS — R27.9 LACK OF COORDINATION: ICD-10-CM

## 2023-02-13 DIAGNOSIS — M62.89 PELVIC FLOOR DYSFUNCTION IN FEMALE: Primary | ICD-10-CM

## 2023-02-13 PROCEDURE — 97112 NEUROMUSCULAR REEDUCATION: CPT | Mod: GP

## 2023-02-13 PROCEDURE — 97110 THERAPEUTIC EXERCISES: CPT | Mod: GP

## 2023-03-01 ENCOUNTER — THERAPY VISIT (OUTPATIENT)
Dept: PHYSICAL THERAPY | Facility: CLINIC | Age: 38
End: 2023-03-01
Payer: COMMERCIAL

## 2023-03-01 DIAGNOSIS — M62.89 PELVIC FLOOR DYSFUNCTION IN FEMALE: Primary | ICD-10-CM

## 2023-03-01 DIAGNOSIS — R27.9 LACK OF COORDINATION: ICD-10-CM

## 2023-03-01 PROCEDURE — 97110 THERAPEUTIC EXERCISES: CPT | Mod: GP

## 2023-03-13 ENCOUNTER — THERAPY VISIT (OUTPATIENT)
Dept: PHYSICAL THERAPY | Facility: CLINIC | Age: 38
End: 2023-03-13
Payer: COMMERCIAL

## 2023-03-13 DIAGNOSIS — R27.9 LACK OF COORDINATION: ICD-10-CM

## 2023-03-13 DIAGNOSIS — M62.89 PELVIC FLOOR DYSFUNCTION IN FEMALE: Primary | ICD-10-CM

## 2023-03-13 PROCEDURE — 97110 THERAPEUTIC EXERCISES: CPT | Mod: GP

## 2023-03-13 PROCEDURE — 97112 NEUROMUSCULAR REEDUCATION: CPT | Mod: GP

## 2023-03-13 NOTE — PROGRESS NOTES
PROGRESS  REPORT    Progress reporting period is from 11/14/22 to 3/13/23.       SUBJECTIVE  Subjective changes noted by patient: Patient reports things are going alright. Feels a little bit better. Has been more aware of when she is tired and is working on engaging muscles.    Changes in function:  Yes (See Goal flowsheet attached for changes in current functional level)  Adverse reaction to treatment or activity: None    OBJECTIVE  Changes noted in objective findings:  Yes,  Objective: PF strength 3+/5, improved strength with TrA activation, Endurance 5 seconds,  TrP at L levator ani, good PF lengthening with DBE.     ASSESSMENT/PLAN  Updated problem list and treatment plan: Diagnosis 1:  Cystocele, midline   Decreased ROM/flexibility - manual therapy and therapeutic exercise  Decreased strength - therapeutic exercise, therapeutic activities and home program  Decreased proprioception - neuro re-education, therapeutic activities and home program  Decreased function - therapeutic activities and home program  STG/LTGs have been met or progress has been made towards goals:  Yes (See Goal flow sheet completed today.)  Assessment of Progress: The patient's condition has potential to improve.  Self Management Plans:  Patient has been instructed in a home treatment program.  Patient  has been instructed in self management of symptoms.  I have re-evaluated this patient and find that the nature, scope, duration and intensity of the therapy is appropriate for the medical condition of the patient.  Arely continues to require the following intervention to meet STG and LTG's:  PT    Recommendations:  This patient would benefit from continued therapy.     Frequency:  1 X week, once daily  Duration:  for 6 weeks        Please refer to the daily flowsheet for treatment today, total treatment time and time spent performing 1:1 timed codes.

## 2023-03-22 ENCOUNTER — THERAPY VISIT (OUTPATIENT)
Dept: PHYSICAL THERAPY | Facility: CLINIC | Age: 38
End: 2023-03-22
Payer: COMMERCIAL

## 2023-03-22 DIAGNOSIS — M62.89 PELVIC FLOOR DYSFUNCTION IN FEMALE: Primary | ICD-10-CM

## 2023-03-22 DIAGNOSIS — R27.9 LACK OF COORDINATION: ICD-10-CM

## 2023-03-22 PROCEDURE — 97112 NEUROMUSCULAR REEDUCATION: CPT | Mod: GP

## 2023-03-22 PROCEDURE — 97110 THERAPEUTIC EXERCISES: CPT | Mod: GP

## 2023-03-29 ENCOUNTER — MEDICAL CORRESPONDENCE (OUTPATIENT)
Dept: HEALTH INFORMATION MANAGEMENT | Facility: CLINIC | Age: 38
End: 2023-03-29

## 2023-04-12 ENCOUNTER — THERAPY VISIT (OUTPATIENT)
Dept: PHYSICAL THERAPY | Facility: CLINIC | Age: 38
End: 2023-04-12
Payer: COMMERCIAL

## 2023-04-12 DIAGNOSIS — M62.89 PELVIC FLOOR DYSFUNCTION IN FEMALE: Primary | ICD-10-CM

## 2023-04-12 DIAGNOSIS — R27.9 LACK OF COORDINATION: ICD-10-CM

## 2023-04-12 PROCEDURE — 97110 THERAPEUTIC EXERCISES: CPT | Mod: GP

## 2023-04-26 ENCOUNTER — THERAPY VISIT (OUTPATIENT)
Dept: PHYSICAL THERAPY | Facility: CLINIC | Age: 38
End: 2023-04-26
Payer: COMMERCIAL

## 2023-04-26 DIAGNOSIS — R27.9 LACK OF COORDINATION: ICD-10-CM

## 2023-04-26 DIAGNOSIS — M62.89 PELVIC FLOOR DYSFUNCTION IN FEMALE: Primary | ICD-10-CM

## 2023-04-26 PROCEDURE — 97530 THERAPEUTIC ACTIVITIES: CPT | Mod: GP

## 2023-04-26 PROCEDURE — 97110 THERAPEUTIC EXERCISES: CPT | Mod: GP

## 2023-05-06 ENCOUNTER — HEALTH MAINTENANCE LETTER (OUTPATIENT)
Age: 38
End: 2023-05-06

## 2023-06-09 ENCOUNTER — THERAPY VISIT (OUTPATIENT)
Dept: PHYSICAL THERAPY | Facility: CLINIC | Age: 38
End: 2023-06-09
Payer: COMMERCIAL

## 2023-06-09 DIAGNOSIS — R27.9 LACK OF COORDINATION: ICD-10-CM

## 2023-06-09 DIAGNOSIS — M62.89 PELVIC FLOOR DYSFUNCTION IN FEMALE: Primary | ICD-10-CM

## 2023-06-09 PROCEDURE — 97110 THERAPEUTIC EXERCISES: CPT | Mod: GP

## 2023-06-09 PROCEDURE — 97530 THERAPEUTIC ACTIVITIES: CPT | Mod: GP

## 2023-06-09 NOTE — PROGRESS NOTES
06/09/23 0500   Appointment Info   Signing clinician's name / credentials Ryann Will, PT, DPT   Total/Authorized Visits 14   Visits Used 12   Medical Diagnosis cystocele, midline   PT Tx Diagnosis cystocele, midline   Progress Note/Certification   Onset of illness/injury or Date of Surgery 10/28/22   Therapy Frequency every other week   Predicted Duration 1 month   Progress Note Completed Date 06/09/23   PT Goal 1   Goal Identifier LTG 1   Goal Description patient will squat to floor without reporting feeling bulge in pelvic floor, indicatnig improved PF strength.   Rationale to maximize safety and independence with performance of ADLs and functional tasks   Target Date 07/09/23   Subjective Report   Subjective Report Pt reports she notices the bulge mostly only when she is very busy at work or not paying attention to how she is moving. Continues to notice bulge with running, deep squatting  or lifting something heavy   Objective Measures   Objective Measures Objective Measure 1   Objective Measure 1   Objective Measure Worked on upright PF strength  today - tolerated well sight bulge with sit to stands right before sitting back down. reported feeling heel drop coordination with PF difficult   Treatment Interventions (PT)   Interventions Therapeutic Procedure/Exercise;Therapeutic Activity   Therapeutic Procedure/Exercise   Therapeutic Procedures: strength, endurance, ROM, flexibillity minutes (21193) 24   Ther Proc 1 bridging   Ther Proc 1 - Details x 20 progressed to bridge hold marching x 10   Therapeutic Procedures Ther Proc 3;Ther Proc 2;Ther Proc 4;Ther Proc 5;Ther Proc 6   Skilled Intervention cued for heel contact for hip strength   Ther Proc 2 cat/cow   Ther Proc 2 - Details cued to lift with PF and core x 20   Ther Proc 3 bear crawl hold   Ther Proc 3 - Details 10 x 10 sec tc at core to improve activation   Ther Proc 4 sit to stand with PF   Ther Proc 4 - Details adductor ball ; cued for elevator  lift with PF first x 10 initial bulge right before sitting down - eliminated with increasing plinth height   Ther Proc 5 Lunges   Ther Proc 5 - Details step back; x 10   Ther Proc 6 education   Ther Proc 6 - Details elevators standing for at work suggestions, focusing on upright exercises   Therapeutic Activity   Therapeutic Activities: dynamic activities to improve functional performance minutes (47521) 8   Therapeutic Activities Ther Act 2   Ther Act 1 Heel Drop   Ther Act 1 - Details vc to activate TrA and PF togther x 20   Skilled Intervention vc for core and PF activation with functional activities   Patient Response/Progress tolerated well   Ther Act 2 Sidestepping   Ther Act 2 - Details cued for smaller steps, frequent rest breaks to reset core   Plan   Home program reviewed today   Updates to plan of care every other week for 1 month   Plan for next session continue working on functional strength in upright position   Total Session Time   Timed Code Treatment Minutes 32   Total Treatment Time (sum of timed and untimed services) 32           PLAN  Continue therapy per current plan of care.    Beginning/End Dates of Progress Note Reporting Period:  06/09/23 to 06/09/2023    Referring Provider:  Corrine Kulkarni

## 2023-07-12 ENCOUNTER — THERAPY VISIT (OUTPATIENT)
Dept: PHYSICAL THERAPY | Facility: CLINIC | Age: 38
End: 2023-07-12
Payer: COMMERCIAL

## 2023-07-12 DIAGNOSIS — M62.89 PELVIC FLOOR DYSFUNCTION IN FEMALE: Primary | ICD-10-CM

## 2023-07-12 DIAGNOSIS — R27.9 LACK OF COORDINATION: ICD-10-CM

## 2023-07-12 PROCEDURE — 97110 THERAPEUTIC EXERCISES: CPT | Mod: GP

## 2023-07-12 PROCEDURE — 97530 THERAPEUTIC ACTIVITIES: CPT | Mod: GP

## 2023-08-02 ENCOUNTER — THERAPY VISIT (OUTPATIENT)
Dept: PHYSICAL THERAPY | Facility: CLINIC | Age: 38
End: 2023-08-02
Payer: COMMERCIAL

## 2023-08-02 DIAGNOSIS — R27.9 LACK OF COORDINATION: ICD-10-CM

## 2023-08-02 DIAGNOSIS — M62.89 PELVIC FLOOR DYSFUNCTION IN FEMALE: Primary | ICD-10-CM

## 2023-08-02 PROCEDURE — 97530 THERAPEUTIC ACTIVITIES: CPT | Mod: GP

## 2023-08-02 PROCEDURE — 97110 THERAPEUTIC EXERCISES: CPT | Mod: 59

## 2023-08-02 NOTE — PROGRESS NOTES
08/02/23 0500   Appointment Info   Signing clinician's name / credentials Ryann Will, PT, DPT   Total/Authorized Visits 14   Visits Used 14   Medical Diagnosis cystocele, midline   PT Tx Diagnosis cystocele, midline   Quick Adds Pelvic Consent   Progress Note/Certification   Onset of illness/injury or Date of Surgery 10/28/22   Therapy Frequency every other week   Predicted Duration 1 month   Progress Note Completed Date 06/09/23   PT Goal 1   Goal Identifier LTG 1   Goal Description patient will squat to floor without reporting feeling bulge in pelvic floor, indicatnig improved PF strength.   Rationale to maximize safety and independence with performance of ADLs and functional tasks   Goal Progress no bulge with squatting today   Target Date 08/09/23   Subjective Report   Subjective Report Patient reports she has been on call a lot this past week. Has been more tired and notices PF bulging when she is more tired.   Objective Measures   Objective Measures Objective Measure 1   Objective Measure 1   Objective Measure no bulge during with squatting, sidestepping, slight with heel drops and lunges on RLE. improved with cues for core activation and resetting   Treatment Interventions (PT)   Interventions Therapeutic Procedure/Exercise;Therapeutic Activity   Therapeutic Procedure/Exercise   Therapeutic Procedures: strength, endurance, ROM, flexibillity minutes (20199) 25   Therapeutic Procedures Ther Proc 2;Ther Proc 3;Ther Proc 4;Ther Proc 5;Ther Proc 6   Ther Proc 1 bridging   Ther Proc 1 - Details x 20 progressed to SL bridge x 15   Ther Proc 2 cat/cow   Ther Proc 2 - Details cued to lift with PF and core x 10   Ther Proc 3 bear crawl hold   Ther Proc 3 - Details 8 x 10 sec tc at core to improve activation   Ther Proc 4 sit to stand with PF   Ther Proc 4 - Details cued for elevator lift with PF first x 10 slight bulge right before stanind upright then goes away   Ther Proc 5 Lunges   Ther Proc 5 - Details step  back; x 10;  cued for TrA and PF activation and anterior trun lean which improved ability to perform without bulge sensation   Ther Proc 6 gluteal myofascial full arc, upper arc, lower arc   Ther Proc 6 - Details x 20 each - fatigue but good mechanics here   Skilled Intervention single leg strengthening, core stability for pelvic floor control; min cues for form throughout   Patient Response/Progress tolerated well   Therapeutic Activity   Therapeutic Activities: dynamic activities to improve functional performance minutes (50625) 8   Therapeutic Activities Ther Act 2;Ther Act 3   Ther Act 1 Heel Drop   Ther Act 1 - Details vc to activate TrA and PF togther x 20; Cued to engage PF prior to dropping heels improved ability to hold without feeling bulge   Ther Act 2 Side stepping   Ther Act 2 - Details cued for smaller steps,reset after every 5-6 steps able to control here   Skilled Intervention improved PF stability during heel drops when cued to hold PF with heels lifted   Ther Act 3 Squat to floor   Ther Act 3 - Details with PF x 5 - no bulge with this   Plan   Home program reviewed for discharge   Updates to plan of care overall feeling more tired and difficulty with PF activation today due to long overnight shifts at work   Plan for next session discharge today   Comments   Pelvic Health Informed Consent Statement Discussed with patient/guardian reason for referral regarding pelvic health needs and external/internal pelvic floor muscle examination.  Opportunity provided to ask questions and verbal consent for assessment and intervention was given.   Total Session Time   Timed Code Treatment Minutes 33   Total Treatment Time (sum of timed and untimed services) 33           DISCHARGE  Reason for Discharge: Patient has met all goals.    Equipment Issued: none    Discharge Plan: Patient to continue home program.    Referring Provider:  No ref. provider found

## 2023-10-06 ENCOUNTER — TELEPHONE (OUTPATIENT)
Dept: DERMATOLOGY | Facility: CLINIC | Age: 38
End: 2023-10-06
Payer: COMMERCIAL

## 2023-10-06 NOTE — TELEPHONE ENCOUNTER
Got pt schedule for the following:        Appointment type: New  Provider: Emily Echavarria  Return date: 4/24/24  Specialty phone number: [phone]

## 2023-11-13 ENCOUNTER — OFFICE VISIT (OUTPATIENT)
Dept: FAMILY MEDICINE | Facility: CLINIC | Age: 38
End: 2023-11-13
Payer: COMMERCIAL

## 2023-11-13 VITALS
BODY MASS INDEX: 19.72 KG/M2 | WEIGHT: 122.7 LBS | RESPIRATION RATE: 16 BRPM | HEART RATE: 60 BPM | TEMPERATURE: 98.1 F | OXYGEN SATURATION: 99 % | HEIGHT: 66 IN | SYSTOLIC BLOOD PRESSURE: 115 MMHG | DIASTOLIC BLOOD PRESSURE: 75 MMHG

## 2023-11-13 DIAGNOSIS — I83.891 SYMPTOMATIC VARICOSE VEINS OF RIGHT LOWER EXTREMITY: ICD-10-CM

## 2023-11-13 DIAGNOSIS — J01.00 ACUTE NON-RECURRENT MAXILLARY SINUSITIS: ICD-10-CM

## 2023-11-13 DIAGNOSIS — Z00.00 ROUTINE GENERAL MEDICAL EXAMINATION AT A HEALTH CARE FACILITY: Primary | ICD-10-CM

## 2023-11-13 PROBLEM — Z34.81 ENCOUNTER FOR SUPERVISION OF OTHER NORMAL PREGNANCY, FIRST TRIMESTER: Status: RESOLVED | Noted: 2022-02-23 | Resolved: 2023-11-13

## 2023-11-13 PROBLEM — Z34.90 PREGNANCY: Status: RESOLVED | Noted: 2022-09-15 | Resolved: 2023-11-13

## 2023-11-13 PROBLEM — O26.899 RH NEGATIVE STATE IN ANTEPARTUM PERIOD: Status: RESOLVED | Noted: 2019-11-26 | Resolved: 2023-11-13

## 2023-11-13 PROBLEM — Z87.59 HISTORY OF SHOULDER DYSTOCIA IN PRIOR PREGNANCY: Status: RESOLVED | Noted: 2019-11-26 | Resolved: 2023-11-13

## 2023-11-13 PROBLEM — Z67.91 RH NEGATIVE STATE IN ANTEPARTUM PERIOD: Status: RESOLVED | Noted: 2019-11-26 | Resolved: 2023-11-13

## 2023-11-13 LAB
ERYTHROCYTE [DISTWIDTH] IN BLOOD BY AUTOMATED COUNT: 12.4 % (ref 10–15)
HCT VFR BLD AUTO: 45.1 % (ref 35–47)
HGB BLD-MCNC: 14.3 G/DL (ref 11.7–15.7)
MCH RBC QN AUTO: 28.8 PG (ref 26.5–33)
MCHC RBC AUTO-ENTMCNC: 31.7 G/DL (ref 31.5–36.5)
MCV RBC AUTO: 91 FL (ref 78–100)
PLATELET # BLD AUTO: 274 10E3/UL (ref 150–450)
RBC # BLD AUTO: 4.96 10E6/UL (ref 3.8–5.2)
WBC # BLD AUTO: 9.1 10E3/UL (ref 4–11)

## 2023-11-13 PROCEDURE — 99385 PREV VISIT NEW AGE 18-39: CPT | Performed by: FAMILY MEDICINE

## 2023-11-13 PROCEDURE — 82728 ASSAY OF FERRITIN: CPT | Performed by: FAMILY MEDICINE

## 2023-11-13 PROCEDURE — 80053 COMPREHEN METABOLIC PANEL: CPT | Performed by: FAMILY MEDICINE

## 2023-11-13 PROCEDURE — 36415 COLL VENOUS BLD VENIPUNCTURE: CPT | Performed by: FAMILY MEDICINE

## 2023-11-13 PROCEDURE — 85027 COMPLETE CBC AUTOMATED: CPT | Performed by: FAMILY MEDICINE

## 2023-11-13 PROCEDURE — 99213 OFFICE O/P EST LOW 20 MIN: CPT | Mod: 25 | Performed by: FAMILY MEDICINE

## 2023-11-13 RX ORDER — AZITHROMYCIN 250 MG/1
TABLET, FILM COATED ORAL
Qty: 6 TABLET | Refills: 0 | Status: SHIPPED | OUTPATIENT
Start: 2023-11-13 | End: 2023-11-18

## 2023-11-13 ASSESSMENT — ENCOUNTER SYMPTOMS
HEMATOCHEZIA: 0
EYE PAIN: 0
PARESTHESIAS: 0
FEVER: 0
NAUSEA: 0
DIARRHEA: 0
HEARTBURN: 0
CONSTIPATION: 0
MYALGIAS: 0
JOINT SWELLING: 0
ABDOMINAL PAIN: 0
ARTHRALGIAS: 0
FREQUENCY: 0
WEAKNESS: 0
SHORTNESS OF BREATH: 0
NERVOUS/ANXIOUS: 0
HEMATURIA: 0
PALPITATIONS: 0
HEADACHES: 1
SORE THROAT: 1
DIZZINESS: 0
CHILLS: 0
COUGH: 1
DYSURIA: 0
BREAST MASS: 0

## 2023-11-13 ASSESSMENT — ANXIETY QUESTIONNAIRES
2. NOT BEING ABLE TO STOP OR CONTROL WORRYING: NOT AT ALL
GAD7 TOTAL SCORE: 3
6. BECOMING EASILY ANNOYED OR IRRITABLE: NOT AT ALL
1. FEELING NERVOUS, ANXIOUS, OR ON EDGE: SEVERAL DAYS
7. FEELING AFRAID AS IF SOMETHING AWFUL MIGHT HAPPEN: SEVERAL DAYS
IF YOU CHECKED OFF ANY PROBLEMS ON THIS QUESTIONNAIRE, HOW DIFFICULT HAVE THESE PROBLEMS MADE IT FOR YOU TO DO YOUR WORK, TAKE CARE OF THINGS AT HOME, OR GET ALONG WITH OTHER PEOPLE: NOT DIFFICULT AT ALL
GAD7 TOTAL SCORE: 3
3. WORRYING TOO MUCH ABOUT DIFFERENT THINGS: NOT AT ALL
4. TROUBLE RELAXING: SEVERAL DAYS
5. BEING SO RESTLESS THAT IT IS HARD TO SIT STILL: NOT AT ALL

## 2023-11-13 ASSESSMENT — PATIENT HEALTH QUESTIONNAIRE - PHQ9
10. IF YOU CHECKED OFF ANY PROBLEMS, HOW DIFFICULT HAVE THESE PROBLEMS MADE IT FOR YOU TO DO YOUR WORK, TAKE CARE OF THINGS AT HOME, OR GET ALONG WITH OTHER PEOPLE: SOMEWHAT DIFFICULT
SUM OF ALL RESPONSES TO PHQ QUESTIONS 1-9: 5
SUM OF ALL RESPONSES TO PHQ QUESTIONS 1-9: 5

## 2023-11-13 ASSESSMENT — ASTHMA QUESTIONNAIRES: ACT_TOTALSCORE: 17

## 2023-11-13 ASSESSMENT — PAIN SCALES - GENERAL: PAINLEVEL: MILD PAIN (2)

## 2023-11-13 NOTE — PROGRESS NOTES
SUBJECTIVE:   CC: Arely is an 38 year old who presents for preventive health visit.       11/13/2023    10:59 AM   Additional Questions   Roomed by NALLELY Beal       Healthy Habits:     Getting at least 3 servings of Calcium per day:  Yes    Bi-annual eye exam:  Yes    Dental care twice a year:  NO    Sleep apnea or symptoms of sleep apnea:  None    Diet:  Regular (no restrictions)    Frequency of exercise:  2-3 days/week    Duration of exercise:  15-30 minutes    Taking medications regularly:  Yes    Medication side effects:  Not applicable    Additional concerns today:  Yes    URI symptoms for the past two weeks.  Coughing intermittently. Mostly sinus pressure.   Feels hot at night but denies noticing any fevers.   Patient has throat pain. Son was diagnosed with strep a few hours ago.       Today's PHQ-9 Score:       11/13/2023    10:49 AM   PHQ-9 SCORE   PHQ-9 Total Score MyChart 5 (Mild depression)   PHQ-9 Total Score 5     Have you ever done Advance Care Planning? (For example, a Health Directive, POLST, or a discussion with a medical provider or your loved ones about your wishes): not discussed today    Social History     Tobacco Use    Smoking status: Never    Smokeless tobacco: Never   Substance Use Topics    Alcohol use: Not Currently         11/13/2023    10:52 AM   Alcohol Use   Prescreen: >3 drinks/day or >7 drinks/week? No     Reviewed orders with patient.  Reviewed health maintenance and updated orders accordingly - yes    Breast Cancer Screening:    FHS-7:       11/13/2023    10:53 AM   Breast CA Risk Assessment (FHS-7)   Did any of your first-degree relatives have breast or ovarian cancer? Yes   Did any of your relatives have bilateral breast cancer? No   Did any man in your family have breast cancer? No   Did any woman in your family have breast and ovarian cancer? No   Did any woman in your family have breast cancer before age 50 y? No   Do you have 2 or more relatives with breast and/or ovarian  "cancer? Yes   Do you have 2 or more relatives with breast and/or bowel cancer? No   Mother had ovarian cancer. Passed away at 59. Grandmother had breast cancer (alive).  History of abnormal Pap smear: NO - age 30-65 PAP every 5 years with negative HPV co-testing recommended      Latest Ref Rng & Units 9/24/2019     9:07 AM 9/24/2019     8:00 AM   PAP / HPV   PAP (Historical)  NIL     HPV 16 DNA NEG^Negative  Negative    HPV 18 DNA NEG^Negative  Negative    Other HR HPV NEG^Negative  Negative      Reviewed and updated as needed this visit by clinical staff   Tobacco  Allergies  Meds     [facility] Hx          Reviewed and updated as needed this visit by Provider         [facility] Hx             Review of Systems   Constitutional:  Negative for chills and fever.   HENT:  Positive for congestion and sore throat. Negative for ear pain and hearing loss.    Eyes:  Negative for pain and visual disturbance.   Respiratory:  Positive for cough. Negative for shortness of breath.    Cardiovascular:  Negative for chest pain, palpitations and peripheral edema.   Gastrointestinal:  Negative for abdominal pain, constipation, diarrhea, heartburn, hematochezia and nausea.   Breasts:  Negative for tenderness, breast mass and discharge.   Genitourinary:  Negative for dysuria, frequency, genital sores, hematuria, pelvic pain, urgency, vaginal bleeding and vaginal discharge.   Musculoskeletal:  Negative for arthralgias, joint swelling and myalgias.   Skin:  Negative for rash.   Neurological:  Positive for headaches. Negative for dizziness, weakness and paresthesias.   Psychiatric/Behavioral:  Negative for mood changes. The patient is not nervous/anxious.       OBJECTIVE:   /75   Pulse 60   Temp 98.1  F (36.7  C) (Oral)   Resp 16   Ht 1.683 m (5' 6.25\")   Wt 55.7 kg (122 lb 11.2 oz)   LMP  (LMP Unknown)   SpO2 99%   Breastfeeding Yes   BMI 19.66 kg/m    Physical Exam  GENERAL: healthy, alert and no distress  EYES: Eyes grossly " normal to inspection, PERRL and conjunctivae and sclerae normal  HENT: ear canals and TM's normal, mild pharyngeal erythema.   NECK: no adenopathy, no asymmetry, masses, or scars and thyroid normal to palpation  RESP: lungs clear to auscultation - no rales, rhonchi or wheezes  BREAST: normal without masses, tenderness or nipple discharge and no palpable axillary masses or adenopathy  CV: regular rate and rhythm, normal S1 S2, no S3 or S4, no murmur, click or rub, no peripheral edema and peripheral pulses strong  ABDOMEN: soft, nontender, no hepatosplenomegaly, no masses and bowel sounds normal  MS: no gross musculoskeletal defects noted, no edema. Bulging superficial veins in right lower extremity.   SKIN: no suspicious lesions or rashes  NEURO: Normal strength and tone, mentation intact and speech normal  PSYCH: mentation appears normal, affect normal/bright    Diagnostic Test Results:  Labs reviewed in Epic    ASSESSMENT/PLAN:   Arely was seen today for physical.    Diagnoses and all orders for this visit:    Routine general medical examination at a health care facility  -     PRIMARY CARE FOLLOW-UP SCHEDULING; Future  -     REVIEW OF HEALTH MAINTENANCE PROTOCOL ORDERS  -     Ferritin; Future  -     Comprehensive metabolic panel (BMP + Alb, Alk Phos, ALT, AST, Total. Bili, TP); Future  -     CBC with platelets; Future    Symptomatic varicose veins of right lower extremity  -     Vascular Surgery Referral; Future    Acute non-recurrent maxillary sinusitis  -     azithromycin (ZITHROMAX) 250 MG tablet; Take 2 tablets (500 mg) by mouth daily for 1 day, THEN 1 tablet (250 mg) daily for 4 days.        Return in about 1 year (around 11/13/2024) for Annual Physical Exam.           Patient has been advised of split billing requirements and indicates understanding: Yes      COUNSELING:  Reviewed preventive health counseling, as reflected in patient instructions        She reports that she has never smoked. She has never  used smokeless tobacco.      Caitlin Medina MD  [facility]  Answers submitted by the patient for this visit:  Patient Health Questionnaire (Submitted on 11/13/2023)  If you checked off any problems, how difficult have these problems made it for you to do your work, take care of things at home, or get along with other people?: Somewhat difficult  PHQ9 TOTAL SCORE: 5  CHEIKH-7 (Submitted on 11/13/2023)  CHEIKH 7 TOTAL SCORE: 3

## 2023-11-13 NOTE — PATIENT INSTRUCTIONS
[facility]  [address], [place] [address]   ~6.3 mi  [phone]      Preventive Health Recommendations  Female Ages 26 - 39  Yearly exam:   See your health care provider every year in order to  Review health changes.   Discuss preventive care.    Review your medicines if you your doctor has prescribed any.    Until age 30: Get a Pap test every three years (more often if you have had an abnormal result).    After age 30: Talk to your doctor about whether you should have a Pap test every 3 years or have a Pap test with HPV screening every 5 years.   You do not need a Pap test if your uterus was removed (hysterectomy) and you have not had cancer.  You should be tested each year for STDs (sexually transmitted diseases), if you're at risk.   Talk to your provider about how often to have your cholesterol checked.  If you are at risk for diabetes, you should have a diabetes test (fasting glucose).  Shots: Get a flu shot each year. Get a tetanus shot every 10 years.   Nutrition:   Eat at least 5 servings of fruits and vegetables each day.  Eat whole-grain bread, whole-wheat pasta and brown rice instead of white grains and rice.  Get adequate Calcium and Vitamin D.     Lifestyle  Exercise at least 150 minutes a week (30 minutes a day, 5 days of the week). This will help you control your weight and prevent disease.  Limit alcohol to one drink per day.  No smoking.   Wear sunscreen to prevent skin cancer.  See your dentist every six months for an exam and cleaning.

## 2023-11-14 LAB
ALBUMIN SERPL BCG-MCNC: 4.6 G/DL (ref 3.5–5.2)
ALP SERPL-CCNC: 74 U/L (ref 35–104)
ALT SERPL W P-5'-P-CCNC: 18 U/L (ref 0–50)
ANION GAP SERPL CALCULATED.3IONS-SCNC: 12 MMOL/L (ref 7–15)
AST SERPL W P-5'-P-CCNC: 20 U/L (ref 0–45)
BILIRUB SERPL-MCNC: 0.8 MG/DL
BUN SERPL-MCNC: 8.8 MG/DL (ref 6–20)
CALCIUM SERPL-MCNC: 9.3 MG/DL (ref 8.6–10)
CHLORIDE SERPL-SCNC: 103 MMOL/L (ref 98–107)
CREAT SERPL-MCNC: 0.73 MG/DL (ref 0.51–0.95)
DEPRECATED HCO3 PLAS-SCNC: 27 MMOL/L (ref 22–29)
EGFRCR SERPLBLD CKD-EPI 2021: >90 ML/MIN/1.73M2
FERRITIN SERPL-MCNC: 74 NG/ML (ref 6–175)
GLUCOSE SERPL-MCNC: 82 MG/DL (ref 70–99)
POTASSIUM SERPL-SCNC: 3.6 MMOL/L (ref 3.4–5.3)
PROT SERPL-MCNC: 7.8 G/DL (ref 6.4–8.3)
SODIUM SERPL-SCNC: 142 MMOL/L (ref 135–145)

## 2023-11-17 ENCOUNTER — OFFICE VISIT (OUTPATIENT)
Dept: VASCULAR SURGERY | Facility: CLINIC | Age: 38
End: 2023-11-17
Payer: COMMERCIAL

## 2023-11-17 DIAGNOSIS — I83.891 SYMPTOMATIC VARICOSE VEINS OF RIGHT LOWER EXTREMITY: ICD-10-CM

## 2023-11-17 DIAGNOSIS — I83.811 VARICOSE VEINS OF LEG WITH PAIN, RIGHT: Primary | ICD-10-CM

## 2023-11-17 PROCEDURE — 99203 OFFICE O/P NEW LOW 30 MIN: CPT | Performed by: SURGERY

## 2023-11-17 NOTE — PROGRESS NOTES
[facility] CONSULTATION    HPI:    Arely Elena is a pleasant 38 year old female referred by Dr. Gissell Medina for evaluation of right leg varicose veins with pain.  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  She is the mother of 3 children and is seeing them grow larger with each of her pregnancies.    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  She has 3 children with the youngest only 2-year-old.    She has no history of deep vein thrombosis, superficial thrombophlebitis or hemorrhage.  She admits to swelling of the leg after being on her feet for long periods of time, especially if she is not wearing compression hose.    Her family history significant for varicose veins in her father and a paternal grandmother.  There is no family history of venous thromboembolism.  Her mother had cancer and had a DVT as result.      PAST MEDICAL HISTORY:   Past Medical History:   Diagnosis Date    Asthma     Varicella        PAST SURGICAL HISTORY:   Past Surgical History:   Procedure Laterality Date    FOOT SURGERY      cyst removal    wisdom tooth removal         FAMILY HISTORY:   Family History   Problem Relation Age of Onset    Ovarian Cancer Mother 57    Hypertension Maternal Grandmother     Glaucoma Maternal Grandmother     Macular Degeneration Maternal Grandmother        SOCIAL HISTORY:   Social History     Tobacco Use    Smoking status: Never    Smokeless tobacco: Never   Substance Use Topics    Alcohol use: Not Currently       REVIEW OF SYSTEMS:  Review Of Systems  Skin: Itching  Eyes: negative  Ears/Nose/Throat: Strep throat-treated  Respiratory: No shortness of breath, dyspnea on exertion, cough, or hemoptysis  Cardiovascular: negative  Gastrointestinal: Occasional indigestion/reflux  Genitourinary: negative  Musculoskeletal: Leg pain, leg swelling  Neurologic: negative  Psychiatric: negative  Hematologic/Lymphatic/Immunologic: negative  Endocrine: negative      Vital signs:  LMP  (LMP Unknown)     Current Outpatient Medications   Medication Sig Dispense Refill    albuterol (PROAIR HFA/PROVENTIL HFA/VENTOLIN HFA) 108 (90 Base) MCG/ACT inhaler Inhale 2 puffs into the lungs every 6 hours as needed for shortness of breath / dyspnea or wheezing 18 g 3    azithromycin (ZITHROMAX) 250 MG tablet Take 2 tablets (500 mg) by mouth daily for 1 day, THEN 1 tablet (250 mg) daily for 4 days. 6 tablet 0    levonorgestrel (MIRENA) 20 MCG/DAY IUD 1 each (20 mcg) by Intrauterine route once         PHYSICAL EXAM:  General: Pleasant, NAD.   HEENT: Normocephalic, atraumatic, external ears and nose normal.   Respiratory: Normal respiratory effort.   Cardiovascular: Pulse is regular.   Musculoskeletal: Gait and station normal.  The joints of her fingers and toes without deformity.  There is no cyanosis of her nailbeds.   EXTREMITIES: Right lower extremity: 8 mm vein on the mid medial right thigh and at the level of the right knee.  4 to 6 mm varicosities over the medial right leg.  No stasis changes or edema.    Left lower extremity: No varicose veins, stasis changes or edema..    PULSES: R/L (3=normal pulse, 0=no palpable pulse) dorsalis pedis: 3/3; posterior tibial: 3/3.      Neurologic: Grossly normal  Psychiatric: Mood, affect, judgment and insight are normal     ASSESSMENT:  Progressive right leg varicose veins with pain; symptoms are now interfering with actives of daily living despite conservative measures of compression hose, leg elevation, dietary measures and exercise.   We discussed the lower extremity vein anatomy and the pathophysiology of venous insufficiency utilizing a leg vein drawing.  The option of continued conservative measures with risk of superficial thrombophlebitis, bleeding and progression of disease process were discussed.    She wishes to obtain a right lower extremity venous competency study and to have treatment as indicated.  We discussed options for endovenous ablation in the office setting utilizing either thermal and nonthermal techniques with risk and benefits of each.  Management of branch varicosities, given the size of her veins, would likely be best with microphlebectomy of the thigh and knee level varicosities.  The lower leg varicosities could either be treated with sclerotherapy or phlebectomies.  She voiced understanding of our discussion and her questions answered.    PLAN:  Right lower extremity venous competency study with video visit to discuss results     Tanner Balbuena MD    Dictated using Dragon voice recognition software which may result in transcription errors          [facility] LEG DRAWING:

## 2023-11-17 NOTE — LETTER
11/17/2023         RE: Arely Elena  [address] [place] [id number]        Dear Colleague,    Thank you for referring your patient, Arely Elena, to the [facility] [place]. Please see a copy of my visit note below.    [facility] CONSULTATION    HPI:    Arely Elena is a pleasant 38 year old female referred by Dr. Gissell Medina for evaluation of right leg varicose veins with pain.  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  She is the mother of 3 children and is seeing them grow larger with each of her pregnancies.    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  She has 3 children with the youngest only 2-year-old.    She has no history of deep vein thrombosis, superficial thrombophlebitis or hemorrhage.  She admits to swelling of the leg after being on her feet for long periods of time, especially if she is not wearing compression hose.    Her family history significant for varicose veins in her father and a paternal grandmother.  There is no family history of venous thromboembolism.  Her mother had cancer and had a DVT as result.      PAST MEDICAL HISTORY:   Past Medical History:   Diagnosis Date     Asthma      Varicella        PAST SURGICAL HISTORY:   Past Surgical History:   Procedure Laterality Date     FOOT SURGERY      cyst removal     wisdom tooth removal         FAMILY HISTORY:   Family History   Problem Relation Age of Onset     Ovarian Cancer Mother 57     Hypertension Maternal Grandmother       Glaucoma Maternal Grandmother      Macular Degeneration Maternal Grandmother        SOCIAL HISTORY:   Social History     Tobacco Use     Smoking status: Never     Smokeless tobacco: Never   Substance Use Topics     Alcohol use: Not Currently       REVIEW OF SYSTEMS: Review Of Systems  Skin: Itching  Eyes: negative  Ears/Nose/Throat: Strep throat-treated  Respiratory: No shortness of breath, dyspnea on exertion, cough, or hemoptysis  Cardiovascular: negative  Gastrointestinal: Occasional indigestion/reflux  Genitourinary: negative  Musculoskeletal: Leg pain, leg swelling  Neurologic: negative  Psychiatric: negative  Hematologic/Lymphatic/Immunologic: negative  Endocrine: negative      Vital signs:  LMP  (LMP Unknown)     Current Outpatient Medications   Medication Sig Dispense Refill     albuterol (PROAIR HFA/PROVENTIL HFA/VENTOLIN HFA) 108 (90 Base) MCG/ACT inhaler Inhale 2 puffs into the lungs every 6 hours as needed for shortness of breath / dyspnea or wheezing 18 g 3     azithromycin (ZITHROMAX) 250 MG tablet Take 2 tablets (500 mg) by mouth daily for 1 day, THEN 1 tablet (250 mg) daily for 4 days. 6 tablet 0     levonorgestrel (MIRENA) 20 MCG/DAY IUD 1 each (20 mcg) by Intrauterine route once         PHYSICAL EXAM:  General: Pleasant, NAD.   HEENT: Normocephalic, atraumatic, external ears and nose normal.   Respiratory: Normal respiratory effort.   Cardiovascular: Pulse is regular.   Musculoskeletal: Gait and station normal.  The joints of her fingers and toes without deformity.  There is no cyanosis of her nailbeds.   EXTREMITIES: Right lower extremity: 8 mm vein on the mid medial right thigh and at the level of the right knee.  4 to 6 mm varicosities over the medial right leg.  No stasis changes or edema.    Left lower extremity: No varicose veins, stasis changes or edema..    PULSES: R/L (3=normal pulse, 0=no palpable pulse) dorsalis pedis: 3/3; posterior tibial: 3/3.      Neurologic: Grossly  normal  Psychiatric: Mood, affect, judgment and insight are normal     ASSESSMENT:  Progressive right leg varicose veins with pain; symptoms are now interfering with actives of daily living despite conservative measures of compression hose, leg elevation, dietary measures and exercise.  We discussed the lower extremity vein anatomy and the pathophysiology of venous insufficiency utilizing a leg vein drawing.  The option of continued conservative measures with risk of superficial thrombophlebitis, bleeding and progression of disease process were discussed.    She wishes to obtain a right lower extremity venous competency study and to have treatment as indicated.  We discussed options for endovenous ablation in the office setting utilizing either thermal and nonthermal techniques with risk and benefits of each.  Management of branch varicosities, given the size of her veins, would likely be best with microphlebectomy of the thigh and knee level varicosities.  The lower leg varicosities could either be treated with sclerotherapy or phlebectomies.  She voiced understanding of our discussion and her questions answered.    PLAN:  Right lower extremity venous competency study with video visit to discuss results     Tanner Balbuena MD    Dictated using Dragon voice recognition software which may result in transcription errors          [facility] LEG DRAWING:                Again, thank you for allowing me to participate in the care of your patient.        Sincerely,        Tanner Balbuena MD

## 2023-11-17 NOTE — NURSING NOTE
Patient Reported symptoms:    Right leg   Heaviness A good bit of the time   Achiness A good bit of the time   Swelling None of the time   Throbbing None of the time   Itching Some of the time   Appearance Moderately noticeable   Impact on work/activities Moderately reduced

## 2023-11-21 ENCOUNTER — ANCILLARY PROCEDURE (OUTPATIENT)
Dept: ULTRASOUND IMAGING | Facility: CLINIC | Age: 38
End: 2023-11-21
Attending: SURGERY
Payer: COMMERCIAL

## 2023-11-21 DIAGNOSIS — I83.811 VARICOSE VEINS OF LEG WITH PAIN, RIGHT: ICD-10-CM

## 2023-11-21 PROCEDURE — 93971 EXTREMITY STUDY: CPT | Mod: RT | Performed by: SURGERY

## 2023-11-29 ENCOUNTER — VIRTUAL VISIT (OUTPATIENT)
Dept: VASCULAR SURGERY | Facility: CLINIC | Age: 38
End: 2023-11-29
Attending: SURGERY
Payer: COMMERCIAL

## 2023-11-29 DIAGNOSIS — I83.891 SYMPTOMATIC VARICOSE VEINS OF RIGHT LOWER EXTREMITY: Primary | ICD-10-CM

## 2023-11-29 PROCEDURE — 99442 PR PHYSICIAN TELEPHONE EVALUATION 11-20 MIN: CPT | Mod: 93 | Performed by: SURGERY

## 2023-11-29 NOTE — LETTER
11/29/2023         RE: Arely Elena  [address]  [facility] [address]        Dear Colleague,    Thank you for referring your patient, Arely Elena, to the [facility] [place]. Please see a copy of my visit note below.    Arely is a 38 year old who is being evaluated via a billable video visit.      How would you like to obtain your AVS? MyChart  If the video visit is dropped, the invitation should be resent by: Text to cell phone: [phone]  Will anyone else be joining your video visit? No    Video-Visit Details    Type of service:  Video Visit     Video visit start time: 11:31 AM    Video visit end time: 11:41 AM    Originating Location (pt. Location): Home    Distant Location (provider location):  On-site  Platform used for Video Visit: Sr[employer]Pago    [facility] [place] Progress Note    Arely Elena presents in follow-up of right leg varicose veins with pain.  Please see my dictation of 11/17/2023 for details.  She returned for a right lower extremity venous competency study on 11/21/2023, the results which we will discuss on today's video visit.    Physical Exam  General: Pleasant female in no acute distress.  Blood pressure 115/75, pulse 60  Extremities: Right lower extremity: 8 mm vein on the mid medial right thigh and at the level of the right knee.  4 to 6 mm varicosities over the medial right leg.  No stasis changes or edema.     Left lower extremity: No varicose veins, stasis changes or edema    Ultrasound:  INDICATION:  Rt leg varicose veins with pain     EXAM TYPE  RIGHT LOWER EXTREMITY VENOUS DUPLEX FOR VENOUS INSUFFICIENCY  TECHNICAL SUMMARY     A duplex ultrasound study using color flow was performed, to evaluate the right lower extremity veins for valvular incompetence with the patient in a steep reversed trendelenberg.      RIGHT:     The deep veins demonstrate phasic flow, compress and respond to augmentations.  There is no DVT.  The common  femoral vein is incompetent and free of thrombus. The remaining deep veins are competent and free of thrombus.      The GSV demonstrates phasic flow, compresses and responds to augmentations from the saphenofemoral junction to the ankle with no evidence of  thrombus. The great saphenous vein measures 6.3 mm at the saphenofemoral junction, 3.3 mm at the proximal thigh and 10.5 mm at the knee. The GSV courses superficial from the mid thigh to proximal calf.  The GSV is incompetent from SFJ  to Knee and again at the distal calf,  with the greatest reflux time of 5485 milliseconds.  The GSV gives rise to multiple incompetent varicose veins, the largest measures 3.2 mm off the Proximal Calf that courses Medial with a reflux time of 3596 milliseconds.     The AASV is incompetent ( 1.2 mm) draining into the saphenofemoral junction. The AASV is incompetent at the saphenofemoal junction with a reflux time of 2771 milliseconds. The AASV (0.6 mm)  is too small to assess competency at the proximal thigh.      The Giacomini vein is too small to assess competency but appear patent ( 0.7 mm) communicating with the small saphenous vein at the knee level.      The SSV is too small to assess flow (0.8 mm) but appears patent. The saphenopopliteal junction is absent.      Perforators: there is no evidence of incompetent  veins at any level.      LEFT:     The CFV demonstrate phasic flow, compress and respond to augmentations.  There is no DVT.       FINAL SUMMARY:  Right lower extremity:  Deep veins  1.  No deep antibiosis  2.  Common femoral vein incompetence.  The remaining deep vein valves are competent     Superficial veins  1.  No superficial vein thrombosis  2.  Right great saphenous vein incompetence from the saphenofemoral junction through the knee and at the ankle, the source of multiple, incompetent varicosities in the calf  3.  Right anterior accessory saphenous-great saphenous vein junction incompetence, otherwise  the anterior accessory saphenous vein is competent.      veins  No incompetent perforators     Left lower extremity:  No common femoral vein thrombosis with normal phasicity and augmentation     Incompetence Criteria: Greater than 500 milliseconds reflux in the superficial and  veins and greater than 1000 milliseconds reflux in the deep veins.      Assessment:  Right leg varicose veins with pain secondary to right great saphenous vein incompetence.  We discussed her lower extremity venous competency study utilizing a leg vein drawing.    Her right great saphenous vein is incompetent from the saphenofemoral junction to the knee, measures 3.3 mm in diameter in the proximal thigh with reflux time of 3.3 seconds and 10.5 mm in diameter at the level of the knee with reflux time of 5.4 seconds.  Is the source of multiple incompetent vein branches in the calf measuring up to 3.2 mm in diameter with reflux time of 3.5 seconds.  We discussed options of continued conservative management with small risk of superficial thrombophlebitis, bleeding and progression of the disease process.    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  This interferes with actives of daily living because it have to stop what she is doing and elevate her legs to relieve her pain.  This occurs despite the use of compression hose on a regular basis.    She is a candidate for radiofrequency ablation of the right great saphenous vein.  I would hope that with radiofrequency ablation alone we could reduce her symptoms and allow the large veins on the thigh and at the level of the knee to involute.  If this did not relieve her symptoms, any residual veins could be treated with medically necessary, ultrasound-guided  sclerotherapy.    Risk with procedure including bleeding, infection, nerve injury and deep vein thrombosis were discussed.  She voiced understanding and her questions were answered.    Plan:  Radiofrequency ablation of the right great saphenous vein    Tanner Balbuena MD, FACS            Again, thank you for allowing me to participate in the care of your patient.        Sincerely,        Tanner Balbuena MD

## 2023-11-29 NOTE — PROGRESS NOTES
November 29, 2023    Vein Procedure Recommendation    Called and spoke with patient.    Dr. Balbuena has recommended patient to have the following vein procedure(s):     1. Right leg VNUS closure GSV      Then, later if needed:    2. Right leg Ultrasound Guided Sclerotherapy (medically necessary) 2 sessions    Patient Pre-op Questions:  Preferred [facility]  Anticoagulant/ASA: No  Artificial Joint or Heart Valve:  No  Open ulcer:  No  Sedation nausea: No    Patient is recommended to wear Thigh High compression hose following her procedure. Per patient request, faxed her compression hose Rx to [facility] at [phone]. Patient would like 1 pair(s) of black, open-toe, thigh high, 20-30mmhg compression hose. Fax confirmed. Patient is aware the compression hose will be shipped to her home address.    Entered in patient's After Visit Summary (viewable in [employer]) written procedure instructions to review on her own (see After Visit Summary).    Next steps:    Insurance Submission  Informed patient this process could take up to 14 business days, but once approved, the patient will be contacted by our surgery scheduler to schedule the above procedure. Gave patient our surgery scheduler's information.    Patient is in agreement with all of the above and has no further questions at this time.    Maddy Elam RN  [facility]  Vein Clinic

## 2023-11-29 NOTE — PROGRESS NOTES
Arely is a 38 year old who is being evaluated via a billable video visit.      How would you like to obtain your AVS? MyChart  If the video visit is dropped, the invitation should be resent by: Text to cell phone: [phone]  Will anyone else be joining your video visit? No    Video-Visit Details    Type of service:  Video Visit     Video visit start time: 11:31 AM    Video visit end time: 11:41 AM    Originating Location (pt. Location): Home    Distant Location (provider location):  On-site  Platform used for Video Visit: [facility] [place] Progress Note    Arely Elena presents in follow-up of right leg varicose veins with pain.  Please see my dictation of 11/17/2023 for details.  She returned for a right lower extremity venous competency study on 11/21/2023, the results which we will discuss on today's video visit.    Physical Exam  General: Pleasant female in no acute distress.  Blood pressure 115/75, pulse 60  Extremities: Right lower extremity: 8 mm vein on the mid medial right thigh and at the level of the right knee.  4 to 6 mm varicosities over the medial right leg.  No stasis changes or edema.     Left lower extremity: No varicose veins, stasis changes or edema    Ultrasound:  INDICATION:  Rt leg varicose veins with pain     EXAM TYPE  RIGHT LOWER EXTREMITY VENOUS DUPLEX FOR VENOUS INSUFFICIENCY  TECHNICAL SUMMARY     A duplex ultrasound study using color flow was performed, to evaluate the right lower extremity veins for valvular incompetence with the patient in a steep reversed trendelenberg.      RIGHT:     The deep veins demonstrate phasic flow, compress and respond to augmentations.  There is no DVT.  The common femoral vein is incompetent and free of thrombus. The remaining deep veins are competent and free of thrombus.      The GSV demonstrates phasic flow, compresses and responds to augmentations from the saphenofemoral junction to the ankle with no evidence of   thrombus. The great saphenous vein measures 6.3 mm at the saphenofemoral junction, 3.3 mm at the proximal thigh and 10.5 mm at the knee. The GSV courses superficial from the mid thigh to proximal calf.  The GSV is incompetent from SFJ  to Knee and again at the distal calf,  with the greatest reflux time of 5485 milliseconds.  The GSV gives rise to multiple incompetent varicose veins, the largest measures 3.2 mm off the Proximal Calf that courses Medial with a reflux time of 3596 milliseconds.     The AASV is incompetent ( 1.2 mm) draining into the saphenofemoral junction. The AASV is incompetent at the saphenofemoal junction with a reflux time of 2771 milliseconds. The AASV (0.6 mm)  is too small to assess competency at the proximal thigh.      The Giacomini vein is too small to assess competency but appear patent ( 0.7 mm) communicating with the small saphenous vein at the knee level.      The SSV is too small to assess flow (0.8 mm) but appears patent. The saphenopopliteal junction is absent.      Perforators: there is no evidence of incompetent  veins at any level.      LEFT:     The CFV demonstrate phasic flow, compress and respond to augmentations.  There is no DVT.       FINAL SUMMARY:  Right lower extremity:  Deep veins  1.  No deep antibiosis  2.  Common femoral vein incompetence.  The remaining deep vein valves are competent     Superficial veins  1.  No superficial vein thrombosis  2.  Right great saphenous vein incompetence from the saphenofemoral junction through the knee and at the ankle, the source of multiple, incompetent varicosities in the calf  3.  Right anterior accessory saphenous-great saphenous vein junction incompetence, otherwise the anterior accessory saphenous vein is competent.      veins  No incompetent perforators     Left lower extremity:  No common femoral vein thrombosis with normal phasicity and augmentation     Incompetence Criteria: Greater than 500  milliseconds reflux in the superficial and  veins and greater than 1000 milliseconds reflux in the deep veins.      Assessment:  Right leg varicose veins with pain secondary to right great saphenous vein incompetence.  We discussed her lower extremity venous competency study utilizing a leg vein drawing.    Her right great saphenous vein is incompetent from the saphenofemoral junction to the knee, measures 3.3 mm in diameter in the proximal thigh with reflux time of 3.3 seconds and 10.5 mm in diameter at the level of the knee with reflux time of 5.4 seconds.  Is the source of multiple incompetent vein branches in the calf measuring up to 3.2 mm in diameter with reflux time of 3.5 seconds.  We discussed options of continued conservative management with small risk of superficial thrombophlebitis, bleeding and progression of the disease process.    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  This interferes with actives of daily living because it have to stop what she is doing and elevate her legs to relieve her pain.  This occurs despite the use of compression hose on a regular basis.    She is a candidate for radiofrequency ablation of the right great saphenous vein.  I would hope that with radiofrequency ablation alone we could reduce her symptoms and allow the large veins on the thigh and at the level of the knee to involute.  If this did not relieve her symptoms, any residual veins could be treated with medically necessary, ultrasound-guided sclerotherapy.    Risk with procedure including bleeding, infection, nerve injury and deep vein thrombosis were discussed.  She voiced understanding and her questions were answered.    Plan:  Radiofrequency ablation of the right great saphenous vein    Tanner  Dona Balbuena MD, FACS

## 2024-01-12 DIAGNOSIS — I83.891 SYMPTOMATIC VARICOSE VEINS OF RIGHT LOWER EXTREMITY: Primary | ICD-10-CM

## 2024-03-04 ENCOUNTER — MYC MEDICAL ADVICE (OUTPATIENT)
Dept: FAMILY MEDICINE | Facility: CLINIC | Age: 39
End: 2024-03-04
Payer: COMMERCIAL

## 2024-03-06 ENCOUNTER — MYC MEDICAL ADVICE (OUTPATIENT)
Dept: VASCULAR SURGERY | Facility: CLINIC | Age: 39
End: 2024-03-06
Payer: COMMERCIAL

## 2024-03-06 PROBLEM — Z80.0 FAMILY HISTORY OF COLON CANCER: Status: ACTIVE | Noted: 2024-03-06

## 2024-03-06 NOTE — TELEPHONE ENCOUNTER
Called and informed patient that her procedure was still on the 27th of March and that there was an error on the date. Continue as planned.  Informed patient that a nurse would be calling her 7-10 days prior to procedure to go over procedure information. Patient verbalized understanding. No further questions at this time.

## 2024-03-06 NOTE — TELEPHONE ENCOUNTER
Note and recommendation was added to her chart (problem list and Primary care post-it notes)   MD Adam

## 2024-03-21 ENCOUNTER — TELEPHONE (OUTPATIENT)
Dept: VASCULAR SURGERY | Facility: CLINIC | Age: 39
End: 2024-03-21
Payer: COMMERCIAL

## 2024-03-21 DIAGNOSIS — I83.811 VARICOSE VEINS OF LEG WITH PAIN, RIGHT: Primary | ICD-10-CM

## 2024-03-21 RX ORDER — CLONIDINE HYDROCHLORIDE 0.1 MG/1
TABLET ORAL
Qty: 1 TABLET | Refills: 0 | Status: SHIPPED | OUTPATIENT
Start: 2024-03-21 | End: 2024-03-29

## 2024-03-21 RX ORDER — LORAZEPAM 1 MG/1
TABLET ORAL
Qty: 2 TABLET | Refills: 0 | Status: SHIPPED | OUTPATIENT
Start: 2024-03-21 | End: 2024-03-29

## 2024-03-21 NOTE — PATIENT INSTRUCTIONS
Pre-Procedure Instructions:                                        VNUS Closure   You are having a VNUS Closure. One or more of your veins will be closed with radiofrequency heating.    Insurance  Precertification and/or referral authorization may be required by your insurance company.  We will call your insurance company to verify benefits for the medically necessary part of your procedure.    Your Current Medications and Allergies  Are you on blood thinner medications? (Aspirin, Plavix, Coumadin, Eliquis, Xarelto) Please discuss this with your surgeon.  Are you sensitive to latex or adhesives used for fake fingernails? Please let us know!     Driving Escort and   Please arrange to have a trusted adult (18 years old or older) drive you to and from the clinic.  For your safety, we recommend you have a trusted adult to stay with you until the next morning.    Your Health  If you have a change in your health before the procedure, contact our office immediately.  (For example: cold symptoms, cough, urinary tract infection, fever, flu symptoms.)  A pre-procedure physical is not required.    Note  It is sometimes necessary to adjust the procedure schedule due to emergencies. We greatly appreciate your flexibility and understanding in this matter.  ____________________________________________________________________________________  Check List:  The Morning of Your Procedure  ___1.  Please do not apply anything on your leg(s) or shave the day of your procedure.  ___2.  You may take your normal medications the day of your procedure.  ___3.  It is recommended you eat a light breakfast or lunch on the day of your procedure.  ___4.  Wear comfortable loose-fitting clothing and wide-fitting shoes (i.e. tennis shoes, slip-ons).  ___5.  Please arrive at our clinic at the specified time given by the nurse.  ___6.  You will sign an affirmation of informed consent.  ___7.  Bring your pre-procedure sedation medication  (lorazepam and clonidine) with you to the clinic.              One hour before your procedure, you will be instructed to take these medications. The lorazepam              (Ativan) lowers anxiety and sedates you; the clonidine makes the lorazepam more effective. Everyone's              body processes these medications differently. Therefore, reactions to these medications vary. Some              people stay awake and some people sleep through the whole procedure. You may not remember              everything about the procedure or the day. You do not want to make any big decisions for the rest of the              day.         The Day of Your Procedure:                  VNUS Closure  In the Exam Room  A nurse will bring you back to an exam room with your family member or friend. This is when your informed consent will be signed, and you will take your pre-procedure medications.  You will be asked to remove everything from the waist down, including undergarments. You will then put on a hospital gown or shorts and blue booties.  Your surgeon will come in to answer any questions and cathy the appropriate leg(s) with a marker.  You will be taken to the restroom to empty your bladder before going into the procedure room.    In the Procedure Room  You will be escorted to the procedure room. You will lie on a procedure table covered with a sheet or blanket.  A nurse will put a blood pressure cuff on your arm and a pulse/oxygen monitor on a finger. Your vital signs will be monitored every 15 minutes.  Your gown will be pulled up slightly and the groin exposed for a short period of time. The surgeon's assistant will clean your foot, leg, and groin with an antibacterial solution. We will get you covered up as quickly as possible!  Sterile towels and blue drapes will be used to cover you and the table. You will be asked to keep your hands under the blue drapes during the procedure.    The Procedure  The surgeon will visualize  your veins with an ultrasound machine. He or she will then numb your skin and access the vein. A catheter is passed up the vein and positioned with ultrasound guidance. The table will then be tipped head down.  Once the catheter is in the correct position, medication will be injected to numb your leg. You will feel some needle sticks and may feel discomfort as the medication goes in. Once this is done, you should not experience significant discomfort. But if you do, please let us know and more numbing medication can be injected. As the catheter sends out heat, the vein closes off and the catheter is withdrawn.    Post-Procedure  Once the procedure is done, your leg will be washed with warm water and dried. You will have one or more small bandages covering your incisions. Your compression hose will be put on or an ACE wrap from your toes to groin. If the ACE wrap feels too tight or binds, please elevate your leg and loosen it.  You will be offered something to drink and a light snack.  You will rest with your leg(s) elevated for approximately 30 minutes. Your friend or family member may join you.   For your safety, you will be accompanied to your car by a staff member.      Post-Procedure Instructions:                          VNUS Closure    Post-Op Day Zero - The Day of Your Procedure:  1. Medication for Pain Control and Inflammation Control   - The numbing medication injected during your procedure will last for several hours. The pre-procedure    tablets may make you very sleepy and you might not remember everything from the procedure or from    the day. This will usually wear off by the next day.   - Ibuprofen:  If tolerated, take ibuprofen (e.g., Advil) to reduce inflammation whether or not you have    pain. For three days, take two tablets (200mg each) with every meal and at bedtime with a snack. If    you are not able to take Ibuprofen, Tylenol is another option.   - You may resume taking any medications you  were taking before your procedure.  2. Activity   You may be up as tolerated when the sedation wears off. Elevate if possible when not walking.  3. Bandages   - You will have one or more small bandages covering the incision site(s) where we accessed the               vein(s). Keep your bandages on and dry for 48 hours. Compression hose should be worn continuously               over the bandages for the first 24 hours.  4. Incisions   - Bleeding: You may see some incision sites that are oozing through the bandages. This is not unusual    and can be managed with Rest, Ice, Compression and Elevation (RICE). Apply ice and firm pressure    directly to the site that is bleeding and rest with your leg(s) elevated above your heart for 20-30               minutes.    Post-Op Day One:  1. Medication   - Ibuprofen:  Continue the same as the Day of Your Procedure.  2. Activity   - Walk as tolerated. Resume your normal daily walking activities. If it hurts, stop. We encourage you to               walk. Elevate if possible when not walking.  3. Bandages and Compression   - After 24 hours, you may remove your compression hose to take a shower. Please keep your               bandage(s) on and intact. You may want to cover your bandage(s) to ensure it remains dry during your               shower. Reapply your compression hose after your shower and wear during waking hours only.  4. Driving   - You may resume driving when you can do so safely.    Post-Op Day Two:   1. Medication  - Ibuprofen:  Continue the same as the Day of Your Procedure.  2.  Activity   - Walk as tolerated. Elevate if possible when not walking.  3. Bandages and Compression  - You may remove your bandage after 48 hours. Continue wearing your compression hose during waking hours only for a total of seven days following your procedure.  4. Incisions   - Your leg(s) may be bruised at or near incision site(s) and possibly have numb spots. This is normal.   5. Call Us  If:   - You see any areas on your leg that are red and angry in appearance.   - You notice any drainage that is milky or cloudy in appearance or that has a foul odor.   - You run a temperature of 100.5 or greater.      Post-Op Day Three:  You will have a follow up appointment 2-4 days post-procedure. At this appointment, you will have an ultrasound and we will check your incisions.        The Two Weeks Following Your Procedure  1.  Skin Care              - Do not use any lotions, creams, or powders on your incision(s) for 14 days or until the incisions have               healed.              - Do not soak in a bathtub, hot tub or go swimming for 14 days or until your incisions have healed.  2.  Medications   - You may use ibuprofen or acetaminophen (e.g., Tylenol) as needed for pain or discomfort.  3.  Activity   - Do not lift over 25 pounds. After about two weeks you may resume exercise such as aerobics,               running, tennis or weightlifting. Use your common sense and ease back into your exercise routine              slowly.   - You may feel a cord-like tightness along the inside of your leg. Gentle stretching can be helpful.  4. Compression Hose   - Your doctor may instruct you to wear compression for longer than seven days; please    follow your doctor's instructions. As a comfort measure, you may choose to wear compression for    longer than required.  5.  Travel   - Do not fly in an airplane for 14 days after your procedure.  If you have a long car trip planned within    two to three weeks following your procedure, stop and walk for a few minutes every two hours.    Periodic ankle pumps during the ride may be helpful.    Six Week Appointment  - At your six-week appointment, you will see your surgeon for an exam and evaluation. This office visit               will be scheduled when you return for post-op day three return appointment.     Return to Work  1.  If you work outside the home, you may return  to work in a few days depending on the extent of your    procedure, how you tolerate it, and the type of work you perform.  2.  Paperwork: If your employer requires paperwork or you would like a letter written to your employer, please    let us know. We will complete disability type forms at no charge. Please allow five business days for    forms to be completed.

## 2024-03-21 NOTE — TELEPHONE ENCOUNTER
3/21/2024    Vein Clinic Preoperative Nurse Call    Procedure: Right leg VNUS closure GSV(med nec)  Date: Wednesday 3/27  Surgeon: Dr. Balbuena  Time: 1100  Check in time: 1000    Called and spoke to pt. Informed patient: when to check in (1000) to sign consent, to bring their preop medications in their original bottle with them (2mg ativan, 0.1mg clonidine). Patient will take the medications after signing the consent to the procedure. Instructed patient to wear loose-fitting comfortable clothing, and bring their compression hose. Ensured patient has a /someone that will be responsible for them the rest of the day. Once procedure is completed, we will keep patient in recovery for 30-45 mins, and call  with aftercare instructions.    Pt needs Thigh High compression hose for procedure. Status of the hose: patient has thigh high hose.    Patient understands if they have any of the following symptoms (fever, cough, shortness of breath, rash), they need to notify us immediately as they may need to cancel their procedure and reschedule for a later date.    Gave patient our call back number if any further questions or concerns.    Maddy Elam RN  [facility]

## 2024-03-27 ENCOUNTER — OFFICE VISIT (OUTPATIENT)
Dept: VASCULAR SURGERY | Facility: CLINIC | Age: 39
End: 2024-03-27
Payer: COMMERCIAL

## 2024-03-27 VITALS — SYSTOLIC BLOOD PRESSURE: 110 MMHG | OXYGEN SATURATION: 99 % | DIASTOLIC BLOOD PRESSURE: 61 MMHG | HEART RATE: 68 BPM

## 2024-03-27 DIAGNOSIS — I83.811 VARICOSE VEINS OF RIGHT LOWER EXTREMITY WITH PAIN: Primary | ICD-10-CM

## 2024-03-27 PROCEDURE — 36475 ENDOVENOUS RF 1ST VEIN: CPT | Mod: RT | Performed by: SURGERY

## 2024-03-27 NOTE — PROGRESS NOTES
Pre-procedure Nursing Note    Arely Elena presents to clinic for Vein Procedure  .   /Person Responsible for Patient: Bal (Spouse)  **working in the [employer]**    Prophylactic Medication:N/A   Sedation Medication: Ativan, 2mg ,   Time Taken: 1017 and Clonidine, 0.1mg,   Time Taken: 1017  Compression Stockings: Patient brought with today.  The procedure is being performed on RLE.  Patient understanding of procedure matches consent? YES    Patient's pre-procedure medications verified by Tanvi SOUSA RN.    Tanvi Boothe RN on 3/27/2024 at 10:13 AM

## 2024-03-27 NOTE — LETTER
3/27/2024         RE: Arely Elena  [address]  [place] [address]        Dear Colleague,    Thank you for referring your patient, Arely Elena, to the [facility] [place]. Please see a copy of my visit note below.    Pre-procedure Nursing Note    Arely Elena presents to clinic for Vein Procedure  .   /Person Responsible for Patient: Peter (Spouse)  **working in the lobby**    Prophylactic Medication:N/A   Sedation Medication: Ativan, 2mg ,   Time Taken: 1017 and Clonidine, 0.1mg,   Time Taken: 1017  Compression Stockings: Patient brought with today.  The procedure is being performed on E.  Patient understanding of procedure matches consent? YES    Patient's pre-procedure medications verified by Tanvi SOUSA RN.    Tanvi Boothe RN on 3/27/2024 at 10:13 AM        Vein Clinic Procedure Note    Indications:  Right leg varicose veins with pain; symptoms recalcitrant to conservative measures    Procedure:  Radiofrequency ablation right great saphenous vein    Procedure Description  Details of the procedure including risks of bleeding, infection, nerve injury, scarring, hyperpigmentation, deep vein thrombosis, recanalization of the great saphenous vein and recurrent varicose veins all discussed.  The patient voiced understanding and wished to proceed.  Informed consent was obtained.     I initialed her right lower extremity marking the operative site with indelible marker.  We then proceeded the operating room, had the patient lie supine on the operating table, then prepped and draped the right lower extremity sterilely.    We took a timeout to confirm the appropriate operative site and procedure: Radiofrequency ablation of the right great saphenous vein    VNUS Closure  I imaged the right lower extremity easily identifying the right great saphenous vein.  The vein became quite superficial in the distal third of the thigh, but I chose to access the vein just below the knee to  see if with tumescent I could deepen the vein to allow safe thermal ablation.  I infiltrated the skin overlying the vein just below the knee, placed a micropuncture needle into the vein followed by a micropuncture guidewire and a 6 Maltese sheath.    We passed the closure fast device through the sheath, positioning of the tip of the device 2.64 centimeters from the saphenofemoral junction under ultrasound guidance with the operating table in Trendelenburg position.  Tumescent anesthetic was injected along the course of the great saphenous vein under ultrasound guidance with care to confirm catheter tip position prior to infiltrating the tissues in this location.    After allowing the block to take effect and after confirming appropriate position of the catheter, I applied compression to the proximal thigh great saphenous vein with the ultrasound probe and additional width 2 fingerbreadths treating the first two 8 cm increments with 2 RF sessions each.  The remaining vein was treated with 1 RF session to each 8 cm increment.  We treated down to the mid thigh.  The patient was comfortable throughout.    After cleaning the pullback, I reimaged the vein and noted that the vein was noncompressible and closed.  The saphenofemoral junction and common femoral veins were fully compressible and free of thrombus.  This was documented.  The sheath and the catheter were removed and hemostasis secured with pressure.    The leg was cleaned with saline solution and a small gauze and Tegaderm dressing applied to the access site.  A compression hose was then applied.  We observed the patient for 30 minutes to ensure excellent hemostasis then took her to her 's car in a wheelchair.  Post procedure instructions were given to the patient and her  in verbal and written form and their questions answered.    The patient tolerated the procedure well without evidence of allergic reaction or other complications and will return in  72 hours for a right lower extremity venous ultrasound.    Katerina Closure    Date/Time: 3/27/2024 12:02 PM    Performed by: Tanner Balbuena MD  Authorized by: Tanner Balbuena MD    Time out: Immediately prior to the procedure a time out was called    Preparation: Patient was prepped and draped in usual sterile fashion    1st Assist:  Bisi He CST/CSFA    Procedure:  VNUS  Procedure side:  Right  One Vein    Vein Treated:  GSV  Patient tolerance:  Patient tolerated the procedure well with no immediate complications  Wrap/Hose:  Hose          3/27/2024    11:26 AM   Flowsheet Data   Procedure Start Time: 11:26   Prep: Chloraprep   Side: Right   Tx Length (cm): RIGHT GSV: 58   Junction (cm): RIGHT GSV: 2.64   RF Cycles: RIGHT GSV: 6   RF TX Time (Minutes): RIGHT GSV: 2:00   Sedation taken: Yes   Pre Pt. Physical / Cognitive Limitations: WNL   TOTAL Local anesthesia Injected (ml): 2   Max Volume Local Anesthesia (ml): 11   TOTAL Tumescent Injected volume (ml): 125   Max Volume Tumescent (ml): 572   Post Pt. Physical / Cognitive Limitations: WNL   Procedure End Time: 11:49   D/C Instructions given, states readiness to leave and escorted to car: Yes       SARITHA Balbuena MD    Dictated using Dragon voice recognition software which may result in transcription errors      Again, thank you for allowing me to participate in the care of your patient.        Sincerely,        Tanner Balbuena MD

## 2024-03-27 NOTE — PROGRESS NOTES
Vein Clinic Procedure Note    Indications:  Right leg varicose veins with pain; symptoms recalcitrant to conservative measures    Procedure:  Radiofrequency ablation right great saphenous vein    Procedure Description  Details of the procedure including risks of bleeding, infection, nerve injury, scarring, hyperpigmentation, deep vein thrombosis, recanalization of the great saphenous vein and recurrent varicose veins all discussed.  The patient voiced understanding and wished to proceed.  Informed consent was obtained.     I initialed her right lower extremity marking the operative site with indelible marker.  We then proceeded the operating room, had the patient lie supine on the operating table, then prepped and draped the right lower extremity sterilely.    We took a timeout to confirm the appropriate operative site and procedure: Radiofrequency ablation of the right great saphenous vein    VNUS Closure  I imaged the right lower extremity easily identifying the right great saphenous vein.  The vein became quite superficial in the distal third of the thigh, but I chose to access the vein just below the knee to see if with tumescent I could deepen the vein to allow safe thermal ablation.  I infiltrated the skin overlying the vein just below the knee, placed a micropuncture needle into the vein followed by a micropuncture guidewire and a 6 Kiswahili sheath.    We passed the closure fast device through the sheath, positioning of the tip of the device 2.64 centimeters from the saphenofemoral junction under ultrasound guidance with the operating table in Trendelenburg position.  Tumescent anesthetic was injected along the course of the great saphenous vein under ultrasound guidance with care to confirm catheter tip position prior to infiltrating the tissues in this location.    After allowing the block to take effect and after confirming appropriate position of the catheter, I applied compression to the proximal thigh  great saphenous vein with the ultrasound probe and additional width 2 fingerbreadths treating the first two 8 cm increments with 2 RF sessions each.  The remaining vein was treated with 1 RF session to each 8 cm increment.  We treated down to the mid thigh.  The patient was comfortable throughout.    After cleaning the pullback, I reimaged the vein and noted that the vein was noncompressible and closed.  The saphenofemoral junction and common femoral veins were fully compressible and free of thrombus.  This was documented.  The sheath and the catheter were removed and hemostasis secured with pressure.    The leg was cleaned with saline solution and a small gauze and Tegaderm dressing applied to the access site.  A compression hose was then applied.  We observed the patient for 30 minutes to ensure excellent hemostasis then took her to her 's car in a wheelchair.  Post procedure instructions were given to the patient and her  in verbal and written form and their questions answered.    The patient tolerated the procedure well without evidence of allergic reaction or other complications and will return in 72 hours for a right lower extremity venous ultrasound.    Katerina Closure    Date/Time: 3/27/2024 12:02 PM    Performed by: Tanner Balbuena MD  Authorized by: Tanner Balbuena MD    Time out: Immediately prior to the procedure a time out was called    Preparation: Patient was prepped and draped in usual sterile fashion    1st Assist:  Bisi He CST/MALLORIE    Procedure:  VNUS  Procedure side:  Right  One Vein    Vein Treated:  GSV  Patient tolerance:  Patient tolerated the procedure well with no immediate complications  Wrap/Hose:  Hose          3/27/2024    11:26 AM   Flowsheet Data   Procedure Start Time: 11:26   Prep: Chloraprep   Side: Right   Tx Length (cm): RIGHT GSV: 58   Junction (cm): RIGHT GSV: 2.64   RF Cycles: RIGHT GSV: 6   RF TX Time (Minutes): RIGHT GSV: 2:00    Sedation taken: Yes   Pre Pt. Physical / Cognitive Limitations: WNL   TOTAL Local anesthesia Injected (ml): 2   Max Volume Local Anesthesia (ml): 11   TOTAL Tumescent Injected volume (ml): 125   Max Volume Tumescent (ml): 572   Post Pt. Physical / Cognitive Limitations: WNL   Procedure End Time: 11:49   D/C Instructions given, states readiness to leave and escorted to car: Yes       C Dona Balbuena MD    Dictated using Dragon voice recognition software which may result in transcription errors

## 2024-03-29 ENCOUNTER — ANCILLARY PROCEDURE (OUTPATIENT)
Dept: ULTRASOUND IMAGING | Facility: CLINIC | Age: 39
End: 2024-03-29
Attending: SURGERY
Payer: COMMERCIAL

## 2024-03-29 ENCOUNTER — ALLIED HEALTH/NURSE VISIT (OUTPATIENT)
Dept: VASCULAR SURGERY | Facility: CLINIC | Age: 39
End: 2024-03-29
Attending: SURGERY
Payer: COMMERCIAL

## 2024-03-29 DIAGNOSIS — Z09 POSTOP CHECK: Primary | ICD-10-CM

## 2024-03-29 DIAGNOSIS — I83.891 SYMPTOMATIC VARICOSE VEINS OF RIGHT LOWER EXTREMITY: ICD-10-CM

## 2024-03-29 PROCEDURE — 93971 EXTREMITY STUDY: CPT | Mod: RT | Performed by: SURGERY

## 2024-03-29 PROCEDURE — 99207 PR NO CHARGE NURSE ONLY: CPT

## 2024-03-29 NOTE — PROGRESS NOTES
March 29, 2024    Vein Clinic Postoperative Nurse Note    Patient is here for her 48 hour postoperative visit.    Procedure: Right leg VNUS closure GSV(med nec)  Procedure Date: 3/27/24  Surgeon: Dr. Balbuena    Ultrasound Result: The right GSV is closed 14.3mm from SFJ to distal thigh. No evidence of RLE DVT.    Physical Exam: Incision is approximated without signs of infection.  Ecchymosis: none  Swelling: none  Paresthesia: pt denies numbness    Patient Questions or Concerns: Overall, pt is doing well. Pt questioning the bulging vein right above the access site. Instructed pt to give her leg several weeks to heal and this inflammation under the skin should settle down.    Pt is able to don her thigh high compression hose.    Reviewed postoperative instructions with patient and provided her with written material of common things to expect from her procedure.    Patient's Next Vein Clinic Appointment: 6 week post op with Dr. Balbuena (5/14/24).    Maddy Elam RN  [facility]

## 2024-04-24 ENCOUNTER — OFFICE VISIT (OUTPATIENT)
Dept: DERMATOLOGY | Facility: CLINIC | Age: 39
End: 2024-04-24
Payer: COMMERCIAL

## 2024-04-24 DIAGNOSIS — L82.1 SEBORRHEIC KERATOSES: ICD-10-CM

## 2024-04-24 DIAGNOSIS — Z80.8 FAMILY HISTORY OF MELANOMA: ICD-10-CM

## 2024-04-24 DIAGNOSIS — D22.9 MULTIPLE BENIGN NEVI: ICD-10-CM

## 2024-04-24 DIAGNOSIS — D49.2 NEOPLASM OF UNSPECIFIED BEHAVIOR OF BONE, SOFT TISSUE, AND SKIN: ICD-10-CM

## 2024-04-24 DIAGNOSIS — L81.4 SOLAR LENTIGO: ICD-10-CM

## 2024-04-24 DIAGNOSIS — D18.01 CHERRY ANGIOMA: ICD-10-CM

## 2024-04-24 DIAGNOSIS — Z12.83 SKIN CANCER SCREENING: Primary | ICD-10-CM

## 2024-04-24 DIAGNOSIS — L30.1 DYSHIDROTIC ECZEMA: ICD-10-CM

## 2024-04-24 DIAGNOSIS — D23.9 DERMATOFIBROMA: ICD-10-CM

## 2024-04-24 PROCEDURE — 11104 PUNCH BX SKIN SINGLE LESION: CPT | Performed by: PHYSICIAN ASSISTANT

## 2024-04-24 PROCEDURE — 88305 TISSUE EXAM BY PATHOLOGIST: CPT | Mod: 26 | Performed by: DERMATOLOGY

## 2024-04-24 PROCEDURE — 99203 OFFICE O/P NEW LOW 30 MIN: CPT | Mod: 25 | Performed by: PHYSICIAN ASSISTANT

## 2024-04-24 PROCEDURE — 88305 TISSUE EXAM BY PATHOLOGIST: CPT | Mod: TC | Performed by: PHYSICIAN ASSISTANT

## 2024-04-24 RX ORDER — TRIAMCINOLONE ACETONIDE 1 MG/G
OINTMENT TOPICAL 2 TIMES DAILY
Qty: 60 G | Refills: 1 | Status: SHIPPED | OUTPATIENT
Start: 2024-04-24

## 2024-04-24 RX ORDER — TACROLIMUS 1 MG/G
OINTMENT TOPICAL 2 TIMES DAILY
Qty: 60 G | Refills: 2 | Status: SHIPPED | OUTPATIENT
Start: 2024-04-24

## 2024-04-24 ASSESSMENT — PAIN SCALES - GENERAL: PAINLEVEL: NO PAIN (0)

## 2024-04-24 NOTE — PATIENT INSTRUCTIONS
Checking for Skin Cancer  You can help find cancer early by checking your skin each month. There are 3 main kinds of skin cancer: melanoma, basal cell carcinoma, and squamous cell carcinoma. Doing monthly skin checks is the best way to find new marks, sores, or skin changes. Follow these instructions for checking your skin.   The ABCDEs of checking moles for melanoma   Check your moles or growths for signs of melanoma using ABCDE:   Asymmetry: The sides of the mole or growth don t match.  Border: The edges are ragged, notched, or blurred.  Color: The color within the mole or growth varies. It could be black, brown, tan, white, or shades of red, gray, or blue.  Diameter: The mole or growth is larger than   inch or 6 mm (size of a pencil eraser).  Evolving: The size, shape, texture, or color of the mole or growth is changing.     ABCDE's of moles on light skin.        ABCDE's of moles on dark skin may be harder to identify.     Checking for other types of skin cancer  Basal cell carcinoma or squamous cell carcinoma cause symptoms like:     A spot or mole that looks different from all other marks on your skin  Changes in how an area feels, such as itching, tenderness, or pain  Changes in the skin's surface, such as oozing, bleeding, or scaliness  A sore that doesn't heal  New swelling, redness, or spread of color beyond the border of a mole    Who s at risk?  Anyone of any skin color can get skin cancer. But you're at greater risk if you have:   Fair skin that freckles easily and burns instead of tanning  Light-colored or red hair  Light-colored eyes  Many moles or abnormal moles on your skin  A long history of unprotected exposure to sunlight or tanning beds  A history of many blistering sunburns as a child or teen  A family history of skin cancer  Been exposed to radiation or chemicals  A weakened immune system  Been exposed to arsenic  If you've had skin cancer in the past, you're at high risk of having it again.    How to check your skin  Do your monthly skin checkups in front of a full-length mirror. Use a room with good lighting so it's easier to see. Use a hand mirror to look at hard-to-see places like your buttocks and back. You can also have a trusted friend or family member help you with these checks. Check every part of your body, including your:   Head (ears, face, neck, and scalp)  Torso (front, back, sides, and under breasts)  Arms (tops, undersides, and armpits)  Hands (palms, backs, and fingers, including under the nails)  Lower back, buttocks, and genitals  Legs (front, back, and sides)  Feet (tops, soles, toes, including under the nails, and between toes)  Watch for new spots on your skin or a spot that's changing in color, shape, size.   If you have a lot of moles, take digital photos of them each month. Make sure to take photos both up close and from a distance. These can help you see if any moles change over time.   Know your skin  Most skin changes aren't cancer. But if you see any changes in your skin, call your healthcare provider right away. Only they can tell you if a change is a problem. If you have skin cancer, seeing your provider can be the first step to getting the treatment that could save your life.   [employer] last reviewed this educational content on 10/1/2021    0824-6734 [employer]. All rights reserved. This information is not intended as a substitute for professional medical care. Always follow your healthcare professional's instructions.         Gentle Skin Care    Below is a list of products our providers recommend for gentle skin care.    Moisturizers:  Lighter: Exederm Intensive Moisture Cream, Cetaphil Cream, CeraVe, Aveeno Positively Radiant and Vanicream Light   Thicker: Aquaphor Ointment, Vaseline, Petroleum Jelly, Eucerin Original Healing Cream, Vanicream Cream, CeraVe Healing Ointment, Aquaphor Body Spray  Avoid Lotions (too thin)  Mild Cleansers:  Dove Fragrance-free  Bar or Wash  CeraVe   Vanicream Cleansing Bar  Cetaphil Cleanser   Aquaphor 2-in-1 Gentle Wash and Shampoo  Dove Baby Wash  Exederm Body Wash       Laundry Products:  All Free and Clear Products  Cheer Free  Generic brands are okay as long as they are fragrance-free  Avoid fabric softeners and dryer sheets   Sunscreens: SPF 30 or greater   Sunscreens that contain zinc oxide and/or titanium dioxide should be applied. These are physical blockers. One or both of these should be listed in the active Ingredients.  Any other listed ingredients under the active ingredients would be a chemically-based sunscreen, which might be irritating.  Spray sunscreens should be avoided because these are typically chemical sunscreens.      Shampoo and Conditioners:  Free and Clear by Vanicream  Aquaphor 2-in-1 Gentle Wash and Shampoo   Oils:  Mineral Oil   Emu Oil   For some patients: coconut (raw, unrefined, organic) and sunflower seed oil      Keep in mind:  Generic products are an okay substitute, but make sure they are fragrance-free.  Reading the product ingredients list is very important.  Avoid product that have fragrance added to them.   Organic does not mean fragrance-free. In fact, patients with sensitive skin can become quite irritated by some organic products.     Recommendations:  Daily bathing. Make sure you are applying a good moisturizer after bathing every time.  Apply moisturizing creams at least twice daily to the whole body. Your provider may recommend a lighter or heavier moisturizer based on the severity of your skin condition and that time of year it is.  Creams are more moisturizing than lotions.     Care Plan:  Keep bathing and showering short, less than 15 minutes.  Always use lukewarm warm when possible. AVOID hot or cold water.  DO NOT use bubble bath.  Limit the use of soaps. Focus on skin folds, face, armpits, groin, and feet towards the end of the bath.  Do NOT vigorously scrub when you cleanse the  skin.  After bathing, PAT your skin lightly with a towel. DO NOT rub or scrub when drying.  ALWAYS apply a moisturizer immediately after bathing. This helps to lock in moisture. *IF YOU WERE PRESCRIBED A TOPICAL MEDICATION, APPLY YOUR MEDICATION FIRST, AND THEN COVER WITH YOUR DAILY MOISTURIZER.*  Reapply moisturizing agents to your whole body at least twice daily.    Other helpful tips:  Do not use products such as powders, perfumes, or colognes on your skin.  Diffusers can be harsh on sensitive skin. Use with caution if you have sensitive skin.  Avoid saunas and steam baths. This temperature is too hot.  Avoid tight or scratchy clothing, such as wool.  Always wash new clothing before wearing them for the first time.  Sometimes a humidifier or vaporizer can be used at night can help the dry skin. Remember to keep these items clean to avoid mold growth.    Who should I call with questions?  [facility]: [phone]  [facility]: [phone]  For urgent needs outside of business hours call the [facility] at [phone] and ask for the dermatology resident on call.   Wound Care After a Biopsy    What is a skin biopsy?  A skin biopsy allows the doctor to examine a very small piece of tissue under the microscope to determine the diagnosis and the best treatment for the skin condition. A local anesthetic (numbing medicine) is injected with a very small needle into the skin area to be tested. A small piece of skin is taken from the area. Sometimes a suture (stitch) is used.     What are the risks of a skin biopsy?  I will experience scar, bleeding, swelling, pain, crusting and redness. I may experience incomplete removal or recurrence. Risks of this procedure are excessive bleeding, bruising, infection, nerve damage, numbness, thick (hypertrophic or keloidal) scar and non-diagnostic biopsy.    How should I care for my wound for the first 24  hours?  Keep the wound dry and covered for 24 hours  If it bleeds, hold direct pressure on the area for 15 minutes. If bleeding does not stop, call us or go to the emergency room  Avoid strenuous exercise the first 1-2 days or as your doctor instructs you    How should I care for the wound after 24 hours?  After 24 hours, remove the bandage  You may bathe or shower as normal  If you had a scalp biopsy, you can shampoo as usual and can use shower water to clean the biopsy site daily  Clean the wound once a day with gentle soap and water  Do not scrub, be gentle  Apply white petroleum/Vaseline after cleaning the wound with a cotton swab or a clean finger, and keep the site covered with a Bandaid /bandage. Bandages are not necessary with a scalp biopsy  If you are unable to cover the site with a Bandaid /bandage, re-apply ointment 2-3 times a day to keep the site moist. Moisture will help with healing  Avoid strenuous activity for first 1-2 days  Avoid lakes, rivers, pools, and oceans until the stitches are removed or the site is healed    How do I clean my wound?  Wash hands thoroughly with soap or use hand  before all wound care  Clean the wound with gentle soap and water  Apply white petroleum/Vaseline  to wound after it is clean  Replace the Bandaid /bandage to keep the wound covered for the first few days or as instructed by your doctor  If you had a scalp biopsy, warm shower water to the area on a daily basis should suffice    What should I use to clean my wound?   Cotton-tipped applicators (Qtips )  White petroleum jelly (Vaseline ). Use a clean new container and use Q-tips to apply.  Bandaids  as needed  Gentle soap     How should I care for my wound long term?  Do not get your wound dirty  Keep up with wound care for one week or until the area is healed.  A small scab will form and fall off by itself when the area is completely healed. The area will be red and will become pink in color as it heals.  Sun protection is very important for how your scar will turn out. Sunscreen with an SPF 30 or greater is recommended once the area is healed.  You should have some soreness but it should be mild and slowly go away over several days. Talk to your doctor about using tylenol for pain,    When should I call my doctor?  If you have increased:   Pain or swelling  Pus or drainage (clear or slightly yellow drainage is ok)  Temperature over 100F  Spreading redness or warmth around wound    When will I hear about my results?  The biopsy results can take 2 weeks to come back.  Your results will automatically release to [employer] before your provider has even reviewed them.  The clinic will call you with the results, send you a [employer] message, or have you schedule a follow-up clinic or phone time to discuss the results.  Contact our clinics if you do not hear from us in 2 weeks.    Who should I call with questions?  [facility]: [phone]  [facility]: [phone]  For urgent needs outside of business hours call the [facility] at [phone] and ask for the dermatology resident on call

## 2024-04-24 NOTE — LETTER
4/24/2024       RE: Arely Elena  [address]  [facility] [address]     Dear Colleague,    Thank you for referring your patient, Arely Elena, to the [facility] DERMATOLOG[facility] [place] at [facility]. Please see a copy of my visit note below.    [facility] Dermatology Note  Encounter Date: Apr 24, 2024  Office Visit     Dermatology Problem List:  1. NUB x1  - s/p punch bx 04/24/2024  2. Dyshidrotic Eczema  - current tx: Tacrolimus 0.1% ointment, Triamcinolone 0.1% ointment    ____________________________________________    Assessment & Plan:    # Neoplasm of unspecified behavior of the skin (D49.2) on the L upper abdomen. The differential diagnosis includes dysplastic nevus vs other.   - Punch biopsy performed today (see procedure note(s) below).    # Dyshidrotic Eczema, hands.    - Moisturize: Recommend good OTC moisturizer to full body, such as Cera-Ve, Addis-cream, or Cetaphil. Advised best to apply after bathing to lock in moisture.  - start Tacrolimus 0.1% ointment bid for maintenance for high risk areas.   - start Triamcinolone 0.1% ointment bid when sites are active and flaring.     Return to clinic or communicate with staff/ Mychart if hands not under control.       # Skin cancer screening with multiple benign nevi, solar lentigines,   - ABCDEs: Counseled ABCDEs of melanoma: Asymmetry, Border (irregularity), Color (not uniform, changes in color), Diameter (greater than 6 mm which is about the size of a pencil eraser), and Evolving (any changes in preexisting moles).  - Sun protection: Counseled SPF30+ sunscreen, UPF clothing, sun avoidance, tanning bed avoidance.    # Cherry angiomas and seborrheic keratoses, dermatofibroma (right knee)  Benign, reassurance given.     # Family hx of melanoma in 2nd degree relative (maternal grandfather )and family hx of NMSC  -Recommend annual screenings.     Procedures Performed:   -  Punch biopsy procedure note, location(s): L upper abdomen. After discussion of benefits and risks including but not limited to bleeding, infection, scar, incomplete removal, recurrence, and non-diagnostic biopsy, written consent and photographs were obtained. The area was cleaned with isopropyl alcohol. 0.5mL of 1% lidocaine with epinephrine was injected to obtain adequate anesthesia and a 4 mm punch biopsy was performed at site(s). 4-0 Prolene sutures were utilized to approximate the epidermal edges. White petrolatum ointment and a bandage was applied to the wound. Explicit verbal and written wound care instructions were provided. The patient left the dermatology clinic in good condition.     Follow-up: 1 year(s) in-person, or earlier for new or changing lesions    Staff and Scribe:   Scribe Disclosure:   By signing my name below, I, Ioana Blanchardsuzy, attest that this documentation has been prepared under the direction and in the presence of Emily Echavarria PA-C.  - Electronically Signed: Ioana Kingston 04/24/24       Provider Disclosure:  I agree with above History, Review of Systems, Physical exam and Plan.  I have reviewed the content of the documentation and have edited it as needed. I have personally performed the services documented here and the documentation accurately represents those services and the decisions I have made.      Electronically signed by:  All risk, benefits and alternatives were discussed with patient.  Patient is in agreement and understands the assessment and plan.  All questions were answered.  Sun Screen Education was given.   Return to Clinic annually or sooner as needed.   Emily Echavarria PA-C        ____________________________________________    CC: Skin Check (FBSE.  Spots of concern on the leg (bumps)) and Derm Problem (Dermatitis on the fingers )    HPI:  Ms. Arely Elena is a(n) 39 year old female who presents today as a new patient for FBSC. Patient has never had a skin  check in the past. Patient reports fhx of BCC on both parents' sides, as well as NMSC. Her maternal grandfather is  from melanoma. Denies hx of tanning beds, but reports hx of summer sun exposure.    Patient notes dry hands which is mostly stable today, but reports they will crack. She constantly uses creams and lotions. She notes one area that has darkened and another that has grown. She wonders about a spot on hier knee.      Patient is otherwise feeling well, without additional skin concerns.    Labs Reviewed:  None reviewed.    Physical exam:  Vitals: There were no vitals taken for this visit.  GEN: This is a well developed, well-nourished female in no acute distress, in a pleasant mood.    SKIN: Full skin, which includes the head/face, both arms, chest, back, abdomen,both legs, genitalia and/or groin buttocks, digits and/or nails, was examined.  - There is a firm tan/flesh colored papule that dimples with lateral pressure on the R lateral knee.  - L upper abdomen 4 mm hyperpigmented papule r/o dysplastic nevus vs other  - many fingertips scaling and few deep seeded vesicles   - There are dome shaped bright red papules on the head/neck, trunk, extremities.   - Multiple regular brown pigmented macules and papules are identified on the head/neck, trunk, extremities.   - Scattered brown macules on sun exposed areas.  - There are waxy stuck on tan to brown papules on the head/neck, trunk, extremities.  - No other lesions of concern on areas examined.               Medications:  Current Outpatient Medications   Medication Sig Dispense Refill    albuterol (PROAIR HFA/PROVENTIL HFA/VENTOLIN HFA) 108 (90 Base) MCG/ACT inhaler Inhale 2 puffs into the lungs every 6 hours as needed for shortness of breath / dyspnea or wheezing 18 g 3    levonorgestrel (MIRENA) 20 MCG/DAY IUD 1 each (20 mcg) by Intrauterine route once       No current facility-administered medications for this visit.      Past Medical History:    Patient Active Problem List   Diagnosis    Asthma, mild intermittent    Pelvic floor dysfunction in female    Lack of coordination    Family history of malignant neoplasm of ovary    Allergic rhinitis    Need for Tdap vaccination    Family history of colon cancer     Past Medical History:   Diagnosis Date    Asthma     Varicella         CC Referred Self, MD  No address on file on close of this encounter.

## 2024-04-24 NOTE — NURSING NOTE
Lidocaine-epinephrine 1-1:644256 % injection   1.5mL once for one use, starting 4/24/2024 ending 4/24/2024,  2mL disp, R-0, injection  Injected by Kaylie Hernandez CMA

## 2024-04-24 NOTE — NURSING NOTE
Dermatology Rooming Note    Arely Elena's goals for this visit include:   Chief Complaint   Patient presents with    Skin Check     FBSE.  Spots of concern on the leg (bumps)    Derm Problem     Dermatitis on the fingers      Kaylie Hernandez CMA

## 2024-04-24 NOTE — PROGRESS NOTES
[facility] Dermatology Note  Encounter Date: Apr 24, 2024  Office Visit     Dermatology Problem List:  1. NUB x1  - s/p punch bx 04/24/2024  2. Dyshidrotic Eczema  - current tx: Tacrolimus 0.1% ointment, Triamcinolone 0.1% ointment    ____________________________________________    Assessment & Plan:    # Neoplasm of unspecified behavior of the skin (D49.2) on the L upper abdomen. The differential diagnosis includes dysplastic nevus vs other.   - Punch biopsy performed today (see procedure note(s) below).    # Dyshidrotic Eczema, hands.    - Moisturize: Recommend good OTC moisturizer to full body, such as Cera-Ve, Addis-cream, or Cetaphil. Advised best to apply after bathing to lock in moisture.  - start Tacrolimus 0.1% ointment bid for maintenance for high risk areas.   - start Triamcinolone 0.1% ointment bid when sites are active and flaring.     Return to clinic or communicate with staff/ Mychart if hands not under control.       # Skin cancer screening with multiple benign nevi, solar lentigines,   - ABCDEs: Counseled ABCDEs of melanoma: Asymmetry, Border (irregularity), Color (not uniform, changes in color), Diameter (greater than 6 mm which is about the size of a pencil eraser), and Evolving (any changes in preexisting moles).  - Sun protection: Counseled SPF30+ sunscreen, UPF clothing, sun avoidance, tanning bed avoidance.    # Cherry angiomas and seborrheic keratoses, dermatofibroma (right knee)  Benign, reassurance given.     # Family hx of melanoma in 2nd degree relative (maternal grandfather )and family hx of NMSC  -Recommend annual screenings.     Procedures Performed:   - Punch biopsy procedure note, location(s): L upper abdomen. After discussion of benefits and risks including but not limited to bleeding, infection, scar, incomplete removal, recurrence, and non-diagnostic biopsy, written consent and photographs were obtained. The area was cleaned with isopropyl alcohol. 0.5mL of 1%  lidocaine with epinephrine was injected to obtain adequate anesthesia and a 4 mm punch biopsy was performed at site(s). 4-0 Prolene sutures were utilized to approximate the epidermal edges. White petrolatum ointment and a bandage was applied to the wound. Explicit verbal and written wound care instructions were provided. The patient left the dermatology clinic in good condition.     Follow-up: 1 year(s) in-person, or earlier for new or changing lesions    Staff and Scribe:   Scribe Disclosure:   By signing my name below, I, Ioana Thee, attest that this documentation has been prepared under the direction and in the presence of Emily Echavarria PA-C.  - Electronically Signed: Ioana Kingston 25       Provider Disclosure:  I agree with above History, Review of Systems, Physical exam and Plan.  I have reviewed the content of the documentation and have edited it as needed. I have personally performed the services documented here and the documentation accurately represents those services and the decisions I have made.      Electronically signed by:  All risk, benefits and alternatives were discussed with patient.  Patient is in agreement and understands the assessment and plan.  All questions were answered.  Sun Screen Education was given.   Return to Clinic annually or sooner as needed.   Emily Echavarria PA-C        ____________________________________________    CC: Skin Check (FBSE.  Spots of concern on the leg (bumps)) and Derm Problem (Dermatitis on the fingers )    HPI:  Ms. Arely Elena is a(n) 39 year old female who presents today as a new patient for FBSC. Patient has never had a skin check in the past. Patient reports fhx of BCC on both parents' sides, as well as NMSC. Her maternal grandfather is  from melanoma. Denies hx of tanning beds, but reports hx of summer sun exposure.    Patient notes dry hands which is mostly stable today, but reports they will crack. She constantly uses creams  and lotions. She notes one area that has darkened and another that has grown. She wonders about a spot on hier knee.      Patient is otherwise feeling well, without additional skin concerns.    Labs Reviewed:  None reviewed.    Physical exam:  Vitals: There were no vitals taken for this visit.  GEN: This is a well developed, well-nourished female in no acute distress, in a pleasant mood.    SKIN: Full skin, which includes the head/face, both arms, chest, back, abdomen,both legs, genitalia and/or groin buttocks, digits and/or nails, was examined.  - There is a firm tan/flesh colored papule that dimples with lateral pressure on the R lateral knee.  - L upper abdomen 4 mm hyperpigmented papule r/o dysplastic nevus vs other  - many fingertips scaling and few deep seeded vesicles   - There are dome shaped bright red papules on the head/neck, trunk, extremities.   - Multiple regular brown pigmented macules and papules are identified on the head/neck, trunk, extremities.   - Scattered brown macules on sun exposed areas.  - There are waxy stuck on tan to brown papules on the head/neck, trunk, extremities.  - No other lesions of concern on areas examined.               Medications:  Current Outpatient Medications   Medication Sig Dispense Refill    albuterol (PROAIR HFA/PROVENTIL HFA/VENTOLIN HFA) 108 (90 Base) MCG/ACT inhaler Inhale 2 puffs into the lungs every 6 hours as needed for shortness of breath / dyspnea or wheezing 18 g 3    levonorgestrel (MIRENA) 20 MCG/DAY IUD 1 each (20 mcg) by Intrauterine route once       No current facility-administered medications for this visit.      Past Medical History:   Patient Active Problem List   Diagnosis    Asthma, mild intermittent    Pelvic floor dysfunction in female    Lack of coordination    Family history of malignant neoplasm of ovary    Allergic rhinitis    Need for Tdap vaccination    Family history of colon cancer     Past Medical History:   Diagnosis Date    Asthma      Varicella         CC Referred Self, MD  No address on file on close of this encounter.

## 2024-04-25 LAB
PATH REPORT.COMMENTS IMP SPEC: NORMAL
PATH REPORT.COMMENTS IMP SPEC: NORMAL
PATH REPORT.FINAL DX SPEC: NORMAL
PATH REPORT.GROSS SPEC: NORMAL
PATH REPORT.MICROSCOPIC SPEC OTHER STN: NORMAL
PATH REPORT.RELEVANT HX SPEC: NORMAL

## 2024-05-14 ENCOUNTER — OFFICE VISIT (OUTPATIENT)
Dept: VASCULAR SURGERY | Facility: CLINIC | Age: 39
End: 2024-05-14
Payer: COMMERCIAL

## 2024-05-14 DIAGNOSIS — I83.811 VARICOSE VEINS OF RIGHT LOWER EXTREMITY WITH PAIN: Primary | ICD-10-CM

## 2024-05-14 DIAGNOSIS — I83.811 VARICOSE VEINS OF LEG WITH PAIN, RIGHT: ICD-10-CM

## 2024-05-14 PROCEDURE — 99207 PR VEINSOLUTIONS POST OPERATIVE VISIT: CPT | Performed by: SURGERY

## 2024-05-14 NOTE — PROGRESS NOTES
[facility] [place] 6-week postop note    Arely Elena returns 6 weeks postop from radiofrequency ablation of her right great saphenous vein on 3/27/2024.  Overall she is doing well and has no complaints.    Exam  Induration along the mid medial thigh, the area where her great saphenous vein was the largest and where there was a large bulge in the vein projecting cutaneously.  There is also a firm, smaller protuberance immediately below the knee immediately, now the area of a large dilated segment of the vein preoperatively.  Both of these areas represent closed segments of the veins.    The varicose veins on the mid to distal medial right calf are also much smaller than they were preoperatively.    Assessment  Overall doing well.  She is pleased with the improvement in her leg.  I reassured her that the induration of the medial right thigh may take 9 months to a year to reach maximal improvement and the ecchymosis/hyperpigmentation will also take up to a year to reach its maximal improvement.    Plan  Return for a 6-month blood procedure right lower extremity ultrasound    C Dona Balbuena MD, FACS    Dictated using Dragon voice recognition software which may result in transcription errors

## 2024-05-14 NOTE — LETTER
5/14/2024         RE: Arely Elena  [address]  [place] [address]        Dear Colleague,    Thank you for referring your patient, Arely Elena, to the [facility] [place]. Please see a copy of my visit note below.    [facility] 6-week postop note    Arely Elena returns 6 weeks postop from radiofrequency ablation of her right great saphenous vein on 3/27/2024.  Overall she is doing well and has no complaints.    Exam  Induration along the mid medial thigh, the area where her great saphenous vein was the largest and where there was a large bulge in the vein projecting cutaneously.  There is also a firm, smaller protuberance immediately below the knee immediately, now the area of a large dilated segment of the vein preoperatively.  Both of these areas represent closed segments of the veins.    The varicose veins on the mid to distal medial right calf are also much smaller than they were preoperatively.    Assessment  Overall doing well.  She is pleased with the improvement in her leg.  I reassured her that the induration of the medial right thigh may take 9 months to a year to reach maximal improvement and the ecchymosis/hyperpigmentation will also take up to a year to reach its maximal improvement.    Plan  Return for a 6-month blood procedure right lower extremity ultrasound    SARITHA Balbuena MD, FACS    Dictated using Dragon voice recognition software which may result in transcription errors      Again, thank you for allowing me to participate in the care of your patient.        Sincerely,        Tanner Balbuena MD

## 2024-05-28 ENCOUNTER — MYC MEDICAL ADVICE (OUTPATIENT)
Dept: FAMILY MEDICINE | Facility: CLINIC | Age: 39
End: 2024-05-28
Payer: COMMERCIAL

## 2024-05-28 NOTE — TELEPHONE ENCOUNTER
Called patient,    Patient does not have any other current symptoms in place.  No pain or redness in leg.  No numbness in lower extremity    Patient had recent vascular procedure on R leg to include medial R thigh.  Advised patient to reach out to vascular clinic given procedure.  Patient verbalized understanding and will reach out to vascular clinic.    Wili AMOS RN     yes

## 2024-06-07 ENCOUNTER — OFFICE VISIT (OUTPATIENT)
Dept: SURGERY | Facility: CLINIC | Age: 39
End: 2024-06-07
Payer: COMMERCIAL

## 2024-06-07 VITALS
BODY MASS INDEX: 19.3 KG/M2 | SYSTOLIC BLOOD PRESSURE: 120 MMHG | HEIGHT: 67 IN | WEIGHT: 123 LBS | DIASTOLIC BLOOD PRESSURE: 80 MMHG | HEART RATE: 76 BPM

## 2024-06-07 DIAGNOSIS — M79.89 MASS OF SOFT TISSUE OF RIGHT LOWER EXTREMITY: Primary | ICD-10-CM

## 2024-06-07 PROCEDURE — 99212 OFFICE O/P EST SF 10 MIN: CPT | Performed by: STUDENT IN AN ORGANIZED HEALTH CARE EDUCATION/TRAINING PROGRAM

## 2024-06-07 NOTE — PROGRESS NOTES
Soft tissue mass of her right thigh.  [facility] General Surgery Clinic Consultation    CHIEF COMPLAINT:  Chief Complaint   Patient presents with    Consult     Lipoma right upper thigh        HISTORY OF PRESENT ILLNESS:  Arely Elena is a 39 year old female who is seen in consultation at the request of Dr. Oro for evaluation of soft tissue mass of her right thigh.  She noticed this small soft tissue mass recently.  She reports that it is not painful but has associated intermittent numbness.  It has not been red, grown, or had any significant changes to it.  She has a small similar soft tissue mass in her left arm.  That 1 has not been bothering her.  She has never had any soft tissue mass removed before.      REVIEW OF SYSTEMS:  Constitutional: No fevers or chills  Eyes: No blurred or double vision  HENT: Denies headaches, No rhinorrhea, No sore throat  Respiratory: No cough or shortness of breath  Cardiovascular: Denies chest pain or palpitations  Gastrointestinal: No abdominal pain or nausea/vomiting  Genitourinary: No hematuria or dysuria  Musculoskeletal: Denies arthralgias or myalgias  Neurologic: No numbness or tingling  Integumentary: No skin rashes    Past Medical History:   Diagnosis Date    Asthma     Varicella        Past Surgical History:   Procedure Laterality Date    FOOT SURGERY      cyst removal    wisdom tooth removal         Family History   Problem Relation Age of Onset    Ovarian Cancer Mother 57    Hypertension Maternal Grandmother     Glaucoma Maternal Grandmother     Macular Degeneration Maternal Grandmother     Melanoma Maternal Grandfather     Skin Cancer Maternal Aunt     Skin Cancer Paternal Aunt        Social History     Tobacco Use    Smoking status: Never    Smokeless tobacco: Never   Substance Use Topics    Alcohol use: Not Currently       Patient Active Problem List   Diagnosis    Asthma, mild intermittent    Pelvic floor dysfunction in female    Lack of coordination    Family  "history of malignant neoplasm of ovary    Allergic rhinitis    Need for Tdap vaccination    Family history of colon cancer       Allergies   Allergen Reactions    Sulfa Antibiotics     Penicillins Other (See Comments) and Rash     Unknown reaction         Current Outpatient Medications   Medication Sig Dispense Refill    albuterol (PROAIR HFA/PROVENTIL HFA/VENTOLIN HFA) 108 (90 Base) MCG/ACT inhaler Inhale 2 puffs into the lungs every 6 hours as needed for shortness of breath / dyspnea or wheezing 18 g 3    levonorgestrel (MIRENA) 20 MCG/DAY IUD 1 each (20 mcg) by Intrauterine route once      tacrolimus (PROTOPIC) 0.1 % external ointment Apply topically 2 times daily To areas on fingers that are at risk for eczema flares, to prevent recurrence. 60 g 2    triamcinolone (KENALOG) 0.1 % external ointment Apply topically 2 times daily During flares to the fingers until clear. 60 g 1       Vitals: /80   Pulse 76   Ht 1.689 m (5' 6.5\")   Wt 55.8 kg (123 lb)   BMI 19.56 kg/m    BMI= Body mass index is 19.56 kg/m .    EXAM:  General: Vital signs reviewed, in no apparent distress  Eyes: Anicteric  HENT: Normocephalic, atraumatic, trachea midline   Respiratory: Breathing nonlabored  Cardiovascular: Regular rate and rhythm  Musculoskeletal: Symmetric legs bilaterally.  Palpable small 1 cm round firm superficial soft tissue mass at the right anterior midline upper thigh.  Normal overlying skin.  Nontender to palpation.  Neurologic: Grossly nonfocal exam  Psychiatric: Normal mood, affect and insight  Integumentary: Warm and dry    All labs and imaging personally reviewed and significant for: None to review    ASSESSMENT:  Arely Elena is a 39 year old who presents with a very small 1 cm soft tissue mass likely lipoma.  He can be other soft tissue masses such as cysts, lymph node, or other soft tissue abnormalities.  We will obtain an ultrasound tube further assess the soft tissue mass.  We discussed watchful waiting " versus surgical excision.  If we were to proceed with surgery I recommend this can be done under local anesthesia.  Patient voiced understanding and is agreeable with the plan..  Significant pertinent co-morbidities include: None.       PLAN:  Ultrasound of soft tissue mass of right thigh.  Will follow-up with results via phone call.    It was my pleasure to participate in the care of Arely Elena in clinic today. Thank you for this consultation.         Curtis Pennington MD    Please route or send letter to:  Primary Care Provider (PCP) and Referring Provider

## 2024-06-07 NOTE — LETTER
June 7, 2024          Referred MD Shorty  No address on file      RE:   Arely Elena 1985      Dear Colleague,    Thank you for referring your patient, Arely Elena, to [facility] at {[facility] - SITE LOCATIONS:[id number]}. Please see a copy of my visit note below.    Soft tissue mass of her right thigh.  [facility] General Surgery Clinic Consultation    CHIEF COMPLAINT:  Chief Complaint   Patient presents with    Consult     Lipoma right upper thigh        HISTORY OF PRESENT ILLNESS:  Arely Elean is a 39 year old female who is seen in consultation at the request of Dr. Oro for evaluation of soft tissue mass of her right thigh.  She noticed this small soft tissue mass recently.  She reports that it is not painful but has associated intermittent numbness.  It has not been red, grown, or had any significant changes to it.  She has a small similar soft tissue mass in her left arm.  That 1 has not been bothering her.  She has never had any soft tissue mass removed before.      REVIEW OF SYSTEMS:  Constitutional: No fevers or chills  Eyes: No blurred or double vision  HENT: Denies headaches, No rhinorrhea, No sore throat  Respiratory: No cough or shortness of breath  Cardiovascular: Denies chest pain or palpitations  Gastrointestinal: No abdominal pain or nausea/vomiting  Genitourinary: No hematuria or dysuria  Musculoskeletal: Denies arthralgias or myalgias  Neurologic: No numbness or tingling  Integumentary: No skin rashes    Past Medical History:   Diagnosis Date    Asthma     Varicella        Past Surgical History:   Procedure Laterality Date    FOOT SURGERY      cyst removal    wisdom tooth removal         Family History   Problem Relation Age of Onset    Ovarian Cancer Mother 57    Hypertension Maternal Grandmother     Glaucoma Maternal Grandmother     Macular Degeneration Maternal Grandmother     Melanoma Maternal Grandfather     Skin Cancer Maternal Aunt     Skin Cancer Paternal Aunt        Social  "History     Tobacco Use    Smoking status: Never    Smokeless tobacco: Never   Substance Use Topics    Alcohol use: Not Currently       Patient Active Problem List   Diagnosis    Asthma, mild intermittent    Pelvic floor dysfunction in female    Lack of coordination    Family history of malignant neoplasm of ovary    Allergic rhinitis    Need for Tdap vaccination    Family history of colon cancer       Allergies   Allergen Reactions    Sulfa Antibiotics     Penicillins Other (See Comments) and Rash     Unknown reaction         Current Outpatient Medications   Medication Sig Dispense Refill    albuterol (PROAIR HFA/PROVENTIL HFA/VENTOLIN HFA) 108 (90 Base) MCG/ACT inhaler Inhale 2 puffs into the lungs every 6 hours as needed for shortness of breath / dyspnea or wheezing 18 g 3    levonorgestrel (MIRENA) 20 MCG/DAY IUD 1 each (20 mcg) by Intrauterine route once      tacrolimus (PROTOPIC) 0.1 % external ointment Apply topically 2 times daily To areas on fingers that are at risk for eczema flares, to prevent recurrence. 60 g 2    triamcinolone (KENALOG) 0.1 % external ointment Apply topically 2 times daily During flares to the fingers until clear. 60 g 1       Vitals: /80   Pulse 76   Ht 1.689 m (5' 6.5\")   Wt 55.8 kg (123 lb)   BMI 19.56 kg/m    BMI= Body mass index is 19.56 kg/m .    EXAM:  General: Vital signs reviewed, in no apparent distress  Eyes: Anicteric  HENT: Normocephalic, atraumatic, trachea midline   Respiratory: Breathing nonlabored  Cardiovascular: Regular rate and rhythm  Musculoskeletal: Symmetric legs bilaterally.  Palpable small 1 cm round firm superficial soft tissue mass at the right anterior midline upper thigh.  Normal overlying skin.  Nontender to palpation.  Neurologic: Grossly nonfocal exam  Psychiatric: Normal mood, affect and insight  Integumentary: Warm and dry    All labs and imaging personally reviewed and significant for: None to review    ASSESSMENT:  Arely Elena is a 39 " year old who presents with a very small 1 cm soft tissue mass likely lipoma.  He can be other soft tissue masses such as cysts, lymph node, or other soft tissue abnormalities.  We will obtain an ultrasound tube further assess the soft tissue mass.  We discussed watchful waiting versus surgical excision.  If we were to proceed with surgery I recommend this can be done under local anesthesia.  Patient voiced understanding and is agreeable with the plan..  Significant pertinent co-morbidities include: None.       PLAN:  Ultrasound of soft tissue mass of right thigh.  Will follow-up with results via phone call.    It was my pleasure to participate in the care of Arely Elena in clinic today. Thank you for this consultation.         Curtis Pennington MD    Please route or send letter to:  Primary Care Provider (PCP) and Referring Provider      Again, thank you for allowing me to participate in the care of your patient.      Sincerely,      {[facility] PROVIDERS:[id number]}    ***/***      D: ***  T: ***

## 2024-06-18 ENCOUNTER — HOSPITAL ENCOUNTER (OUTPATIENT)
Dept: ULTRASOUND IMAGING | Facility: CLINIC | Age: 39
Discharge: HOME | End: 2024-06-18
Attending: STUDENT IN AN ORGANIZED HEALTH CARE EDUCATION/TRAINING PROGRAM | Admitting: STUDENT IN AN ORGANIZED HEALTH CARE EDUCATION/TRAINING PROGRAM
Payer: COMMERCIAL

## 2024-06-18 DIAGNOSIS — M79.89 MASS OF SOFT TISSUE OF RIGHT LOWER EXTREMITY: ICD-10-CM

## 2024-06-18 PROCEDURE — 76882 US LMTD JT/FCL EVL NVASC XTR: CPT | Mod: RT

## 2024-06-25 ENCOUNTER — TRANSFERRED RECORDS (OUTPATIENT)
Dept: HEALTH INFORMATION MANAGEMENT | Facility: CLINIC | Age: 39
End: 2024-06-25
Payer: COMMERCIAL

## 2024-07-23 ENCOUNTER — TRANSFERRED RECORDS (OUTPATIENT)
Dept: HEALTH INFORMATION MANAGEMENT | Facility: CLINIC | Age: 39
End: 2024-07-23
Payer: COMMERCIAL

## 2024-11-26 ENCOUNTER — OFFICE VISIT (OUTPATIENT)
Dept: VASCULAR SURGERY | Facility: CLINIC | Age: 39
End: 2024-11-26
Attending: SURGERY
Payer: COMMERCIAL

## 2024-11-26 ENCOUNTER — ANCILLARY PROCEDURE (OUTPATIENT)
Dept: ULTRASOUND IMAGING | Facility: CLINIC | Age: 39
End: 2024-11-26
Attending: SURGERY
Payer: COMMERCIAL

## 2024-11-26 DIAGNOSIS — I83.811 VARICOSE VEINS OF RIGHT LOWER EXTREMITY WITH PAIN: ICD-10-CM

## 2024-11-26 DIAGNOSIS — I83.811 VARICOSE VEINS OF LEG WITH PAIN, RIGHT: Primary | ICD-10-CM

## 2024-11-26 PROCEDURE — 93971 EXTREMITY STUDY: CPT | Mod: RT | Performed by: SURGERY

## 2024-11-26 PROCEDURE — 99213 OFFICE O/P EST LOW 20 MIN: CPT | Performed by: SURGERY

## 2024-11-26 NOTE — PROGRESS NOTES
[facility] [place] Progress Note    Arely Elena presents in follow-up of radiofrequency ablation of her right great saphenous vein on 3/27/2024.  Overall she is doing well and has no complaints.    Physical Exam  General: Pleasant female in no acute distress.    Extremities: Right lower extremity: 2 cm area of induration and mild hyperpigmentation on the mid medial thigh.  No areas of visible veins or hyperpigmentation appreciated.  The veins that were visible on her right medial calf are no longer visible.    Ultrasound:  Name:  Arely Elena                                            Patient ID: [id number]  Date: 2024                                          : 1985  Sex: female                                                                 Examined by: ASHLEIGH Michelle RVT  Age:  39 year old                                                         Reading MD: SARITHA Balbuena     INDICATIONS:    Post VNUS Closure     EXAM TYPE  RIGHT LOWER EXTREMITY VENOUS DUPLEX   6 month POST VNUS CLOSURE  GSV     TECHNICAL SUMMARY     Multiple transverse and longitudinal images of right lower extremity were obtained.     RIGHT:       The CFV demonstrates phasic flow, compresses and responds to augmentations.  No evidence of DVT at this time.  The remainder of deep veins including left femoral, popliteal, posterior tibial and peroneal veins are widely patent and fully compressible with no evidence for DVT at this time.     The GSV is closed 3.5 mm from the SFJ to the distal calf with evidence of thrombus seen throughout.        FINAL SUMMARY:  Right CFV is patent.  The right GSV is closed 3.5 mm from the SFJ to the distal calf.     SARITHA Balbuena MD, FACS    Assessment:  Overall doing well with a closed great saphenous vein.    Plan:  Return on an as-needed basis.    Tanner Balbuena MD, FACS    Dictated using Dragon voice recognition software which may result in  transcription errors

## 2024-11-26 NOTE — LETTER
2024      Arely Elena  [address]  [place] [address]      Dear Colleague,    Thank you for referring your patient, Arely Elena, to the [facility] [place]. Please see a copy of my visit note below.    [facility] [place] Progress Note    Arely Elena presents in follow-up of radiofrequency ablation of her right great saphenous vein on 3/27/2024.  Overall she is doing well and has no complaints.    Physical Exam  General: Pleasant female in no acute distress.    Extremities: Right lower extremity: 2 cm area of induration and mild hyperpigmentation on the mid medial thigh.  No areas of visible veins or hyperpigmentation appreciated.  The veins that were visible on her right medial calf are no longer visible.    Ultrasound:  Name:  Arely Elena                                            Patient ID: [id number]  Date: 2024                                          : 1985  Sex: female                                                                 Examined by: ASHLEIGH Michelle RVT  Age:  39 year old                                                         Reading MD: SARITHA Balbuena     INDICATIONS:    Post VNUS Closure     EXAM TYPE  RIGHT LOWER EXTREMITY VENOUS DUPLEX   6 month POST VNUS CLOSURE  GSV     TECHNICAL SUMMARY     Multiple transverse and longitudinal images of right lower extremity were obtained.     RIGHT:       The CFV demonstrates phasic flow, compresses and responds to augmentations.  No evidence of DVT at this time.  The remainder of deep veins including left femoral, popliteal, posterior tibial and peroneal veins are widely patent and fully compressible with no evidence for DVT at this time.     The GSV is closed 3.5 mm from the SFJ to the distal calf with evidence of thrombus seen throughout.        FINAL SUMMARY:  Right CFV is patent.  The right GSV is closed 3.5 mm from the SFJ to the distal calf.     SARITHA Balbuena,  MD, FACS    Assessment:  Overall doing well with a closed great saphenous vein.    Plan:  Return on an as-needed basis.    Tanner Balbuena MD, FACS    Dictated using Dragon voice recognition software which may result in transcription errors          Again, thank you for allowing me to participate in the care of your patient.        Sincerely,        Tanner Balbuena MD

## 2025-01-12 ENCOUNTER — HEALTH MAINTENANCE LETTER (OUTPATIENT)
Age: 40
End: 2025-01-12

## 2025-02-18 ENCOUNTER — MYC MEDICAL ADVICE (OUTPATIENT)
Dept: FAMILY MEDICINE | Facility: CLINIC | Age: 40
End: 2025-02-18

## 2025-04-03 ENCOUNTER — TRANSFERRED RECORDS (OUTPATIENT)
Dept: HEALTH INFORMATION MANAGEMENT | Facility: CLINIC | Age: 40
End: 2025-04-03
Payer: COMMERCIAL

## 2025-04-23 ENCOUNTER — OFFICE VISIT (OUTPATIENT)
Dept: DERMATOLOGY | Facility: CLINIC | Age: 40
End: 2025-04-23
Attending: PHYSICIAN ASSISTANT
Payer: COMMERCIAL

## 2025-04-23 DIAGNOSIS — D23.9 DERMATOFIBROMA: ICD-10-CM

## 2025-04-23 DIAGNOSIS — L81.4 SOLAR LENTIGO: ICD-10-CM

## 2025-04-23 DIAGNOSIS — Z12.83 SKIN CANCER SCREENING: ICD-10-CM

## 2025-04-23 DIAGNOSIS — D22.9 MULTIPLE BENIGN NEVI: ICD-10-CM

## 2025-04-23 DIAGNOSIS — L82.1 SEBORRHEIC KERATOSES: ICD-10-CM

## 2025-04-23 DIAGNOSIS — Z86.018 HISTORY OF DYSPLASTIC NEVUS: Primary | ICD-10-CM

## 2025-04-23 DIAGNOSIS — Z80.8 FAMILY HISTORY OF MELANOMA: ICD-10-CM

## 2025-04-23 DIAGNOSIS — D18.01 CHERRY ANGIOMA: ICD-10-CM

## 2025-04-23 ASSESSMENT — PAIN SCALES - GENERAL: PAINLEVEL_OUTOF10: NO PAIN (0)

## 2025-04-23 NOTE — LETTER
4/23/2025       RE: Arely Elena  [address]  [place] [address]     Dear Colleague,    Thank you for referring your patient, Arely Elena, to the [facility]MAT[facility] [place] at [facility]. Please see a copy of my visit note below.    [facility] Dermatology Note  Encounter Date: Apr 23, 2025  Office Visit     Dermatology Problem List:  1. History of Dysplastic Nevus  - L upper abdomen, s/p shave bx 04/24/2024  2. Dyshidrotic Eczema  - current tx: Tacrolimus 0.1% ointment, Triamcinolone 0.1% ointment    ____________________________________________    Assessment & Plan:    # Dyshidrotic Eczema, hands.  Chronic, not well controlled.   - Moisturize: Recommend good OTC moisturizer to full body, such as Cera-Ve, Addis-cream, or Cetaphil. Advised best to apply after bathing to lock in moisture.  Recommend using white cotton gloves or socks on the hands and bedtime.   - Use Tacrolimus 0.1% ointment bid for maintenance for high risk areas.   - Use Triamcinolone 0.1% ointment bid when sites are active and flaring.     # Skin cancer screening with multiple benign nevi, solar lentigines,   - ABCDEs: Counseled ABCDEs of melanoma: Asymmetry, Border (irregularity), Color (not uniform, changes in color), Diameter (greater than 6 mm which is about the size of a pencil eraser), and Evolving (any changes in preexisting moles).  - Sun protection: Counseled SPF30+ sunscreen, UPF clothing, sun avoidance, tanning bed avoidance.    # Cherry angiomas and seborrheic keratoses, dermatofibroma  Benign, reassurance given.     # Family hx of melanoma in 2nd degree relative (maternal grandfather )and family hx of NMSC  -Recommend annual screenings.     # Hx of DN.  - No signs of recurrence  - Continue regular skin examinations  - Sun precaution was advised including the use of sun screens of SPF 30 or higher, sun protective clothing, and  avoidance of tanning beds.      Procedures Performed:   None.    Follow-up: 1 year(s) in-person, or earlier for new or changing lesions    Staff and Scribe:   Scribe Disclosure:   By signing my name below, I, Ioana Kingston, attest that this documentation has been prepared under the direction and in the presence of Emily Echavarria PA-C.  - Electronically Signed: Ioana Kingston 04/24/24       Provider Disclosure:  I agree with above History, Review of Systems, Physical exam and Plan.  I have reviewed the content of the documentation and have edited it as needed. I have personally performed the services documented here and the documentation accurately represents those services and the decisions I have made.      Electronically signed by:  All risk, benefits and alternatives were discussed with patient.  Patient is in agreement and understands the assessment and plan.  All questions were answered.  Sun Screen Education was given.   Return to Clinic annually or sooner as needed.   Emily Echavarria PA-C        ____________________________________________    CC: Skin Check (Arely is here today for a skin check; She reports no spots of concern.)    HPI:  Ms. Arely Elena is a(n) 40 year old female who presents today as a return patient for [facility].     Patient reports her biopsy site healed well. She reports her hands are stable, however she notes one specific finger will occasionally become more bothersome. She reports her hands will occasionally have splitting uncomfortable skin but denies any infections. She stopped using the topicals as she doesn't want to get them on her children.   Patient is otherwise feeling well, without additional skin concerns.    Labs Reviewed:  Dermatopathology report from 04/24/2024  reviewed.  Final Diagnosis   Left upper abdomen:  - Compound dysplastic nevus with mild atypia - (see description)        Physical exam:  Vitals: There were no vitals taken for this visit.  GEN: This is a well  developed, well-nourished female in no acute distress, in a pleasant mood.    SKIN: Full skin, which includes the head/face, both arms, chest, back, abdomen,both legs, genitalia and/or groin buttocks, digits and/or nails, was examined.  - There is a firm tan/flesh colored papule that dimples with lateral pressure on the R lateral knee (7 mm).  - faint linear scar on L upper abdomen  - hands xerotic with scaling and superficial fissuring on several fingers  - There are dome shaped bright red papules on the head/neck, trunk, extremities.   - Multiple regular brown pigmented macules and papules are identified on the head/neck, trunk, extremities.   - Scattered brown macules on sun exposed areas.  - There are waxy stuck on tan to brown papules on the head/neck, trunk, extremities.  - No other lesions of concern on areas examined.       Medications:  Current Outpatient Medications   Medication Sig Dispense Refill     albuterol (PROAIR HFA/PROVENTIL HFA/VENTOLIN HFA) 108 (90 Base) MCG/ACT inhaler Inhale 2 puffs into the lungs every 6 hours as needed for shortness of breath / dyspnea or wheezing 18 g 3     levonorgestrel (MIRENA) 20 MCG/DAY IUD 1 each (20 mcg) by Intrauterine route once       tacrolimus (PROTOPIC) 0.1 % external ointment Apply topically 2 times daily To areas on fingers that are at risk for eczema flares, to prevent recurrence. (Patient not taking: Reported on 4/23/2025) 60 g 2     triamcinolone (KENALOG) 0.1 % external ointment Apply topically 2 times daily During flares to the fingers until clear. (Patient not taking: Reported on 4/23/2025) 60 g 1     No current facility-administered medications for this visit.      Past Medical History:   Patient Active Problem List   Diagnosis     Asthma, mild intermittent     Pelvic floor dysfunction in female     Lack of coordination     Family history of malignant neoplasm of ovary     Allergic rhinitis     Need for Tdap vaccination     Family history of colon  cancer     Past Medical History:   Diagnosis Date     Asthma      Varicella         CC Referred Self, MD  No address on file on close of this encounter.      Again, thank you for allowing me to participate in the care of your patient.      Sincerely,    Emily Echavarria PA-C

## 2025-04-23 NOTE — NURSING NOTE
Dermatology Rooming Note    Arely Elena's goals for this visit include:   Chief Complaint   Patient presents with    Skin Check     Arely is here today for a skin check; She reports no spots of concern.     Genaro Hernandez, Visit Facilitator

## 2025-04-23 NOTE — PATIENT INSTRUCTIONS

## 2025-04-23 NOTE — PROGRESS NOTES
[facility] Dermatology Note  Encounter Date: Apr 23, 2025  Office Visit     Dermatology Problem List:  1. History of Dysplastic Nevus  - L upper abdomen, s/p shave bx 04/24/2024  2. Dyshidrotic Eczema  - current tx: Tacrolimus 0.1% ointment, Triamcinolone 0.1% ointment    ____________________________________________    Assessment & Plan:    # Dyshidrotic Eczema, hands.  Chronic, not well controlled.   - Moisturize: Recommend good OTC moisturizer to full body, such as Cera-Ve, Addis-cream, or Cetaphil. Advised best to apply after bathing to lock in moisture.  Recommend using white cotton gloves or socks on the hands and bedtime.   - Use Tacrolimus 0.1% ointment bid for maintenance for high risk areas.   - Use Triamcinolone 0.1% ointment bid when sites are active and flaring.     # Skin cancer screening with multiple benign nevi, solar lentigines,   - ABCDEs: Counseled ABCDEs of melanoma: Asymmetry, Border (irregularity), Color (not uniform, changes in color), Diameter (greater than 6 mm which is about the size of a pencil eraser), and Evolving (any changes in preexisting moles).  - Sun protection: Counseled SPF30+ sunscreen, UPF clothing, sun avoidance, tanning bed avoidance.    # Cherry angiomas and seborrheic keratoses, dermatofibroma  Benign, reassurance given.     # Family hx of melanoma in 2nd degree relative (maternal grandfather )and family hx of NMSC  -Recommend annual screenings.     # Hx of DN.  - No signs of recurrence  - Continue regular skin examinations  - Sun precaution was advised including the use of sun screens of SPF 30 or higher, sun protective clothing, and avoidance of tanning beds.      Procedures Performed:   None.    Follow-up: 1 year(s) in-person, or earlier for new or changing lesions    Staff and Scribe:   Scribe Disclosure:   By signing my name below, I, Ioana Kingston, attest that this documentation has been prepared under the direction and in the presence of Emily Medrano  LATONIA Echavarria.  - Electronically Signed: Ioana Kingston 04/24/24       Provider Disclosure:  I agree with above History, Review of Systems, Physical exam and Plan.  I have reviewed the content of the documentation and have edited it as needed. I have personally performed the services documented here and the documentation accurately represents those services and the decisions I have made.      Electronically signed by:  All risk, benefits and alternatives were discussed with patient.  Patient is in agreement and understands the assessment and plan.  All questions were answered.  Sun Screen Education was given.   Return to Clinic annually or sooner as needed.   Emily Echavarria PA-C        ____________________________________________    CC: Skin Check (Arely is here today for a skin check; She reports no spots of concern.)    HPI:  Ms. Arely Elena is a(n) 40 year old female who presents today as a return patient for [facility].     Patient reports her biopsy site healed well. She reports her hands are stable, however she notes one specific finger will occasionally become more bothersome. She reports her hands will occasionally have splitting uncomfortable skin but denies any infections. She stopped using the topicals as she doesn't want to get them on her children.   Patient is otherwise feeling well, without additional skin concerns.    Labs Reviewed:  Dermatopathology report from 04/24/2024  reviewed.  Final Diagnosis   Left upper abdomen:  - Compound dysplastic nevus with mild atypia - (see description)        Physical exam:  Vitals: There were no vitals taken for this visit.  GEN: This is a well developed, well-nourished female in no acute distress, in a pleasant mood.    SKIN: Full skin, which includes the head/face, both arms, chest, back, abdomen,both legs, genitalia and/or groin buttocks, digits and/or nails, was examined.  - There is a firm tan/flesh colored papule that dimples with lateral pressure on the R  lateral knee (7 mm).  - faint linear scar on L upper abdomen  - hands xerotic with scaling and superficial fissuring on several fingers  - There are dome shaped bright red papules on the head/neck, trunk, extremities.   - Multiple regular brown pigmented macules and papules are identified on the head/neck, trunk, extremities.   - Scattered brown macules on sun exposed areas.  - There are waxy stuck on tan to brown papules on the head/neck, trunk, extremities.  - No other lesions of concern on areas examined.       Medications:  Current Outpatient Medications   Medication Sig Dispense Refill    albuterol (PROAIR HFA/PROVENTIL HFA/VENTOLIN HFA) 108 (90 Base) MCG/ACT inhaler Inhale 2 puffs into the lungs every 6 hours as needed for shortness of breath / dyspnea or wheezing 18 g 3    levonorgestrel (MIRENA) 20 MCG/DAY IUD 1 each (20 mcg) by Intrauterine route once      tacrolimus (PROTOPIC) 0.1 % external ointment Apply topically 2 times daily To areas on fingers that are at risk for eczema flares, to prevent recurrence. (Patient not taking: Reported on 4/23/2025) 60 g 2    triamcinolone (KENALOG) 0.1 % external ointment Apply topically 2 times daily During flares to the fingers until clear. (Patient not taking: Reported on 4/23/2025) 60 g 1     No current facility-administered medications for this visit.      Past Medical History:   Patient Active Problem List   Diagnosis    Asthma, mild intermittent    Pelvic floor dysfunction in female    Lack of coordination    Family history of malignant neoplasm of ovary    Allergic rhinitis    Need for Tdap vaccination    Family history of colon cancer     Past Medical History:   Diagnosis Date    Asthma     Varicella         CC Referred Self, MD  No address on file on close of this encounter.

## 2025-06-09 ENCOUNTER — TRANSFERRED RECORDS (OUTPATIENT)
Dept: HEALTH INFORMATION MANAGEMENT | Facility: CLINIC | Age: 40
End: 2025-06-09
Payer: COMMERCIAL

## 2025-06-25 ENCOUNTER — OFFICE VISIT (OUTPATIENT)
Dept: OBGYN | Facility: CLINIC | Age: 40
End: 2025-06-25
Payer: COMMERCIAL

## 2025-06-25 VITALS
WEIGHT: 130.2 LBS | OXYGEN SATURATION: 97 % | DIASTOLIC BLOOD PRESSURE: 76 MMHG | BODY MASS INDEX: 20.44 KG/M2 | HEIGHT: 67 IN | SYSTOLIC BLOOD PRESSURE: 124 MMHG | HEART RATE: 68 BPM | TEMPERATURE: 97.4 F

## 2025-06-25 DIAGNOSIS — Z12.4 PAP SMEAR FOR CERVICAL CANCER SCREENING: Primary | ICD-10-CM

## 2025-06-25 DIAGNOSIS — Z30.432 ENCOUNTER FOR REMOVAL OF INTRAUTERINE CONTRACEPTIVE DEVICE: ICD-10-CM

## 2025-06-25 DIAGNOSIS — T83.32XA INTRAUTERINE CONTRACEPTIVE DEVICE THREADS LOST, INITIAL ENCOUNTER: ICD-10-CM

## 2025-06-25 PROCEDURE — 87624 HPV HI-RISK TYP POOLED RSLT: CPT | Performed by: OBSTETRICS & GYNECOLOGY

## 2025-06-25 PROCEDURE — 3074F SYST BP LT 130 MM HG: CPT | Performed by: OBSTETRICS & GYNECOLOGY

## 2025-06-25 PROCEDURE — 99213 OFFICE O/P EST LOW 20 MIN: CPT | Mod: 25 | Performed by: OBSTETRICS & GYNECOLOGY

## 2025-06-25 PROCEDURE — 58301 REMOVE INTRAUTERINE DEVICE: CPT | Mod: 53 | Performed by: OBSTETRICS & GYNECOLOGY

## 2025-06-25 PROCEDURE — 3078F DIAST BP <80 MM HG: CPT | Performed by: OBSTETRICS & GYNECOLOGY

## 2025-06-25 NOTE — PROGRESS NOTES
IUD Removal:  SUBJECTIVE:    Is a pregnancy test required: No.  Was a consent obtained?  Yes    Arely Elena is a 40 year old female,, No LMP recorded (lmp unknown). (Menstrual status: IUD). who presents today for IUD removal. Her current IUD was placed 3yrs ago. She has not had problems with the IUD. She requests removal of the IUD because  had vasectomy    Today's PHQ-2 Score:       2025     3:36 PM   PHQ-2 (  [employer])   Q1: Little interest or pleasure in doing things 0   Q2: Feeling down, depressed or hopeless 0   PHQ-2 Score 0    Q1: Little interest or pleasure in doing things Not at all   Q2: Feeling down, depressed or hopeless Not at all   PHQ-2 Score 0       Patient-reported         PROCEDURE:    A speculum exam was performed and the cervix was visualized. A pap smear was done. The IUD string was not visualized. Attempt was made to sweep strings out with cyto brush and then alligator clamp, but she preferred to stop and get pelvic us to locate IUD first.      POST PROCEDURE:    The patient tolerated the procedure well. Patient was discharged in stable condition.    Will schedule pelvic us and US guided IUD removal with paracervical block.  She is aware if unable to remove in office, would need hysteroscopy.    Codi Case MD

## 2025-06-26 LAB
HPV HR 12 DNA CVX QL NAA+PROBE: NEGATIVE
HPV16 DNA CVX QL NAA+PROBE: NEGATIVE
HPV18 DNA CVX QL NAA+PROBE: NEGATIVE
HUMAN PAPILLOMA VIRUS FINAL DIAGNOSIS: NORMAL

## 2025-06-27 ENCOUNTER — RESULTS FOLLOW-UP (OUTPATIENT)
Dept: OBGYN | Facility: CLINIC | Age: 40
End: 2025-06-27

## 2025-06-30 ENCOUNTER — OFFICE VISIT (OUTPATIENT)
Dept: FAMILY MEDICINE | Facility: CLINIC | Age: 40
End: 2025-06-30
Payer: COMMERCIAL

## 2025-06-30 VITALS
WEIGHT: 127 LBS | HEIGHT: 66 IN | TEMPERATURE: 98.1 F | OXYGEN SATURATION: 96 % | BODY MASS INDEX: 20.41 KG/M2 | DIASTOLIC BLOOD PRESSURE: 69 MMHG | SYSTOLIC BLOOD PRESSURE: 106 MMHG | RESPIRATION RATE: 16 BRPM | HEART RATE: 69 BPM

## 2025-06-30 DIAGNOSIS — Z12.11 ENCOUNTER FOR SCREENING FOR MALIGNANT NEOPLASM OF COLON: ICD-10-CM

## 2025-06-30 DIAGNOSIS — Z00.00 ROUTINE GENERAL MEDICAL EXAMINATION AT A HEALTH CARE FACILITY: Primary | ICD-10-CM

## 2025-06-30 LAB
BKR AP ASSOCIATED HPV REPORT: NORMAL
BKR LAB AP GYN ADEQUACY: NORMAL
BKR LAB AP GYN INTERPRETATION: NORMAL
BKR LAB AP PREVIOUS ABNORMAL: NORMAL
PATH REPORT.COMMENTS IMP SPEC: NORMAL
PATH REPORT.COMMENTS IMP SPEC: NORMAL
PATH REPORT.RELEVANT HX SPEC: NORMAL

## 2025-06-30 PROCEDURE — 90677 PCV20 VACCINE IM: CPT | Performed by: FAMILY MEDICINE

## 2025-06-30 PROCEDURE — 1126F AMNT PAIN NOTED NONE PRSNT: CPT | Performed by: FAMILY MEDICINE

## 2025-06-30 PROCEDURE — 3074F SYST BP LT 130 MM HG: CPT | Performed by: FAMILY MEDICINE

## 2025-06-30 PROCEDURE — 3078F DIAST BP <80 MM HG: CPT | Performed by: FAMILY MEDICINE

## 2025-06-30 PROCEDURE — 90471 IMMUNIZATION ADMIN: CPT | Performed by: FAMILY MEDICINE

## 2025-06-30 PROCEDURE — 99395 PREV VISIT EST AGE 18-39: CPT | Mod: 25 | Performed by: FAMILY MEDICINE

## 2025-06-30 SDOH — HEALTH STABILITY: PHYSICAL HEALTH: ON AVERAGE, HOW MANY DAYS PER WEEK DO YOU ENGAGE IN MODERATE TO STRENUOUS EXERCISE (LIKE A BRISK WALK)?: 5 DAYS

## 2025-06-30 ASSESSMENT — SOCIAL DETERMINANTS OF HEALTH (SDOH): HOW OFTEN DO YOU GET TOGETHER WITH FRIENDS OR RELATIVES?: ONCE A WEEK

## 2025-06-30 ASSESSMENT — PAIN SCALES - GENERAL: PAINLEVEL_OUTOF10: NO PAIN (0)

## 2025-06-30 NOTE — PROGRESS NOTES
Preventive Care Visit  [facility]  Caitlin Medina MD, Family Medicine  Jun 30, 2025      Assessment & Plan     Arely was seen today for physical.    Diagnoses and all orders for this visit:    Routine general medical examination at a health care facility    Encounter for screening for malignant neoplasm of colon  -     Adult GI  Referral - Procedure Only; Future    Other orders  -     REVIEW OF HEALTH MAINTENANCE PROTOCOL ORDERS  -     Pneumococcal 20 Valent Conjugate (Prevnar 20)  -     PRIMARY CARE FOLLOW-UP SCHEDULING; Future      Health Maintenance Reviewed:  Reviewed: Appropriate tests and/or vaccines ordered today.          Reviewed preventive health counseling, as reflected in patient instructions  Counseling  Appropriate preventive services were addressed with this patient via screening, questionnaire, or discussion as appropriate for fall prevention, nutrition, physical activity, Tobacco-use cessation, social engagement, weight loss and cognition.  Checklist reviewing preventive services available has been given to the patient.  Reviewed patient's diet, addressing concerns and/or questions.   She is at risk for psychosocial distress and has been provided with information to reduce risk.         Follow-up    Follow-up Visit   Expected date:  Jun 30, 2026 (Approximate)      Follow Up Appointment Details:     Follow-up with whom?: PCP    Follow-Up for what?: Adult Preventive    How?: In Person                 Subjective   Arely is a 40 year old, presenting for the following:  Physical        6/30/2025     2:28 PM   Additional Questions   Roomed by Natalie DAS   Accompanied by n/a          HPI  Advance Care Planning    Discussed advance care planning with patient; informed AVS has link to [employer].        6/30/2025   General Health   How would you rate your overall physical health? Good   Feel stress (tense, anxious, or unable to sleep) To some extent   (!) STRESS CONCERN       6/30/2025   Nutrition   Three or more servings of calcium each day? (!) NO   Diet: Regular (no restrictions)   How many servings of fruit and vegetables per day? 4 or more   How many sweetened beverages each day? 0-1         6/30/2025   Exercise   Days per week of moderate/strenous exercise 5 days         6/30/2025   Social Factors   Frequency of gathering with friends or relatives Once a week   Worry food won't last until get money to buy more No   Food not last or not have enough money for food? No   Do you have housing? (Housing is defined as stable permanent housing and does not include staying outside in a car, in a tent, in an abandoned building, in an overnight shelter, or couch-surfing.) Yes   Are you worried about losing your housing? No   Lack of transportation? No   Unable to get utilities (heat,electricity)? No         6/30/2025   Dental   Dentist two times every year? Yes           6/30/2025   Substance Use   Alcohol more than 3/day or more than 7/wk No   Do you use any other substances recreationally? No     Social History     Tobacco Use    Smoking status: Never    Smokeless tobacco: Never   Vaping Use    Vaping status: Never Used   Substance Use Topics    Alcohol use: Not Currently    Drug use: Never           11/13/2023   LAST FHS-7 RESULTS   1st degree relative breast or ovarian cancer Yes   Any relative bilateral breast cancer No   Any male have breast cancer No   Any ONE woman have BOTH breast AND ovarian cancer No   Any woman with breast cancer before 50yrs No   2 or more relatives with breast AND/OR ovarian cancer Yes   2 or more relatives with breast AND/OR bowel cancer No    Maternal grandmother - maybe breast cancer, unsure.           6/30/2025   STI Screening   New sexual partner(s) since last STI/HIV test? No     History of abnormal Pap smear: No - age 30-64 HPV with reflex Pap every 5 years recommended        Latest Ref Rng & Units 6/25/2025     3:52 PM 9/24/2019     9:07 AM 9/24/2019     " 8:00 AM   PAP / HPV   PAP  Negative for Intraepithelial Lesion or Malignancy (NILM)      PAP (Historical)   NIL     HPV 16 DNA Negative Negative   Negative    HPV 18 DNA Negative Negative   Negative    Other HR HPV Negative Negative   Negative            6/30/2025   Contraception/Family Planning   Questions about contraception or family planning No   What are your periods like? (!) SPOTTING        Reviewed and updated as needed this visit by Provider   Tobacco  Allergies  Meds  Problems  Med Hx  Surg Hx  Fam Hx                Review of Systems  Constitutional, neuro, ENT, endocrine, pulmonary, cardiac, gastrointestinal, genitourinary, musculoskeletal, integument and psychiatric systems are negative, except as otherwise noted.     Objective    Exam  /69   Pulse 69   Temp 98.1  F (36.7  C) (Temporal)   Resp 16   Ht 1.68 m (5' 6.14\")   Wt 57.6 kg (127 lb)   LMP  (LMP Unknown)   SpO2 96%   BMI 20.41 kg/m     Estimated body mass index is 20.41 kg/m  as calculated from the following:    Height as of this encounter: 1.68 m (5' 6.14\").    Weight as of this encounter: 57.6 kg (127 lb).    Physical Exam  GENERAL: alert and no distress  EYES: Eyes grossly normal to inspection, PERRL and conjunctivae and sclerae normal  HENT: ear canals and TM's normal, nose and mouth without ulcers or lesions  NECK: no adenopathy, no asymmetry, masses, or scars  RESP: lungs clear to auscultation - no rales, rhonchi or wheezes  CV: regular rate and rhythm, normal S1 S2, no S3 or S4, no murmur, click or rub, no peripheral edema  ABDOMEN: soft, nontender, no hepatosplenomegaly, no masses and bowel sounds normal  MS: no gross musculoskeletal defects noted, no edema  SKIN: no suspicious lesions or rashes  NEURO: Normal strength and tone, mentation intact and speech normal  PSYCH: mentation appears normal, affect normal/bright        Signed Electronically by: Caitlin Medina MD    "

## 2025-06-30 NOTE — PATIENT INSTRUCTIONS
Patient Education   Preventive Care Advice   This is general advice given by our system to help you stay healthy. However, your care team may have specific advice just for you. Please talk to your care team about your preventive care needs.  Nutrition  Eat 5 or more servings of fruits and vegetables each day.  Try wheat bread, brown rice and whole grain pasta (instead of white bread, rice, and pasta).  Get enough calcium and vitamin D. Check the label on foods and aim for 100% of the RDA (recommended daily allowance).  Lifestyle  Exercise at least 150 minutes each week  (30 minutes a day, 5 days a week).  Do muscle strengthening activities 2 days a week. These help control your weight and prevent disease.  No smoking.  Wear sunscreen to prevent skin cancer.  Have a dental exam and cleaning every 6 months.  Yearly exams  See your health care team every year to talk about:  Any changes in your health.  Any medicines your care team has prescribed.  Preventive care, family planning, and ways to prevent chronic diseases.  Shots (vaccines)   HPV shots (up to age 26), if you've never had them before.  Hepatitis B shots (up to age 59), if you've never had them before.  COVID-19 shot: Get this shot when it's due.  Flu shot: Get a flu shot every year.  Tetanus shot: Get a tetanus shot every 10 years.  Pneumococcal, hepatitis A, and RSV shots: Ask your care team if you need these based on your risk.  Shingles shot (for age 50 and up)  General health tests  Diabetes screening:  Starting at age 35, Get screened for diabetes at least every 3 years.  If you are younger than age 35, ask your care team if you should be screened for diabetes.  Cholesterol test: At age 39, start having a cholesterol test every 5 years, or more often if advised.  Bone density scan (DEXA): At age 50, ask your care team if you should have this scan for osteoporosis (brittle bones).  Hepatitis C: Get tested at least once in your life.  STIs (sexually  transmitted infections)  Before age 24: Ask your care team if you should be screened for STIs.  After age 24: Get screened for STIs if you're at risk. You are at risk for STIs (including HIV) if:  You are sexually active with more than one person.  You don't use condoms every time.  You or a partner was diagnosed with a sexually transmitted infection.  If you are at risk for HIV, ask about PrEP medicine to prevent HIV.  Get tested for HIV at least once in your life, whether you are at risk for HIV or not.  Cancer screening tests  Cervical cancer screening: If you have a cervix, begin getting regular cervical cancer screening tests starting at age 21.  Breast cancer scan (mammogram): If you've ever had breasts, begin having regular mammograms starting at age 40. This is a scan to check for breast cancer.  Colon cancer screening: It is important to start screening for colon cancer at age 45.  Have a colonoscopy test every 10 years (or more often if you're at risk) Or, ask your provider about stool tests like a FIT test every year or Cologuard test every 3 years.  To learn more about your testing options, visit:   .  For help making a decision, visit:   https://bit.ly/mv94408.  Prostate cancer screening test: If you have a prostate, ask your care team if a prostate cancer screening test (PSA) at age 55 is right for you.  Lung cancer screening: If you are a current or former smoker ages 50 to 80, ask your care team if ongoing lung cancer screenings are right for you.  For informational purposes only. Not to replace the advice of your health care provider. Copyright   2023 [facility]. All rights reserved. Clinically reviewed by the [facility]. [employer] 312371 - REV 01/24.  Learning About Stress  What is stress?     Stress is your body's response to a hard situation. Your body can have a physical, emotional, or mental response. Stress is a fact of life for most people, and it  affects everyone differently. What causes stress for you may not be stressful for someone else.  A lot of things can cause stress. You may feel stress when you go on a job interview, take a test, or run a race. This kind of short-term stress is normal and even useful. It can help you if you need to work hard or react quickly. For example, stress can help you finish an important job on time.  Long-term stress is caused by ongoing stressful situations or events. Examples of long-term stress include long-term health problems, ongoing problems at work, or conflicts in your family. Long-term stress can harm your health.  How does stress affect your health?  When you are stressed, your body responds as though you are in danger. It makes hormones that speed up your heart, make you breathe faster, and give you a burst of energy. This is called the fight-or-flight stress response. If the stress is over quickly, your body goes back to normal and no harm is done.  But if stress happens too often or lasts too long, it can have bad effects. Long-term stress can make you more likely to get sick, and it can make symptoms of some diseases worse. If you tense up when you are stressed, you may develop neck, shoulder, or low back pain. Stress is linked to high blood pressure and heart disease.  Stress also harms your emotional health. It can make you rainey, tense, or depressed. Your relationships may suffer, and you may not do well at work or school.  What can you do to manage stress?  You can try these things to help manage stress:   Do something active. Exercise or activity can help reduce stress. Walking is a great way to get started. Even everyday activities such as housecleaning or yard work can help.  Try yoga or gwendolyn chi. These techniques combine exercise and meditation. You may need some training at first to learn them.  Do something you enjoy. For example, listen to music or go to a movie. Practice your hobby or do volunteer  "work.  Meditate. This can help you relax, because you are not worrying about what happened before or what may happen in the future.  Do guided imagery. Imagine yourself in any setting that helps you feel calm. You can use online videos, books, or a teacher to guide you.  Do breathing exercises. For example:  From a standing position, bend forward from the waist with your knees slightly bent. Let your arms dangle close to the floor.  Breathe in slowly and deeply as you return to a standing position. Roll up slowly and lift your head last.  Hold your breath for just a few seconds in the standing position.  Breathe out slowly and bend forward from the waist.  Let your feelings out. Talk, laugh, cry, and express anger when you need to. Talking with supportive friends or family, a counselor, or a jane leader about your feelings is a healthy way to relieve stress. Avoid discussing your feelings with people who make you feel worse.  Write. It may help to write about things that are bothering you. This helps you find out how much stress you feel and what is causing it. When you know this, you can find better ways to cope.  What can you do to prevent stress?  You might try some of these things to help prevent stress:  Manage your time. This helps you find time to do the things you want and need to do.  Get enough sleep. Your body recovers from the stresses of the day while you are sleeping.  Get support. Your family, friends, and community can make a difference in how you experience stress.  Limit your news feed. Avoid or limit time on social media or news that may make you feel stressed.  Do something active. Exercise or activity can help reduce stress. Walking is a great way to get started.  Where can you learn more?  Go to https://www.Poll[employer].net/patiented  Enter N032 in the search box to learn more about \"Learning About Stress.\"  Current as of: October 24, 2024  Content Version: 14.5 2024-2025 [employer].   Care instructions adapted under license by your healthcare professional. If you have questions about a medical condition or this instruction, always ask your healthcare professional. [employer] disclaims any warranty or liability for your use of this information.

## 2025-07-29 ENCOUNTER — MYC MEDICAL ADVICE (OUTPATIENT)
Dept: OBGYN | Facility: CLINIC | Age: 40
End: 2025-07-29
Payer: COMMERCIAL